# Patient Record
Sex: MALE | Race: WHITE | NOT HISPANIC OR LATINO | Employment: OTHER | URBAN - METROPOLITAN AREA
[De-identification: names, ages, dates, MRNs, and addresses within clinical notes are randomized per-mention and may not be internally consistent; named-entity substitution may affect disease eponyms.]

---

## 2017-01-13 RX ORDER — LISINOPRIL 10 MG/1
TABLET ORAL
Qty: 90 TABLET | Refills: 0 | Status: SHIPPED | OUTPATIENT
Start: 2017-01-13 | End: 2018-03-12

## 2017-02-24 ENCOUNTER — OFFICE VISIT (OUTPATIENT)
Dept: FAMILY MEDICINE CLINIC | Facility: CLINIC | Age: 62
End: 2017-02-24

## 2017-02-24 VITALS
TEMPERATURE: 98.7 F | DIASTOLIC BLOOD PRESSURE: 78 MMHG | HEIGHT: 72 IN | OXYGEN SATURATION: 96 % | WEIGHT: 229 LBS | BODY MASS INDEX: 31.02 KG/M2 | HEART RATE: 91 BPM | SYSTOLIC BLOOD PRESSURE: 134 MMHG

## 2017-02-24 DIAGNOSIS — R07.2 PRECORDIAL PAIN: Primary | ICD-10-CM

## 2017-02-24 DIAGNOSIS — R07.89 CHEST PAIN, ATYPICAL: ICD-10-CM

## 2017-02-24 PROCEDURE — 99213 OFFICE O/P EST LOW 20 MIN: CPT | Performed by: INTERNAL MEDICINE

## 2017-02-24 PROCEDURE — 93000 ELECTROCARDIOGRAM COMPLETE: CPT | Performed by: INTERNAL MEDICINE

## 2017-02-24 NOTE — PROGRESS NOTES
Subjective   Terrance Gresham is a 61 y.o. male. Patient is here today for   Chief Complaint   Patient presents with   • Arm Pain     Right arm pain    • Medication Problem     Discuss androgel           Vitals:    02/24/17 1312   BP: 134/78   Pulse: 91   Temp: 98.7 °F (37.1 °C)   SpO2: 96%       Past Medical History   Diagnosis Date   • Hyperlipidemia    • Hypertension       No Known Allergies   Social History     Social History   • Marital status:      Spouse name: N/A   • Number of children: N/A   • Years of education: N/A     Occupational History   • Not on file.     Social History Main Topics   • Smoking status: Never Smoker   • Smokeless tobacco: Not on file   • Alcohol use Yes   • Drug use: Not on file   • Sexual activity: Not on file     Other Topics Concern   • Not on file     Social History Narrative   • No narrative on file        Current Outpatient Prescriptions:   •  aspirin 81 MG tablet, Take  by mouth., Disp: , Rfl:   •  Cholecalciferol (VITAMIN D3) 2000 UNITS capsule, Take  by mouth., Disp: , Rfl:   •  lansoprazole (PREVACID) 30 MG capsule, TAKE 1 CAPSULE BY MOUTH DAILY, Disp: 90 capsule, Rfl: 0  •  lisinopril (PRINIVIL,ZESTRIL) 10 MG tablet, TAKE 1 TABLET BY MOUTH DAILY, Disp: 90 tablet, Rfl: 0  •  Omega-3 Fatty Acids (FISH OIL) 1200 MG capsule capsule, Take  by mouth., Disp: , Rfl:   •  simvastatin (ZOCOR) 40 MG tablet, TAKE 1 TABLET BY MOUTH EVERY NIGHT AT BEDTIME, Disp: 90 tablet, Rfl: 3  •  Testosterone (ANDROGEL) 40.5 MG/2.5GM (1.62%) gel, APPLY 1 PACKET TO SKIN DAILY AS DIRECTED, Disp: 225 g, Rfl: 1  •  tadalafil (CIALIS) 20 MG tablet, Take 20 mg by mouth Daily As Needed for erectile dysfunction., Disp: , Rfl:      Objective     HPI Comments: He has been having some precordial chest pain.  He tells me that it generally happens to him while he did rest.  He might feel a little nauseated when it occurs, it lasts generally only for a few seconds at a time.  There is no radiation of this  pain into the neck or the arms, there is no associated dyspnea.  There doesn't seem to be in association with physical activity.  It doesn't wake him up at night.    Arm Pain    Associated symptoms include chest pain.        Review of Systems   Constitutional: Negative.    HENT: Negative.    Respiratory: Negative.    Cardiovascular: Positive for chest pain.   Gastrointestinal: Negative.    Musculoskeletal: Negative.    Psychiatric/Behavioral: Negative.        Physical Exam   Constitutional: He is oriented to person, place, and time. He appears well-developed and well-nourished.   HENT:   Head: Normocephalic and atraumatic.   Cardiovascular: Normal rate, regular rhythm and normal heart sounds.  Exam reveals no gallop and no friction rub.    Pulmonary/Chest: Effort normal and breath sounds normal.   Neurological: He is alert and oriented to person, place, and time.   Psychiatric: He has a normal mood and affect. His behavior is normal.         Problem List Items Addressed This Visit        Nervous and Auditory    Chest pain, atypical      Other Visit Diagnoses     Precordial pain    -  Primary    Relevant Orders    Ambulatory Referral to Cardiology    ECG 12 Lead (Completed)            PLAN  His electrocardiogram showed normal sinus rhythm.  There appeared to be no changes when compared to electrocardiogram from November.  He had a left axis deviation.  There may be old evidence for an inferior infarct.        I asked him to stop taking testosterone.  I asked him to make sure he is taking daily aspirin.  I asked him to follow-up with cardiology.  I referred him to Dr. Keegan Reid.      No Follow-up on file.

## 2017-03-01 ENCOUNTER — OFFICE VISIT (OUTPATIENT)
Dept: CARDIOLOGY | Facility: CLINIC | Age: 62
End: 2017-03-01

## 2017-03-01 VITALS
DIASTOLIC BLOOD PRESSURE: 83 MMHG | WEIGHT: 230 LBS | SYSTOLIC BLOOD PRESSURE: 133 MMHG | BODY MASS INDEX: 31.19 KG/M2 | HEART RATE: 77 BPM

## 2017-03-01 DIAGNOSIS — R09.89 CAROTID BRUIT, UNSPECIFIED LATERALITY: ICD-10-CM

## 2017-03-01 DIAGNOSIS — E78.00 HYPERCHOLESTEROLEMIA: ICD-10-CM

## 2017-03-01 DIAGNOSIS — I10 ESSENTIAL HYPERTENSION: Primary | ICD-10-CM

## 2017-03-01 DIAGNOSIS — E66.9 ADIPOSITY: ICD-10-CM

## 2017-03-01 DIAGNOSIS — R07.89 CHEST PAIN, ATYPICAL: ICD-10-CM

## 2017-03-01 DIAGNOSIS — M54.6 ACUTE BILATERAL THORACIC BACK PAIN: ICD-10-CM

## 2017-03-01 DIAGNOSIS — K21.9 GASTROESOPHAGEAL REFLUX DISEASE, ESOPHAGITIS PRESENCE NOT SPECIFIED: ICD-10-CM

## 2017-03-01 PROCEDURE — 99204 OFFICE O/P NEW MOD 45 MIN: CPT | Performed by: INTERNAL MEDICINE

## 2017-03-01 PROCEDURE — 93000 ELECTROCARDIOGRAM COMPLETE: CPT | Performed by: INTERNAL MEDICINE

## 2017-03-01 RX ORDER — UBIDECARENONE 75 MG
500 CAPSULE ORAL DAILY
COMMUNITY
End: 2018-10-01
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
## 2017-03-02 ENCOUNTER — HOSPITAL ENCOUNTER (OUTPATIENT)
Dept: CARDIOLOGY | Facility: HOSPITAL | Age: 62
Discharge: HOME | End: 2017-03-02
Attending: INTERNAL MEDICINE | Admitting: INTERNAL MEDICINE

## 2017-03-02 VITALS
BODY MASS INDEX: 31.15 KG/M2 | SYSTOLIC BLOOD PRESSURE: 133 MMHG | DIASTOLIC BLOOD PRESSURE: 83 MMHG | HEIGHT: 72 IN | WEIGHT: 230 LBS

## 2017-03-02 PROCEDURE — 93306 TTE W/DOPPLER COMPLETE: CPT | Performed by: INTERNAL MEDICINE

## 2017-03-02 PROCEDURE — 0399T HC MYOCARDL STRAIN IMAG QUAN ASSMT PER SESS: CPT

## 2017-03-02 PROCEDURE — 93306 TTE W/DOPPLER COMPLETE: CPT

## 2017-03-03 LAB
BH CV ECHO MEAS - ACS: 1.9 CM
BH CV ECHO MEAS - AO MAX PG (FULL): 1.4 MMHG
BH CV ECHO MEAS - AO MAX PG: 8.1 MMHG
BH CV ECHO MEAS - AO MEAN PG (FULL): 1 MMHG
BH CV ECHO MEAS - AO MEAN PG: 5 MMHG
BH CV ECHO MEAS - AO ROOT AREA (BSA CORRECTED): 1.5
BH CV ECHO MEAS - AO ROOT AREA: 8.6 CM^2
BH CV ECHO MEAS - AO ROOT DIAM: 3.3 CM
BH CV ECHO MEAS - AO V2 MAX: 142 CM/SEC
BH CV ECHO MEAS - AO V2 MEAN: 103 CM/SEC
BH CV ECHO MEAS - AO V2 VTI: 27.1 CM
BH CV ECHO MEAS - AVA(I,A): 4.2 CM^2
BH CV ECHO MEAS - AVA(I,D): 4.2 CM^2
BH CV ECHO MEAS - AVA(V,A): 4.1 CM^2
BH CV ECHO MEAS - AVA(V,D): 4.1 CM^2
BH CV ECHO MEAS - BSA(HAYCOCK): 2.3 M^2
BH CV ECHO MEAS - BSA: 2.3 M^2
BH CV ECHO MEAS - BZI_BMI: 31.2 KILOGRAMS/M^2
BH CV ECHO MEAS - BZI_METRIC_HEIGHT: 182.9 CM
BH CV ECHO MEAS - BZI_METRIC_WEIGHT: 104.3 KG
BH CV ECHO MEAS - CONTRAST EF 4CH: 59.6 ML/M^2
BH CV ECHO MEAS - EDV(CUBED): 59.3 ML
BH CV ECHO MEAS - EDV(MOD-SP4): 94 ML
BH CV ECHO MEAS - EDV(TEICH): 65.9 ML
BH CV ECHO MEAS - EF(CUBED): 73.7 %
BH CV ECHO MEAS - EF(MOD-SP4): 59.6 %
BH CV ECHO MEAS - EF(TEICH): 66.1 %
BH CV ECHO MEAS - ESV(CUBED): 15.6 ML
BH CV ECHO MEAS - ESV(MOD-SP4): 38 ML
BH CV ECHO MEAS - ESV(TEICH): 22.3 ML
BH CV ECHO MEAS - FS: 35.9 %
BH CV ECHO MEAS - IVS/LVPW: 1
BH CV ECHO MEAS - IVSD: 1.1 CM
BH CV ECHO MEAS - LA DIMENSION: 4.6 CM
BH CV ECHO MEAS - LA/AO: 1.4
BH CV ECHO MEAS - LAT PEAK E' VEL: 8.8 CM/SEC
BH CV ECHO MEAS - LV DIASTOLIC VOL/BSA (35-75): 41.6 ML/M^2
BH CV ECHO MEAS - LV MASS(C)D: 140.1 GRAMS
BH CV ECHO MEAS - LV MASS(C)DI: 62 GRAMS/M^2
BH CV ECHO MEAS - LV MAX PG: 6.7 MMHG
BH CV ECHO MEAS - LV MEAN PG: 4 MMHG
BH CV ECHO MEAS - LV SYSTOLIC VOL/BSA (12-30): 16.8 ML/M^2
BH CV ECHO MEAS - LV V1 MAX: 129 CM/SEC
BH CV ECHO MEAS - LV V1 MEAN: 86.7 CM/SEC
BH CV ECHO MEAS - LV V1 VTI: 25.2 CM
BH CV ECHO MEAS - LVIDD: 3.9 CM
BH CV ECHO MEAS - LVIDS: 2.5 CM
BH CV ECHO MEAS - LVLD AP4: 8.6 CM
BH CV ECHO MEAS - LVLS AP4: 6.3 CM
BH CV ECHO MEAS - LVOT AREA (M): 4.5 CM^2
BH CV ECHO MEAS - LVOT AREA: 4.5 CM^2
BH CV ECHO MEAS - LVOT DIAM: 2.4 CM
BH CV ECHO MEAS - LVPWD: 1.1 CM
BH CV ECHO MEAS - MED PEAK E' VEL: 6.9 CM/SEC
BH CV ECHO MEAS - MV A DUR: 0.19 SEC
BH CV ECHO MEAS - MV A MAX VEL: 102 CM/SEC
BH CV ECHO MEAS - MV DEC SLOPE: 277 CM/SEC^2
BH CV ECHO MEAS - MV DEC TIME: 0.17 SEC
BH CV ECHO MEAS - MV E MAX VEL: 75.7 CM/SEC
BH CV ECHO MEAS - MV E/A: 0.74
BH CV ECHO MEAS - MV MAX PG: 3.9 MMHG
BH CV ECHO MEAS - MV MEAN PG: 2 MMHG
BH CV ECHO MEAS - MV P1/2T MAX VEL: 75.7 CM/SEC
BH CV ECHO MEAS - MV P1/2T: 80 MSEC
BH CV ECHO MEAS - MV V2 MAX: 98.3 CM/SEC
BH CV ECHO MEAS - MV V2 MEAN: 67.2 CM/SEC
BH CV ECHO MEAS - MV V2 VTI: 21 CM
BH CV ECHO MEAS - MVA P1/2T LCG: 2.9 CM^2
BH CV ECHO MEAS - MVA(P1/2T): 2.7 CM^2
BH CV ECHO MEAS - MVA(VTI): 5.4 CM^2
BH CV ECHO MEAS - PA MAX PG (FULL): 1.9 MMHG
BH CV ECHO MEAS - PA MAX PG: 3.1 MMHG
BH CV ECHO MEAS - PA V2 MAX: 88.5 CM/SEC
BH CV ECHO MEAS - PULM A REVS DUR: 0.12 SEC
BH CV ECHO MEAS - PULM A REVS VEL: 30.3 CM/SEC
BH CV ECHO MEAS - PULM DIAS VEL: 39 CM/SEC
BH CV ECHO MEAS - PULM S/D: 1.6
BH CV ECHO MEAS - PULM SYS VEL: 62 CM/SEC
BH CV ECHO MEAS - PVA(V,A): 2.8 CM^2
BH CV ECHO MEAS - PVA(V,D): 2.8 CM^2
BH CV ECHO MEAS - QP/QS: 0.44
BH CV ECHO MEAS - RAP SYSTOLE: 10 MMHG
BH CV ECHO MEAS - RV MAX PG: 1.2 MMHG
BH CV ECHO MEAS - RV MEAN PG: 1 MMHG
BH CV ECHO MEAS - RV V1 MAX: 54.7 CM/SEC
BH CV ECHO MEAS - RV V1 MEAN: 42.9 CM/SEC
BH CV ECHO MEAS - RV V1 VTI: 11 CM
BH CV ECHO MEAS - RVDD: 2.5 CM
BH CV ECHO MEAS - RVOT AREA: 4.5 CM^2
BH CV ECHO MEAS - RVOT DIAM: 2.4 CM
BH CV ECHO MEAS - RVSP: 36.2 MMHG
BH CV ECHO MEAS - SI(AO): 102.5 ML/M^2
BH CV ECHO MEAS - SI(CUBED): 19.3 ML/M^2
BH CV ECHO MEAS - SI(LVOT): 50.4 ML/M^2
BH CV ECHO MEAS - SI(MOD-SP4): 24.8 ML/M^2
BH CV ECHO MEAS - SI(TEICH): 19.3 ML/M^2
BH CV ECHO MEAS - SV(AO): 231.8 ML
BH CV ECHO MEAS - SV(CUBED): 43.7 ML
BH CV ECHO MEAS - SV(LVOT): 114 ML
BH CV ECHO MEAS - SV(MOD-SP4): 56 ML
BH CV ECHO MEAS - SV(RVOT): 49.8 ML
BH CV ECHO MEAS - SV(TEICH): 43.6 ML
BH CV ECHO MEAS - TAPSE (>1.6): 2.1 CM2
BH CV ECHO MEAS - TR MAX VEL: 256 CM/SEC
BH CV XLRA - RV BASE: 3.3 CM
BH CV XLRA - RV LENGTH: 7.2 CM
BH CV XLRA - RV MID: 2.8 CM
BH CV XLRA - TDI S': 12.8 CM/SEC
LEFT ATRIUM VOLUME INDEX: 34.7 ML/M2
LV EF 2D ECHO EST: 60 %

## 2017-03-05 ENCOUNTER — TELEPHONE (OUTPATIENT)
Dept: CARDIOLOGY | Facility: HOSPITAL | Age: 62
End: 2017-03-05

## 2017-03-05 DIAGNOSIS — I51.7 RIGHT ATRIAL DILATION: ICD-10-CM

## 2017-03-05 DIAGNOSIS — R06.83 SNORING: Primary | ICD-10-CM

## 2017-03-05 DIAGNOSIS — I51.9 LEFT VENTRICULAR DIASTOLIC DYSFUNCTION: ICD-10-CM

## 2017-03-05 DIAGNOSIS — I51.7 LEFT VENTRICULAR HYPERTROPHY: ICD-10-CM

## 2017-03-06 NOTE — TELEPHONE ENCOUNTER
Echo NL EF  LVH  DD    Mild RV dilation.  If snores, then needs sleep study.    Await cardiolite.           3/2/2017 Routine           Result Text             · Left ventricular wall thickness is consistent with mild concentric hypertrophy.  · Right ventricular cavity is mildly dilated.  · Left atrial cavity size is mild-to-moderately dilated.  · Left ventricular function is normal. Estimated EF = 60%.  · Left ventricular diastolic dysfunction (grade I) consistent with impaired relaxation.

## 2017-03-08 ENCOUNTER — HOSPITAL ENCOUNTER (OUTPATIENT)
Dept: CARDIOLOGY | Facility: HOSPITAL | Age: 62
Discharge: HOME | End: 2017-03-08
Attending: INTERNAL MEDICINE

## 2017-03-08 ENCOUNTER — HOSPITAL ENCOUNTER (OUTPATIENT)
Dept: CARDIOLOGY | Facility: HOSPITAL | Age: 62
Discharge: HOME | End: 2017-03-08
Attending: INTERNAL MEDICINE | Admitting: INTERNAL MEDICINE

## 2017-03-08 VITALS — SYSTOLIC BLOOD PRESSURE: 123 MMHG | DIASTOLIC BLOOD PRESSURE: 88 MMHG | HEART RATE: 73 BPM

## 2017-03-08 LAB
BH CV XLRA MEAS LEFT DIST CCA EDV: -30.6 CM/SEC
BH CV XLRA MEAS LEFT DIST CCA PSV: -118 CM/SEC
BH CV XLRA MEAS LEFT DIST ICA EDV: -25.9 CM/SEC
BH CV XLRA MEAS LEFT DIST ICA PSV: -68 CM/SEC
BH CV XLRA MEAS LEFT MID ICA EDV: -27.1 CM/SEC
BH CV XLRA MEAS LEFT MID ICA PSV: -64.1 CM/SEC
BH CV XLRA MEAS LEFT PROX CCA EDV: 22.8 CM/SEC
BH CV XLRA MEAS LEFT PROX CCA PSV: 111 CM/SEC
BH CV XLRA MEAS LEFT PROX ECA EDV: -21.1 CM/SEC
BH CV XLRA MEAS LEFT PROX ECA PSV: -80.9 CM/SEC
BH CV XLRA MEAS LEFT PROX ICA EDV: 17 CM/SEC
BH CV XLRA MEAS LEFT PROX ICA PSV: 68.6 CM/SEC
BH CV XLRA MEAS LEFT PROX SCLA PSV: 103 CM/SEC
BH CV XLRA MEAS LEFT VERTEBRAL A EDV: -13.7 CM/SEC
BH CV XLRA MEAS LEFT VERTEBRAL A PSV: -43.2 CM/SEC
BH CV XLRA MEAS RIGHT DIST CCA EDV: 29.9 CM/SEC
BH CV XLRA MEAS RIGHT DIST CCA PSV: 111 CM/SEC
BH CV XLRA MEAS RIGHT DIST ICA EDV: -25.1 CM/SEC
BH CV XLRA MEAS RIGHT DIST ICA PSV: -60.1 CM/SEC
BH CV XLRA MEAS RIGHT MID ICA EDV: -31.1 CM/SEC
BH CV XLRA MEAS RIGHT MID ICA PSV: -79.7 CM/SEC
BH CV XLRA MEAS RIGHT PROX CCA EDV: 19.3 CM/SEC
BH CV XLRA MEAS RIGHT PROX CCA PSV: 89.1 CM/SEC
BH CV XLRA MEAS RIGHT PROX ECA EDV: -20.5 CM/SEC
BH CV XLRA MEAS RIGHT PROX ECA PSV: -103 CM/SEC
BH CV XLRA MEAS RIGHT PROX ICA EDV: -21.1 CM/SEC
BH CV XLRA MEAS RIGHT PROX ICA PSV: -78 CM/SEC
BH CV XLRA MEAS RIGHT PROX SCLA PSV: 126 CM/SEC
BH CV XLRA MEAS RIGHT VERTEBRAL A EDV: 9.8 CM/SEC
BH CV XLRA MEAS RIGHT VERTEBRAL A PSV: 34.3 CM/SEC
LEFT ARM BP: NORMAL MMHG
RIGHT ARM BP: NORMAL MMHG

## 2017-03-08 PROCEDURE — A9500 TC99M SESTAMIBI: HCPCS | Performed by: INTERNAL MEDICINE

## 2017-03-08 PROCEDURE — 93017 CV STRESS TEST TRACING ONLY: CPT

## 2017-03-08 PROCEDURE — 0 TECHNETIUM SESTAMIBI: Performed by: INTERNAL MEDICINE

## 2017-03-08 PROCEDURE — 93016 CV STRESS TEST SUPVJ ONLY: CPT | Performed by: INTERNAL MEDICINE

## 2017-03-08 PROCEDURE — 93018 CV STRESS TEST I&R ONLY: CPT | Performed by: INTERNAL MEDICINE

## 2017-03-08 PROCEDURE — 93880 EXTRACRANIAL BILAT STUDY: CPT

## 2017-03-08 PROCEDURE — 78452 HT MUSCLE IMAGE SPECT MULT: CPT

## 2017-03-08 PROCEDURE — 78452 HT MUSCLE IMAGE SPECT MULT: CPT | Performed by: INTERNAL MEDICINE

## 2017-03-08 RX ADMIN — Medication 1 DOSE: at 11:46

## 2017-03-08 RX ADMIN — Medication 1 DOSE: at 08:22

## 2017-03-09 LAB
BH CV STRESS BP STAGE 1: NORMAL
BH CV STRESS BP STAGE 2: NORMAL
BH CV STRESS DURATION MIN STAGE 1: 3
BH CV STRESS DURATION MIN STAGE 2: 3
BH CV STRESS DURATION SEC STAGE 1: 0
BH CV STRESS DURATION SEC STAGE 2: 30
BH CV STRESS GRADE STAGE 1: 10
BH CV STRESS GRADE STAGE 2: 12
BH CV STRESS HR STAGE 1: 123
BH CV STRESS HR STAGE 2: 159
BH CV STRESS METS STAGE 1: 4.6
BH CV STRESS METS STAGE 2: 7.1
BH CV STRESS PROTOCOL 1: NORMAL
BH CV STRESS RECOVERY BP: NORMAL MMHG
BH CV STRESS RECOVERY HR: 92 BPM
BH CV STRESS SPEED STAGE 1: 1.7
BH CV STRESS SPEED STAGE 2: 2.5
BH CV STRESS STAGE 1: 1
BH CV STRESS STAGE 2: 2
LV EF NUC BP: 64 %
MAXIMAL PREDICTED HEART RATE: 157 BPM
PERCENT MAX PREDICTED HR: 101.27 %
STRESS BASELINE BP: NORMAL MMHG
STRESS BASELINE HR: 75 BPM
STRESS PERCENT HR: 119 %
STRESS POST ESTIMATED WORKLOAD: 7.1 METS
STRESS POST EXERCISE DUR MIN: 6 MIN
STRESS POST EXERCISE DUR SEC: 30 SEC
STRESS POST PEAK BP: NORMAL MMHG
STRESS POST PEAK HR: 159 BPM
STRESS TARGET HR: 133 BPM

## 2017-03-10 ENCOUNTER — TELEPHONE (OUTPATIENT)
Dept: CARDIOLOGY | Facility: HOSPITAL | Age: 62
End: 2017-03-10

## 2017-03-10 NOTE — TELEPHONE ENCOUNTER
Reviewed echo, carotid and cardiolite results with patient.  He is agreeable to sleep study as he did report significant snoring, although it has improved as he has lost weight.  I let him know one of our schedulers will reach out to him to get the sleep study scheduled.  aw

## 2017-05-01 RX ORDER — SIMVASTATIN 40 MG
TABLET ORAL
Qty: 90 TABLET | Refills: 0 | Status: SHIPPED | OUTPATIENT
Start: 2017-05-01 | End: 2017-07-27 | Stop reason: SDUPTHER

## 2017-05-02 DIAGNOSIS — E29.1 HYPOGONADISM IN MALE: ICD-10-CM

## 2017-05-02 DIAGNOSIS — Z12.5 SCREENING FOR PROSTATE CANCER: ICD-10-CM

## 2017-05-02 DIAGNOSIS — E78.00 HYPERCHOLESTEROLEMIA: ICD-10-CM

## 2017-05-02 DIAGNOSIS — I10 ESSENTIAL HYPERTENSION: Primary | ICD-10-CM

## 2017-05-09 LAB
ALBUMIN SERPL-MCNC: 4.4 G/DL (ref 3.5–5.2)
ALBUMIN/GLOB SERPL: 2.2 G/DL
ALP SERPL-CCNC: 48 U/L (ref 39–117)
ALT SERPL-CCNC: 13 U/L (ref 1–41)
AST SERPL-CCNC: 16 U/L (ref 1–40)
BILIRUB SERPL-MCNC: 1.5 MG/DL (ref 0.1–1.2)
BUN SERPL-MCNC: 25 MG/DL (ref 8–23)
BUN/CREAT SERPL: 25.5 (ref 7–25)
CALCIUM SERPL-MCNC: 9.2 MG/DL (ref 8.6–10.5)
CHLORIDE SERPL-SCNC: 103 MMOL/L (ref 98–107)
CHOLEST SERPL-MCNC: 159 MG/DL (ref 0–200)
CO2 SERPL-SCNC: 24.3 MMOL/L (ref 22–29)
CONV COMMENT: ABNORMAL
CREAT SERPL-MCNC: 0.98 MG/DL (ref 0.76–1.27)
GLOBULIN SER CALC-MCNC: 2 GM/DL
GLUCOSE SERPL-MCNC: 92 MG/DL (ref 65–99)
HDLC SERPL-MCNC: 55 MG/DL (ref 40–60)
LDLC SERPL CALC-MCNC: 88 MG/DL (ref 0–100)
LDLC/HDLC SERPL: 1.6 {RATIO}
POTASSIUM SERPL-SCNC: 4.2 MMOL/L (ref 3.5–5.2)
PROT SERPL-MCNC: 6.4 G/DL (ref 6–8.5)
PSA SERPL-MCNC: 0.68 NG/ML (ref 0–4)
SODIUM SERPL-SCNC: 143 MMOL/L (ref 136–145)
TESTOST FREE SERPL-MCNC: 4.9 PG/ML (ref 6.6–18.1)
TESTOST SERPL-MCNC: 571 NG/DL (ref 348–1197)
TRIGL SERPL-MCNC: 79 MG/DL (ref 0–150)
VLDLC SERPL CALC-MCNC: 15.8 MG/DL (ref 5–40)

## 2017-05-15 ENCOUNTER — OFFICE VISIT (OUTPATIENT)
Dept: FAMILY MEDICINE CLINIC | Facility: CLINIC | Age: 62
End: 2017-05-15

## 2017-05-15 VITALS
TEMPERATURE: 98 F | SYSTOLIC BLOOD PRESSURE: 134 MMHG | BODY MASS INDEX: 28.17 KG/M2 | WEIGHT: 208 LBS | OXYGEN SATURATION: 95 % | HEIGHT: 72 IN | DIASTOLIC BLOOD PRESSURE: 80 MMHG | RESPIRATION RATE: 16 BRPM | HEART RATE: 70 BPM

## 2017-05-15 DIAGNOSIS — I10 ESSENTIAL HYPERTENSION: Primary | ICD-10-CM

## 2017-05-15 DIAGNOSIS — E78.00 HYPERCHOLESTEROLEMIA: ICD-10-CM

## 2017-05-15 PROCEDURE — 99213 OFFICE O/P EST LOW 20 MIN: CPT | Performed by: INTERNAL MEDICINE

## 2017-05-17 ENCOUNTER — HOSPITAL ENCOUNTER (OUTPATIENT)
Dept: SLEEP MEDICINE | Facility: HOSPITAL | Age: 62
Discharge: HOME | End: 2017-05-17
Attending: INTERNAL MEDICINE | Admitting: INTERNAL MEDICINE

## 2017-05-17 DIAGNOSIS — R06.83 SNORING: ICD-10-CM

## 2017-05-17 DIAGNOSIS — I51.9 LEFT VENTRICULAR DIASTOLIC DYSFUNCTION: ICD-10-CM

## 2017-05-17 DIAGNOSIS — I27.20 PULMONARY HTN (HCC): Primary | ICD-10-CM

## 2017-05-17 DIAGNOSIS — I51.7 RIGHT ATRIAL DILATION: ICD-10-CM

## 2017-05-17 DIAGNOSIS — I51.7 LEFT VENTRICULAR HYPERTROPHY: ICD-10-CM

## 2017-05-17 PROCEDURE — G0463 HOSPITAL OUTPT CLINIC VISIT: HCPCS

## 2017-07-07 ENCOUNTER — HOSPITAL ENCOUNTER (OUTPATIENT)
Dept: SLEEP MEDICINE | Facility: HOSPITAL | Age: 62
Discharge: HOME | End: 2017-07-07
Admitting: INTERNAL MEDICINE

## 2017-07-07 DIAGNOSIS — I27.20 PULMONARY HTN (HCC): ICD-10-CM

## 2017-07-07 PROCEDURE — G0398 HOME SLEEP TEST/TYPE 2 PORTA: HCPCS

## 2017-07-19 ENCOUNTER — TELEPHONE (OUTPATIENT)
Dept: SLEEP MEDICINE | Facility: HOSPITAL | Age: 62
End: 2017-07-19

## 2017-07-27 RX ORDER — SIMVASTATIN 40 MG
TABLET ORAL
Qty: 90 TABLET | Refills: 0 | Status: SHIPPED | OUTPATIENT
Start: 2017-07-27 | End: 2017-10-22 | Stop reason: SDUPTHER

## 2017-08-04 ENCOUNTER — HOSPITAL ENCOUNTER (OUTPATIENT)
Dept: SLEEP MEDICINE | Facility: HOSPITAL | Age: 62
Discharge: HOME | End: 2017-08-04
Admitting: INTERNAL MEDICINE

## 2017-08-04 PROCEDURE — G0463 HOSPITAL OUTPT CLINIC VISIT: HCPCS

## 2017-08-08 NOTE — PROGRESS NOTES
[facility]  Telephone: [phone] / Fax: [phone] [place]  Telephone: [phone] / Fax: [phone] [place]    PCP: Moose Chase MD    Reason for visit:  Sleep disorders: [place]    Terrance Gresham was seen in the Sleep Disorders Center today. 61-year-old male who was previously seen here 5/17/17.  Patient had complaints of snoring and witnessed apneas.  Sleep study done on 7/10/17 showed AHI of 5.1 indicating mild obstructive sleep apnea.  Patient comes in today for review.  His sleep time is 12 midnight awake time is 7 AM.  He continues to have snoring.  His [place] score is 2 and he denies extreme sleepiness during the day.  He is here to review the results of the sleep study.  He denies interval health changes since his last office visit here.    Patient does not smoke.  Drinks 2 alcoholic beverages per week.  Drinks 3 caffeinated coffees a day.  No energy drinks.    Current Medications:    Current Outpatient Prescriptions:   •  aspirin 81 MG tablet, Take  by mouth., Disp: , Rfl:   •  Cholecalciferol (VITAMIN D3) 2000 UNITS capsule, Take  by mouth., Disp: , Rfl:   •  lisinopril (PRINIVIL,ZESTRIL) 10 MG tablet, TAKE 1 TABLET BY MOUTH DAILY, Disp: 90 tablet, Rfl: 0  •  Omega-3 Fatty Acids (FISH OIL) 1200 MG capsule capsule, Take  by mouth., Disp: , Rfl:   •  simvastatin (ZOCOR) 40 MG tablet, TAKE 1 TABLET BY MOUTH EVERY NIGHT AT BEDTIME, Disp: 90 tablet, Rfl: 0  •  tadalafil (CIALIS) 20 MG tablet, Take 20 mg by mouth Daily As Needed for erectile dysfunction., Disp: , Rfl:   •  vitamin B-12 (CYANOCOBALAMIN) 100 MCG tablet, Take 500 mcg by mouth Daily., Disp: , Rfl:    also entered in Sleep Questionnaire    Patient  has a past medical history of Hyperlipidemia and Hypertension.  RV dilatation  I have reviewed the Past Medical History, Past Surgical History, Social History and Family History in EPIC and Sleep Questionnaire.    Pertinent Positive Review of Systems of  "nothing  Rest of Review of Systems was negative as recorded in Sleep Questionnaire.    Vital Signs: Ht. 72\"; Wt. 189lbs;  / 70; HR 74; BMI:25;         General: Alert. Cooperative. Well developed. No acute distress.             Head:  Normocephalic. Symmetrical. Atraumatic.              Nose: No septal deviation. No drainage.          Throat: No oral lesions. No thrush. Moist mucous membranes.    Tongue is normal size with good dentition       Pharynx: Posterior pharyngeal pillars are narrow with Mallampati score of class III     Chest Wall:  Normal shape. Symmetric expansion with respiration. No tenderness.             Neck:  Trachea midline.           Lungs:  Clear to auscultation bilaterally. No wheezes. No rhonchi. No rales. Respirations regular, even and unlabored.            Heart:  Regular rhythm and normal rate. Normal S1 and S2. No murmur.     Abdomen:  Soft, non-tender and non-distended. Normal bowel sounds. No masses.  Extremities:  Moves all extremities well. No edema.    Psychiatric: Normal mood and affect.    Impression:  · Mild obstructive sleep apnea  · RV dilatation on echocardiogram    Plan:  I discussed results of sleep study with the patient.  He has mild obstructive sleep apnea.  Options of oral appliance, CPAP device, inspire device were discussed with the patient.  He prefers to trial the oral appliance first.  I do not feel a follow-up sleep study with oral appliance is necessary given that his AHI was barely abnormal at 5.1.  Oral appliance should also help with his snoring.  If he continues to have issues with snoring or develops daytime sleepiness which is more severe, he was asked to contact us and we can certainly readdress the issue of a CPAP device.  No further schedule follow-up will be made in this office for now.    Thank you for allowing me to participate in your patient's care.    Bello Edgar MD  [facility] PROVIDER SCHEDULE  8/4/2017    Part of this note may be an " electronic transcription/translation of spoken language to printed text using the Dragon Dictation System.

## 2017-10-23 RX ORDER — SIMVASTATIN 40 MG
TABLET ORAL
Qty: 90 TABLET | Refills: 0 | Status: SHIPPED | OUTPATIENT
Start: 2017-10-23 | End: 2018-03-12

## 2017-11-13 DIAGNOSIS — Z00.00 ROUTINE HEALTH MAINTENANCE: Primary | ICD-10-CM

## 2017-11-13 DIAGNOSIS — E29.1 HYPOGONADISM IN MALE: ICD-10-CM

## 2017-11-20 ENCOUNTER — CLINICAL SUPPORT (OUTPATIENT)
Dept: FAMILY MEDICINE CLINIC | Facility: CLINIC | Age: 62
End: 2017-11-20

## 2017-11-20 DIAGNOSIS — Z00.00 ROUTINE HEALTH MAINTENANCE: Primary | ICD-10-CM

## 2017-11-20 PROCEDURE — 85025 COMPLETE CBC W/AUTO DIFF WBC: CPT | Performed by: INTERNAL MEDICINE

## 2017-11-20 PROCEDURE — 93000 ELECTROCARDIOGRAM COMPLETE: CPT | Performed by: INTERNAL MEDICINE

## 2017-11-20 PROCEDURE — 71020 XR CHEST PA AND LATERAL: CPT | Performed by: INTERNAL MEDICINE

## 2017-11-22 LAB
ALBUMIN SERPL-MCNC: 4.4 G/DL (ref 3.5–5.2)
ALBUMIN/GLOB SERPL: 2.1 G/DL
ALP SERPL-CCNC: 52 U/L (ref 39–117)
ALT SERPL-CCNC: 14 U/L (ref 1–41)
APPEARANCE UR: CLEAR
AST SERPL-CCNC: 17 U/L (ref 1–40)
BILIRUB SERPL-MCNC: 1.7 MG/DL (ref 0.1–1.2)
BILIRUB UR QL STRIP: NEGATIVE
BUN SERPL-MCNC: 20 MG/DL (ref 8–23)
BUN/CREAT SERPL: 23.5 (ref 7–25)
CALCIUM SERPL-MCNC: 9.4 MG/DL (ref 8.6–10.5)
CHLORIDE SERPL-SCNC: 102 MMOL/L (ref 98–107)
CHOLEST SERPL-MCNC: 130 MG/DL (ref 0–200)
CO2 SERPL-SCNC: 26.4 MMOL/L (ref 22–29)
COLOR UR: YELLOW
CREAT SERPL-MCNC: 0.85 MG/DL (ref 0.76–1.27)
GFR SERPLBLD CREATININE-BSD FMLA CKD-EPI: 110 ML/MIN/1.73
GFR SERPLBLD CREATININE-BSD FMLA CKD-EPI: 91 ML/MIN/1.73
GLOBULIN SER CALC-MCNC: 2.1 GM/DL
GLUCOSE SERPL-MCNC: 90 MG/DL (ref 65–99)
GLUCOSE UR QL: NEGATIVE
HDLC SERPL-MCNC: 63 MG/DL (ref 40–60)
HGB UR QL STRIP: NEGATIVE
KETONES UR QL STRIP: NEGATIVE
LDLC SERPL CALC-MCNC: 55 MG/DL (ref 0–100)
LDLC/HDLC SERPL: 0.88 {RATIO}
LEUKOCYTE ESTERASE UR QL STRIP: NEGATIVE
NITRITE UR QL STRIP: NEGATIVE
PH UR STRIP: 6 [PH] (ref 5–8)
POTASSIUM SERPL-SCNC: 4 MMOL/L (ref 3.5–5.2)
PROT SERPL-MCNC: 6.5 G/DL (ref 6–8.5)
PROT UR QL STRIP: NEGATIVE
SODIUM SERPL-SCNC: 141 MMOL/L (ref 136–145)
SP GR UR: 1.02 (ref 1–1.03)
TESTOST FREE SERPL-MCNC: 5.4 PG/ML (ref 6.6–18.1)
TESTOST SERPL-MCNC: 499 NG/DL (ref 264–916)
TRIGL SERPL-MCNC: 58 MG/DL (ref 0–150)
UROBILINOGEN UR STRIP-MCNC: NORMAL MG/DL
VLDLC SERPL CALC-MCNC: 11.6 MG/DL (ref 5–40)

## 2017-12-04 ENCOUNTER — OFFICE VISIT (OUTPATIENT)
Dept: FAMILY MEDICINE CLINIC | Facility: CLINIC | Age: 62
End: 2017-12-04

## 2017-12-04 VITALS
HEIGHT: 72 IN | OXYGEN SATURATION: 98 % | WEIGHT: 176 LBS | BODY MASS INDEX: 23.84 KG/M2 | TEMPERATURE: 98 F | RESPIRATION RATE: 16 BRPM | HEART RATE: 70 BPM | DIASTOLIC BLOOD PRESSURE: 64 MMHG | SYSTOLIC BLOOD PRESSURE: 110 MMHG

## 2017-12-04 DIAGNOSIS — Z00.00 WELL ADULT EXAM: ICD-10-CM

## 2017-12-04 DIAGNOSIS — Z12.11 SCREEN FOR COLON CANCER: Primary | ICD-10-CM

## 2017-12-04 DIAGNOSIS — I10 ESSENTIAL HYPERTENSION: ICD-10-CM

## 2017-12-04 DIAGNOSIS — E78.00 HYPERCHOLESTEROLEMIA: ICD-10-CM

## 2017-12-04 LAB — HEMOCCULT STL QL IA: NEGATIVE

## 2017-12-04 PROCEDURE — 82274 ASSAY TEST FOR BLOOD FECAL: CPT | Performed by: INTERNAL MEDICINE

## 2017-12-04 PROCEDURE — 99396 PREV VISIT EST AGE 40-64: CPT | Performed by: INTERNAL MEDICINE

## 2017-12-04 PROCEDURE — 71020 XR CHEST PA AND LATERAL: CPT | Performed by: INTERNAL MEDICINE

## 2018-03-05 ENCOUNTER — TELEPHONE (OUTPATIENT)
Dept: FAMILY MEDICINE CLINIC | Facility: CLINIC | Age: 63
End: 2018-03-05

## 2018-03-05 NOTE — TELEPHONE ENCOUNTER
CALLED PT AND LET PT KNOW HE ONLY NEEDED B/P CHECK. PT STATES WAS WANTING TO KNOW ABOUT HIS CHOLESTEROL AFTER DISCONITUEING CHOLESTEROL MEDICATING. PT WILL JUST HAVE CHOLESTEROL CHECKED WHEN COMES IN OFFICE. PT IS GOING TO BE FASTING AND MADE PT AWARE WE WILL CALL WITH RESULTS IF DR. HENSLEY WANTS HIM TO HAVE CHOLESTEROL LABS. PT UNDERSTOOD.   ----- Message from Demetria Moore MA sent at 3/5/2018  1:03 PM EST -----  Contact: PT  PT WANTS EXPLANATION AS TO WHY THE LABS WERE CANCELLED THAT HE HAD SCHEDULED         PT CAN BE REACHED [phone]

## 2018-03-12 ENCOUNTER — OFFICE VISIT (OUTPATIENT)
Dept: FAMILY MEDICINE CLINIC | Facility: CLINIC | Age: 63
End: 2018-03-12

## 2018-03-12 VITALS
DIASTOLIC BLOOD PRESSURE: 60 MMHG | BODY MASS INDEX: 23.7 KG/M2 | SYSTOLIC BLOOD PRESSURE: 110 MMHG | HEIGHT: 72 IN | HEART RATE: 66 BPM | WEIGHT: 175 LBS | TEMPERATURE: 97.7 F | OXYGEN SATURATION: 99 % | RESPIRATION RATE: 16 BRPM

## 2018-03-12 DIAGNOSIS — I10 ESSENTIAL HYPERTENSION: ICD-10-CM

## 2018-03-12 DIAGNOSIS — Z11.59 NEED FOR HEPATITIS C SCREENING TEST: ICD-10-CM

## 2018-03-12 DIAGNOSIS — E78.00 HYPERCHOLESTEROLEMIA: Primary | ICD-10-CM

## 2018-03-12 PROCEDURE — 99213 OFFICE O/P EST LOW 20 MIN: CPT | Performed by: INTERNAL MEDICINE

## 2018-03-12 NOTE — PROGRESS NOTES
Subjective   Terrance Gresham is a 62 y.o. male. Patient is here today for No chief complaint on file.         Vitals:    03/12/18 0841   BP: 110/60   Pulse: 66   Resp: 16   Temp: 97.7 °F (36.5 °C)   SpO2: 99%       Past Medical History:   Diagnosis Date   • Hyperlipidemia    • Hypertension    • Kidney stone       No Known Allergies   Social History     Social History   • Marital status:      Spouse name: N/A   • Number of children: N/A   • Years of education: N/A     Occupational History   • Not on file.     Social History Main Topics   • Smoking status: Never Smoker   • Smokeless tobacco: Never Used   • Alcohol use Yes   • Drug use: Unknown   • Sexual activity: Not on file     Other Topics Concern   • Not on file     Social History Narrative   • No narrative on file        Current Outpatient Prescriptions:   •  aspirin 81 MG tablet, Take  by mouth., Disp: , Rfl:   •  Cholecalciferol (VITAMIN D3) 2000 UNITS capsule, Take  by mouth., Disp: , Rfl:   •  Omega-3 Fatty Acids (FISH OIL) 1200 MG capsule capsule, Take  by mouth., Disp: , Rfl:   •  vitamin B-12 (CYANOCOBALAMIN) 100 MCG tablet, Take 500 mcg by mouth Daily., Disp: , Rfl:      Objective     He is here to follow-up on hypertension.  He has a history of hypercholesterolemia and is due for labs.    Continues to exercise briskly, aerobically for one hour a day.  He restricts his diet to no more than 2000.    He feels well.         Review of Systems   Constitutional: Negative.    HENT: Negative.    Respiratory: Negative.    Cardiovascular: Negative.    Musculoskeletal: Negative.    Psychiatric/Behavioral: Negative.        Physical Exam   Constitutional: He is oriented to person, place, and time. He appears well-developed and well-nourished.   Pleasant, neatly groomed.  BMI 23.   HENT:   Head: Normocephalic and atraumatic.   Cardiovascular: Normal rate and regular rhythm.    Pulmonary/Chest: Effort normal.   Neurological: He is alert and oriented to person,  place, and time.   Psychiatric: He has a normal mood and affect. His behavior is normal.   Nursing note and vitals reviewed.        Problem List Items Addressed This Visit        Cardiovascular and Mediastinum    Hypercholesterolemia - Primary    Relevant Orders    CBC & Differential    Comprehensive Metabolic Panel    Lipid Panel With LDL / HDL Ratio    Essential hypertension      Other Visit Diagnoses     Need for hepatitis C screening test        Relevant Orders    HCV Antibody Reflex To ALMA            PLAN  He is doing well with his blood pressure.  He has not required blood pressure medication since he is lost about 80 pounds.  His regular aerobic activity helps on this.    His hypercholesterolemia and had been on Zocor 40 mg daily.  He stopped that after he lost regular rate and began exercising regularly 2.  I will check a lipid profile, comprehensive metabolic panel, CBC, urinalysis.  He will hepatitis C.    I asked him to follow-up once yearly for a comprehensive physical exam.  He tells me that that will be due sometime after November 19 this year.  No Follow-up on file.

## 2018-03-13 LAB
ALBUMIN SERPL-MCNC: 4.3 G/DL (ref 3.5–5.2)
ALBUMIN/GLOB SERPL: 2.2 G/DL
ALP SERPL-CCNC: 49 U/L (ref 39–117)
ALT SERPL-CCNC: 15 U/L (ref 1–41)
AST SERPL-CCNC: 15 U/L (ref 1–40)
BASOPHILS # BLD AUTO: 0.04 10*3/MM3 (ref 0–0.2)
BASOPHILS NFR BLD AUTO: 1.1 % (ref 0–1.5)
BILIRUB SERPL-MCNC: 1.6 MG/DL (ref 0.1–1.2)
BUN SERPL-MCNC: 21 MG/DL (ref 8–23)
BUN/CREAT SERPL: 20.4 (ref 7–25)
CALCIUM SERPL-MCNC: 9.7 MG/DL (ref 8.6–10.5)
CHLORIDE SERPL-SCNC: 102 MMOL/L (ref 98–107)
CHOLEST SERPL-MCNC: 205 MG/DL (ref 0–200)
CO2 SERPL-SCNC: 29.4 MMOL/L (ref 22–29)
CREAT SERPL-MCNC: 1.03 MG/DL (ref 0.76–1.27)
EOSINOPHIL # BLD AUTO: 0.02 10*3/MM3 (ref 0–0.7)
EOSINOPHIL NFR BLD AUTO: 0.5 % (ref 0.3–6.2)
ERYTHROCYTE [DISTWIDTH] IN BLOOD BY AUTOMATED COUNT: 12.6 % (ref 11.5–14.5)
GFR SERPLBLD CREATININE-BSD FMLA CKD-EPI: 73 ML/MIN/1.73
GFR SERPLBLD CREATININE-BSD FMLA CKD-EPI: 88 ML/MIN/1.73
GLOBULIN SER CALC-MCNC: 2 GM/DL
GLUCOSE SERPL-MCNC: 91 MG/DL (ref 65–99)
HCT VFR BLD AUTO: 47.3 % (ref 40.4–52.2)
HCV AB S/CO SERPL IA: <0.1 S/CO RATIO (ref 0–0.9)
HCV AB SERPL QL IA: NORMAL
HDLC SERPL-MCNC: 68 MG/DL (ref 40–60)
HGB BLD-MCNC: 15.7 G/DL (ref 13.7–17.6)
IMM GRANULOCYTES # BLD: 0 10*3/MM3 (ref 0–0.03)
IMM GRANULOCYTES NFR BLD: 0 % (ref 0–0.5)
LDLC SERPL CALC-MCNC: 120 MG/DL (ref 0–100)
LDLC/HDLC SERPL: 1.77 {RATIO}
LYMPHOCYTES # BLD AUTO: 1.46 10*3/MM3 (ref 0.9–4.8)
LYMPHOCYTES NFR BLD AUTO: 39.5 % (ref 19.6–45.3)
MCH RBC QN AUTO: 32.9 PG (ref 27–32.7)
MCHC RBC AUTO-ENTMCNC: 33.2 G/DL (ref 32.6–36.4)
MCV RBC AUTO: 99.2 FL (ref 79.8–96.2)
MONOCYTES # BLD AUTO: 0.33 10*3/MM3 (ref 0.2–1.2)
MONOCYTES NFR BLD AUTO: 8.9 % (ref 5–12)
NEUTROPHILS # BLD AUTO: 1.85 10*3/MM3 (ref 1.9–8.1)
NEUTROPHILS NFR BLD AUTO: 50 % (ref 42.7–76)
PLATELET # BLD AUTO: 144 10*3/MM3 (ref 140–500)
POTASSIUM SERPL-SCNC: 4.8 MMOL/L (ref 3.5–5.2)
PROT SERPL-MCNC: 6.3 G/DL (ref 6–8.5)
RBC # BLD AUTO: 4.77 10*6/MM3 (ref 4.6–6)
SODIUM SERPL-SCNC: 142 MMOL/L (ref 136–145)
TRIGL SERPL-MCNC: 84 MG/DL (ref 0–150)
VLDLC SERPL CALC-MCNC: 16.8 MG/DL (ref 5–40)
WBC # BLD AUTO: 3.7 10*3/MM3 (ref 4.5–10.7)

## 2018-08-17 DIAGNOSIS — Z12.5 SCREENING PSA (PROSTATE SPECIFIC ANTIGEN): ICD-10-CM

## 2018-08-17 DIAGNOSIS — Z11.59 NEED FOR HEPATITIS C SCREENING TEST: ICD-10-CM

## 2018-08-17 DIAGNOSIS — E78.00 HYPERCHOLESTEROLEMIA: Primary | ICD-10-CM

## 2018-08-22 ENCOUNTER — RESULTS ENCOUNTER (OUTPATIENT)
Dept: FAMILY MEDICINE CLINIC | Facility: CLINIC | Age: 63
End: 2018-08-22

## 2018-08-22 DIAGNOSIS — E78.00 HYPERCHOLESTEROLEMIA: ICD-10-CM

## 2018-08-22 DIAGNOSIS — Z12.5 SCREENING PSA (PROSTATE SPECIFIC ANTIGEN): ICD-10-CM

## 2018-08-22 DIAGNOSIS — Z11.59 NEED FOR HEPATITIS C SCREENING TEST: ICD-10-CM

## 2018-08-23 LAB
ALBUMIN SERPL-MCNC: 4.7 G/DL (ref 3.5–5.2)
ALBUMIN/GLOB SERPL: 2.8 G/DL
ALP SERPL-CCNC: 62 U/L (ref 39–117)
ALT SERPL-CCNC: 12 U/L (ref 1–41)
AST SERPL-CCNC: 13 U/L (ref 1–40)
BASOPHILS # BLD AUTO: 0.03 10*3/MM3 (ref 0–0.2)
BASOPHILS NFR BLD AUTO: 0.9 % (ref 0–1.5)
BILIRUB SERPL-MCNC: 1.4 MG/DL (ref 0.1–1.2)
BUN SERPL-MCNC: 19 MG/DL (ref 8–23)
BUN/CREAT SERPL: 17.6 (ref 7–25)
CALCIUM SERPL-MCNC: 8.9 MG/DL (ref 8.6–10.5)
CHLORIDE SERPL-SCNC: 103 MMOL/L (ref 98–107)
CHOLEST SERPL-MCNC: 199 MG/DL (ref 0–200)
CO2 SERPL-SCNC: 29.4 MMOL/L (ref 22–29)
CREAT SERPL-MCNC: 1.08 MG/DL (ref 0.76–1.27)
EOSINOPHIL # BLD AUTO: 0.05 10*3/MM3 (ref 0–0.7)
EOSINOPHIL NFR BLD AUTO: 1.5 % (ref 0.3–6.2)
ERYTHROCYTE [DISTWIDTH] IN BLOOD BY AUTOMATED COUNT: 12.6 % (ref 11.5–14.5)
GLOBULIN SER CALC-MCNC: 1.7 GM/DL
GLUCOSE SERPL-MCNC: 89 MG/DL (ref 65–99)
HCT VFR BLD AUTO: 47 % (ref 40.4–52.2)
HCV AB S/CO SERPL IA: <0.1 S/CO RATIO (ref 0–0.9)
HCV AB SERPL QL IA: NORMAL
HDLC SERPL-MCNC: 70 MG/DL (ref 40–60)
HGB BLD-MCNC: 15.6 G/DL (ref 13.7–17.6)
IMM GRANULOCYTES # BLD: 0 10*3/MM3 (ref 0–0.03)
IMM GRANULOCYTES NFR BLD: 0 % (ref 0–0.5)
LDLC SERPL CALC-MCNC: 120 MG/DL (ref 0–100)
LDLC/HDLC SERPL: 1.71 {RATIO}
LYMPHOCYTES # BLD AUTO: 1.27 10*3/MM3 (ref 0.9–4.8)
LYMPHOCYTES NFR BLD AUTO: 38.8 % (ref 19.6–45.3)
MCH RBC QN AUTO: 33 PG (ref 27–32.7)
MCHC RBC AUTO-ENTMCNC: 33.2 G/DL (ref 32.6–36.4)
MCV RBC AUTO: 99.4 FL (ref 79.8–96.2)
MONOCYTES # BLD AUTO: 0.35 10*3/MM3 (ref 0.2–1.2)
MONOCYTES NFR BLD AUTO: 10.7 % (ref 5–12)
NEUTROPHILS # BLD AUTO: 1.57 10*3/MM3 (ref 1.9–8.1)
NEUTROPHILS NFR BLD AUTO: 48.1 % (ref 42.7–76)
PLATELET # BLD AUTO: 147 10*3/MM3 (ref 140–500)
POTASSIUM SERPL-SCNC: 5 MMOL/L (ref 3.5–5.2)
PROT SERPL-MCNC: 6.4 G/DL (ref 6–8.5)
PSA SERPL-MCNC: 1.15 NG/ML (ref 0–4)
RBC # BLD AUTO: 4.73 10*6/MM3 (ref 4.6–6)
SODIUM SERPL-SCNC: 139 MMOL/L (ref 136–145)
TRIGL SERPL-MCNC: 47 MG/DL (ref 0–150)
VLDLC SERPL CALC-MCNC: 9.4 MG/DL (ref 5–40)
WBC # BLD AUTO: 3.27 10*3/MM3 (ref 4.5–10.7)

## 2018-08-29 ENCOUNTER — OFFICE VISIT (OUTPATIENT)
Dept: FAMILY MEDICINE CLINIC | Facility: CLINIC | Age: 63
End: 2018-08-29

## 2018-08-29 VITALS
WEIGHT: 175.2 LBS | SYSTOLIC BLOOD PRESSURE: 110 MMHG | OXYGEN SATURATION: 97 % | HEIGHT: 72 IN | HEART RATE: 59 BPM | RESPIRATION RATE: 16 BRPM | BODY MASS INDEX: 23.73 KG/M2 | TEMPERATURE: 97.7 F | DIASTOLIC BLOOD PRESSURE: 64 MMHG

## 2018-08-29 DIAGNOSIS — D72.819 LEUKOPENIA, UNSPECIFIED TYPE: ICD-10-CM

## 2018-08-29 DIAGNOSIS — I10 ESSENTIAL HYPERTENSION: Primary | ICD-10-CM

## 2018-08-29 PROCEDURE — 99214 OFFICE O/P EST MOD 30 MIN: CPT | Performed by: INTERNAL MEDICINE

## 2018-08-29 NOTE — PROGRESS NOTES
Subjective   Terrance Gresham is a 62 y.o. male. Patient is here today for   Chief Complaint   Patient presents with   • Follow-up     hypercholesterolemia           Vitals:    08/29/18 0916   BP: 110/64   Pulse: 59   Resp: 16   Temp: 97.7 °F (36.5 °C)   SpO2: 97%       Past Medical History:   Diagnosis Date   • Hyperlipidemia    • Hypertension    • Kidney stone       No Known Allergies   Social History     Social History   • Marital status:      Spouse name: N/A   • Number of children: N/A   • Years of education: N/A     Occupational History   • Not on file.     Social History Main Topics   • Smoking status: Never Smoker   • Smokeless tobacco: Never Used   • Alcohol use Yes   • Drug use: Unknown   • Sexual activity: Not on file     Other Topics Concern   • Not on file     Social History Narrative   • No narrative on file        Current Outpatient Prescriptions:   •  aspirin 81 MG tablet, Take  by mouth., Disp: , Rfl:   •  Cholecalciferol (VITAMIN D3) 2000 UNITS capsule, Take  by mouth., Disp: , Rfl:   •  Omega-3 Fatty Acids (FISH OIL) 1200 MG capsule capsule, Take  by mouth., Disp: , Rfl:   •  vitamin B-12 (CYANOCOBALAMIN) 100 MCG tablet, Take 500 mcg by mouth Daily., Disp: , Rfl:      Objective     He is here today to follow-up on hypercholesterolemia.    He is to have elevated blood pressures, but this is resolved with his weight loss following his long term.    He tells me that he feels well.         Review of Systems   Constitutional: Negative.    HENT: Negative.    Respiratory: Negative.    Cardiovascular: Negative.    Musculoskeletal: Negative.    Psychiatric/Behavioral: Negative.        Physical Exam   Constitutional: He is oriented to person, place, and time. He appears well-developed and well-nourished.   HENT:   Head: Normocephalic and atraumatic.   Pulmonary/Chest: Effort normal.   Neurological: He is alert and oriented to person, place, and time.   Psychiatric: He has a normal mood and affect.  His behavior is normal.   Nursing note and vitals reviewed.        Problem List Items Addressed This Visit        Cardiovascular and Mediastinum    Essential hypertension - Primary       Immune and Lymphatic    Leukopenia    Relevant Orders    Ambulatory Referral to Hematology / Oncology            PLAN  His blood pressures look great today.    His LDL cholesterol is 129.  He had a vascular study year and a half ago which showed no trace of plaque in the carotid arteries.  I think his cholesterol looks good where it is.    He has a leukopenia.  His white blood cell count is 3.27.  5 months ago was 3.7.  2 years ago was 5.04.  6.892 in 2015.    Differential revealed a decrease in neutrophils.  I told him that I would like to refer him to hematology to get their opinion.    I asked him to follow-up for a comprehensive physical exam after December 4.    Dr. Maurisio Montgomery did a colonoscopy on him 5 years ago.  He noted that he was due for another colonoscopy about now.  I asked him to call Dr. Montgomery's office and arrange that.  No Follow-up on file.

## 2018-09-06 ENCOUNTER — PREP FOR SURGERY (OUTPATIENT)
Dept: OTHER | Facility: HOSPITAL | Age: 63
End: 2018-09-06

## 2018-09-06 DIAGNOSIS — Z86.010 HISTORY OF COLON POLYPS: Primary | ICD-10-CM

## 2018-09-06 RX ORDER — SODIUM CHLORIDE, SODIUM LACTATE, POTASSIUM CHLORIDE, CALCIUM CHLORIDE 600; 310; 30; 20 MG/100ML; MG/100ML; MG/100ML; MG/100ML
30 INJECTION, SOLUTION INTRAVENOUS CONTINUOUS
Status: CANCELLED | OUTPATIENT
Start: 2018-12-10

## 2018-09-07 PROBLEM — Z86.0100 HISTORY OF COLON POLYPS: Status: ACTIVE | Noted: 2018-09-07

## 2018-09-07 PROBLEM — Z86.010 HISTORY OF COLON POLYPS: Status: ACTIVE | Noted: 2018-09-07

## 2018-10-01 ENCOUNTER — LAB (OUTPATIENT)
Dept: OTHER | Facility: HOSPITAL | Age: 63
End: 2018-10-01

## 2018-10-01 ENCOUNTER — CONSULT (OUTPATIENT)
Dept: ONCOLOGY | Facility: CLINIC | Age: 63
End: 2018-10-01

## 2018-10-01 VITALS
BODY MASS INDEX: 24.95 KG/M2 | HEART RATE: 74 BPM | HEIGHT: 70 IN | WEIGHT: 174.3 LBS | DIASTOLIC BLOOD PRESSURE: 76 MMHG | RESPIRATION RATE: 16 BRPM | SYSTOLIC BLOOD PRESSURE: 135 MMHG | TEMPERATURE: 98.4 F | OXYGEN SATURATION: 99 %

## 2018-10-01 DIAGNOSIS — D72.819 LEUKOPENIA, UNSPECIFIED TYPE: Primary | ICD-10-CM

## 2018-10-01 DIAGNOSIS — R74.8 ABNORMAL LIVER ENZYMES: ICD-10-CM

## 2018-10-01 DIAGNOSIS — D69.6 THROMBOCYTOPENIA (HCC): ICD-10-CM

## 2018-10-01 LAB
BASOPHILS # BLD AUTO: 0.05 10*3/MM3 (ref 0–0.2)
BASOPHILS NFR BLD AUTO: 1 % (ref 0–1.5)
BILIRUB CONJ SERPL-MCNC: 0.2 MG/DL (ref 0–0.3)
DEPRECATED RDW RBC AUTO: 40.8 FL (ref 37–54)
EOSINOPHIL # BLD AUTO: 0.01 10*3/MM3 (ref 0–0.7)
EOSINOPHIL NFR BLD AUTO: 0.2 % (ref 0.3–6.2)
ERYTHROCYTE [DISTWIDTH] IN BLOOD BY AUTOMATED COUNT: 11.9 % (ref 11.5–14.5)
FOLATE SERPL-MCNC: >20 NG/ML (ref 4.78–24.2)
HAPTOGLOB SERPL-MCNC: 63 MG/DL (ref 30–200)
HCT VFR BLD AUTO: 46.8 % (ref 40.4–52.2)
HGB BLD-MCNC: 16.3 G/DL (ref 13.7–17.6)
HGB RETIC QN: 36.5 PG (ref 32.7–38.6)
IMM GRANULOCYTES # BLD: 0.03 10*3/MM3 (ref 0–0.03)
IMM GRANULOCYTES NFR BLD: 0.6 % (ref 0–0.5)
IMM RETICS NFR: 7.7 % (ref 0.7–13.7)
LYMPHOCYTES # BLD AUTO: 1.26 10*3/MM3 (ref 0.9–4.8)
LYMPHOCYTES NFR BLD AUTO: 25.5 % (ref 19.6–45.3)
MCH RBC QN AUTO: 32.5 PG (ref 27–32.7)
MCHC RBC AUTO-ENTMCNC: 34.8 G/DL (ref 32.6–36.4)
MCV RBC AUTO: 93.4 FL (ref 79.8–96.2)
MONOCYTES # BLD AUTO: 0.46 10*3/MM3 (ref 0.2–1.2)
MONOCYTES NFR BLD AUTO: 9.3 % (ref 5–12)
NEUTROPHILS # BLD AUTO: 3.13 10*3/MM3 (ref 1.9–8.1)
NEUTROPHILS NFR BLD AUTO: 63.4 % (ref 42.7–76)
NRBC BLD MANUAL-RTO: 0 /100 WBC (ref 0–0)
PLATELET # BLD AUTO: 130 10*3/MM3 (ref 140–500)
PLATELET # BLD AUTO: 148 10*3/MM3 (ref 140–500)
PLATELETS.RETICULATED NFR BLD AUTO: 3 % (ref 0.9–6.5)
PMV BLD AUTO: 9.9 FL (ref 6–12)
RBC # BLD AUTO: 5.01 10*6/MM3 (ref 4.6–6)
RETICS/RBC NFR AUTO: 1.2 % (ref 0.5–1.5)
VIT B12 BLD-MCNC: 1396 PG/ML (ref 211–946)
WBC NRBC COR # BLD: 4.94 10*3/MM3 (ref 4.5–10.7)

## 2018-10-01 PROCEDURE — 85055 RETICULATED PLATELET ASSAY: CPT | Performed by: INTERNAL MEDICINE

## 2018-10-01 PROCEDURE — 85046 RETICYTE/HGB CONCENTRATE: CPT | Performed by: INTERNAL MEDICINE

## 2018-10-01 PROCEDURE — 36415 COLL VENOUS BLD VENIPUNCTURE: CPT

## 2018-10-01 PROCEDURE — 82746 ASSAY OF FOLIC ACID SERUM: CPT | Performed by: INTERNAL MEDICINE

## 2018-10-01 PROCEDURE — 99204 OFFICE O/P NEW MOD 45 MIN: CPT | Performed by: INTERNAL MEDICINE

## 2018-10-01 PROCEDURE — 82248 BILIRUBIN DIRECT: CPT | Performed by: INTERNAL MEDICINE

## 2018-10-01 PROCEDURE — 82607 VITAMIN B-12: CPT | Performed by: INTERNAL MEDICINE

## 2018-10-01 PROCEDURE — 83921 ORGANIC ACID SINGLE QUANT: CPT | Performed by: INTERNAL MEDICINE

## 2018-10-01 PROCEDURE — 83010 ASSAY OF HAPTOGLOBIN QUANT: CPT | Performed by: INTERNAL MEDICINE

## 2018-10-01 PROCEDURE — 85025 COMPLETE CBC W/AUTO DIFF WBC: CPT | Performed by: INTERNAL MEDICINE

## 2018-10-01 NOTE — PROGRESS NOTES
"Subjective     REASON FOR CONSULTATION:  Provide an opinion on any further workup or treatment on:    Leukopenia                       REQUESTING PHYSICIAN: Moose Chase MD      RECORDS OBTAINED: Records of the patients history including those obtained from the referring provider were reviewed and summarized in detail.    HISTORY OF PRESENT ILLNESS:      Terrance Gresham is a 62 y.o. patient who was referred for evaluation of leukopenia.  He was found on labs on 3/12/2018 to have a WBC count of 3700 with a neutrophil count of 1850.  CBC was repeated on 8/22/2018 and WBC count was down to 3200 and neutrophil count was 1500.  He did not have any infections this year.  He did not start any new medicine.  He has been taking B12 supplement for a few years.    Patient was noted to have some cytopenia with platelet count of 122,000 on 11/20/2017.  Platelets in the 140,000 range with this year but are at 130,000 today.    The patient is not having problems with bleeding or bruising.  He takes aspirin 81 mg daily.  He also takes fish oil regularly.    Patient lost 75 pounds over the past 2 years through dieting and exercising.           Past Medical History:   Diagnosis Date   • Disc disorder     Bulging   • Hyperlipidemia    • Hypertension    • Kidney stone 2007   • Leukopenia        Past Surgical History:   Procedure Laterality Date   • CYST REMOVAL Right 2012    wrist    • KNEE ARTHROSCOPY Right    • KNEE SURGERY Right 01/13/2016       Social History     Social History   • Marital status:      Spouse name: Kimberly   • Number of children: N/A   • Years of education: N/A     Occupational History   •  Retired     Social History Main Topics   • Smoking status: Never Smoker   • Smokeless tobacco: Never Used   • Alcohol use 2.4 oz/week     4 Cans of beer per week      Comment: \"4 beers on weekends\"    • Drug use: No   • Sexual activity: Not on file     Other Topics Concern   • Not on file     Social History Narrative " "  • No narrative on file       Cancer-related family history includes Cancer in his paternal grandfather; Lung cancer in his mother; Prostate cancer in his father; Prostate cancer (age of onset: 69) in his brother; Stomach cancer in his mother.    MEDICATIONS:    Current Outpatient Prescriptions:   •  Cyanocobalamin (B-12 PO), Take 1,000 mcg by mouth Daily., Disp: , Rfl:   •  aspirin 81 MG tablet, Take  by mouth Daily., Disp: , Rfl:   •  Cholecalciferol (VITAMIN D3) 2000 UNITS capsule, Take 2,000 Units by mouth Daily., Disp: , Rfl:   •  Omega-3 Fatty Acids (FISH OIL) 1200 MG capsule capsule, Take 1,200 mg by mouth 2 (Two) Times a Day., Disp: , Rfl:      ALLERGIES:  No Known Allergies     REVIEW OF SYSTEMS:  Review of Systems   Constitutional: Negative for chills, fever and unexpected weight change.   HENT: Negative for mouth sores, nosebleeds, sore throat and voice change.    Eyes: Negative for visual disturbance.   Respiratory: Negative for cough and shortness of breath.    Cardiovascular: Negative for chest pain and leg swelling.   Gastrointestinal: Negative for abdominal pain, blood in stool, constipation, diarrhea, nausea and vomiting.   Genitourinary: Negative for dysuria, frequency and hematuria.   Musculoskeletal: Negative for arthralgias, back pain and joint swelling.   Skin: Positive for rash.   Neurological: Negative for dizziness, numbness and headaches.   Hematological: Negative for adenopathy. Does not bruise/bleed easily.   Psychiatric/Behavioral: Negative for dysphoric mood. The patient is not nervous/anxious.          Objective   VITAL SIGNS:  Vitals:    10/01/18 1238   BP: 135/76   Pulse: 74   Resp: 16   Temp: 98.4 °F (36.9 °C)   TempSrc: Oral   SpO2: 99%   Weight: 79.1 kg (174 lb 4.8 oz)   Height: 179 cm (70.47\")  Comment: new pt   PainSc: 0-No pain       Wt Readings from Last 3 Encounters:   10/01/18 79.1 kg (174 lb 4.8 oz)   08/29/18 79.5 kg (175 lb 3.2 oz)   03/12/18 79.4 kg (175 lb) "       PHYSICAL EXAMINATION  GENERAL:  The patient appears in good general condition, not in acute distress.  SKIN: Warm and dry. No skin rashes, ecchymosis or petechiae.  HEAD:  Normocephalic.  EYES:  No Jaundice. No Pallor. Pupils equal. EOMI.  NECK:  Supple with Good ROM. No Thyromegaly. No Masses.  LYMPHATICS:  No cervical or supraclavicular lymphadenopathy.  CHEST: Normal respiratory effort. Lungs clear to auscultation.   CARDIAC:  Normal S1 & S2. No murmurs.  ABDOMEN:  Soft. No tenderness. No Hepatomegaly. No Splenomegaly. No masses.  EXTREMITIES:  No arthritic changes. No Edema. No Calf tenderness.  NEUROLOGICAL:  No Focal neurological deficits.         RESULT REVIEW:     Results from last 7 days  Lab Units 10/01/18  1219   WBC 10*3/mm3 4.94   NEUTROS ABS 10*3/mm3 3.13   HEMOGLOBIN g/dL 16.3   HEMATOCRIT % 46.8   PLATELETS 10*3/mm3 130*           Lab Results   Component Value Date    [id number] 406 09/23/2015     I reviewed the peripheral blood smear from today.  WBCs were normal.  No premature cells or blasts were identified.  Platelets were normal in size.  RBCs werenormal.  There was no evidence of polychromasia.       Assessment/Plan   1.  Leukopenia with neutropenia.  It was first identified on 3/12/2018 and his counts worsen on 8/22/2018.  WBC count is normal today within normal neutrophil count.  Improvement favors a reactive decrease in the count with subsequent improvement and argues against an underlying bone marrow pathology.    2.  Thrombocytopenia.  On review of labs from 2015, platelets were slightly low at 148,000.  They decreased to 122,000 on 11/20/2017.  The activity at 130,000.  Platelet volume is normal.      The leukopenia and thrombocytopenia are concerning for folate or vitamin B-12 deficiency.  With the patient's chronically elevated bilirubin, the concern his underlying liver disease that resulted in his splenomegaly.      3.  Elevated serum bilirubin.  On review of labs dating back  2015, patient had an elevated bilirubin level and has remained elevated since that time.  This is concerning for underlying liver disease especially with the patient's prior history of heavy alcohol consumption.  The other differential diagnosis is hemolysis versus Gilbert syndrome.    PLAN:    1.  I recommended obtaining a CT scan to evaluate for splenomegaly and underlying liver disease being the cause of thrombocytopenia.    2.  I'll obtain B12, folate, methylmalonic acid, reticulocyte count, IPF, direct bilirubin and haptoglobin levels.    3.  Patient is okay to continue his aspirin and fish oil given the mild degree of thrombocytopenia.    I will see in follow-up in one month with a repeat CBC.        Yaakov Hernandez MD  10/01/18

## 2018-10-05 LAB
Lab: NORMAL
METHYLMALONATE SERPL-SCNC: 131 NMOL/L (ref 0–378)

## 2018-10-15 ENCOUNTER — HOSPITAL ENCOUNTER (OUTPATIENT)
Dept: CT IMAGING | Facility: HOSPITAL | Age: 63
Discharge: HOME | End: 2018-10-15
Attending: INTERNAL MEDICINE | Admitting: INTERNAL MEDICINE

## 2018-10-15 DIAGNOSIS — R74.8 ABNORMAL LIVER ENZYMES: ICD-10-CM

## 2018-10-15 DIAGNOSIS — D69.6 THROMBOCYTOPENIA (HCC): ICD-10-CM

## 2018-10-15 DIAGNOSIS — D72.819 LEUKOPENIA, UNSPECIFIED TYPE: ICD-10-CM

## 2018-10-15 LAB — CREAT BLDA-MCNC: 1.1 MG/DL (ref 0.6–1.3)

## 2018-10-15 PROCEDURE — 0 DIATRIZOATE MEGLUMINE & SODIUM PER 1 ML: Performed by: INTERNAL MEDICINE

## 2018-10-15 PROCEDURE — 82565 ASSAY OF CREATININE: CPT

## 2018-10-15 PROCEDURE — 74177 CT ABD & PELVIS W/CONTRAST: CPT

## 2018-10-15 PROCEDURE — 25010000002 IOPAMIDOL 61 % SOLUTION: Performed by: INTERNAL MEDICINE

## 2018-10-15 RX ADMIN — DIATRIZOATE MEGLUMINE AND DIATRIZOATE SODIUM 30 ML: 660; 100 LIQUID ORAL; RECTAL at 11:13

## 2018-10-15 RX ADMIN — IOPAMIDOL 85 ML: 612 INJECTION, SOLUTION INTRAVENOUS at 11:13

## 2018-10-29 ENCOUNTER — OFFICE VISIT (OUTPATIENT)
Dept: ONCOLOGY | Facility: CLINIC | Age: 63
End: 2018-10-29

## 2018-10-29 ENCOUNTER — LAB (OUTPATIENT)
Dept: OTHER | Facility: HOSPITAL | Age: 63
End: 2018-10-29

## 2018-10-29 VITALS
DIASTOLIC BLOOD PRESSURE: 78 MMHG | TEMPERATURE: 98.2 F | RESPIRATION RATE: 14 BRPM | BODY MASS INDEX: 24.77 KG/M2 | SYSTOLIC BLOOD PRESSURE: 114 MMHG | WEIGHT: 173 LBS | HEIGHT: 70 IN | OXYGEN SATURATION: 97 % | HEART RATE: 72 BPM

## 2018-10-29 DIAGNOSIS — E80.6 HYPERBILIRUBINEMIA: ICD-10-CM

## 2018-10-29 DIAGNOSIS — D69.6 THROMBOCYTOPENIA (HCC): ICD-10-CM

## 2018-10-29 DIAGNOSIS — D72.819 LEUKOPENIA, UNSPECIFIED TYPE: ICD-10-CM

## 2018-10-29 DIAGNOSIS — D70.8 OTHER NEUTROPENIA (HCC): Primary | ICD-10-CM

## 2018-10-29 LAB
BASOPHILS # BLD AUTO: 0.04 10*3/MM3 (ref 0–0.2)
BASOPHILS NFR BLD AUTO: 0.8 % (ref 0–1.5)
DEPRECATED RDW RBC AUTO: 40.9 FL (ref 37–54)
EOSINOPHIL # BLD AUTO: 0.02 10*3/MM3 (ref 0–0.7)
EOSINOPHIL NFR BLD AUTO: 0.4 % (ref 0.3–6.2)
ERYTHROCYTE [DISTWIDTH] IN BLOOD BY AUTOMATED COUNT: 11.9 % (ref 11.5–14.5)
HCT VFR BLD AUTO: 45.5 % (ref 40.4–52.2)
HGB BLD-MCNC: 15.9 G/DL (ref 13.7–17.6)
IMM GRANULOCYTES # BLD: 0.03 10*3/MM3 (ref 0–0.03)
IMM GRANULOCYTES NFR BLD: 0.6 % (ref 0–0.5)
LYMPHOCYTES # BLD AUTO: 0.98 10*3/MM3 (ref 0.9–4.8)
LYMPHOCYTES NFR BLD AUTO: 19.7 % (ref 19.6–45.3)
MCH RBC QN AUTO: 32.4 PG (ref 27–32.7)
MCHC RBC AUTO-ENTMCNC: 34.9 G/DL (ref 32.6–36.4)
MCV RBC AUTO: 92.9 FL (ref 79.8–96.2)
MONOCYTES # BLD AUTO: 0.3 10*3/MM3 (ref 0.2–1.2)
MONOCYTES NFR BLD AUTO: 6 % (ref 5–12)
NEUTROPHILS # BLD AUTO: 3.6 10*3/MM3 (ref 1.9–8.1)
NEUTROPHILS NFR BLD AUTO: 72.5 % (ref 42.7–76)
NRBC BLD MANUAL-RTO: 0 /100 WBC (ref 0–0)
PLATELET # BLD AUTO: 153 10*3/MM3 (ref 140–500)
PMV BLD AUTO: 9.5 FL (ref 6–12)
RBC # BLD AUTO: 4.9 10*6/MM3 (ref 4.6–6)
WBC NRBC COR # BLD: 4.97 10*3/MM3 (ref 4.5–10.7)

## 2018-10-29 PROCEDURE — 85025 COMPLETE CBC W/AUTO DIFF WBC: CPT | Performed by: INTERNAL MEDICINE

## 2018-10-29 PROCEDURE — 99214 OFFICE O/P EST MOD 30 MIN: CPT | Performed by: INTERNAL MEDICINE

## 2018-10-29 PROCEDURE — 36415 COLL VENOUS BLD VENIPUNCTURE: CPT

## 2018-10-29 RX ORDER — HEPATITIS A VACCINE 1440 [IU]/ML
INJECTION, SUSPENSION INTRAMUSCULAR
COMMUNITY
Start: 2018-10-25 | End: 2019-12-18

## 2018-10-29 NOTE — PROGRESS NOTES
"Subjective     CHIEF COMPLAINT:      Chief Complaint   Patient presents with   • Follow-up     discuss ct scan         HISTORY OF PRESENT ILLNESS:     Terrance Gresham is a 63 y.o. male patient who returns today for follow up on his leukopenia and thrombocytopenia. He reports feeling good today. No abdominal pain.  No recent infections.     The patient reports history of kidney stones.       Past Medical History:   Diagnosis Date   • Disc disorder     Bulging   • Hyperlipidemia    • Hypertension    • Kidney stone 2007   • Leukopenia        Past Surgical History:   Procedure Laterality Date   • CYST REMOVAL Right 2012    wrist    • KNEE ARTHROSCOPY Right 2015   • KNEE SURGERY Right 01/13/2016       Cancer-related family history includes Breast cancer in his mother; Cancer in his paternal grandfather; Lung cancer in his mother; Prostate cancer in his father; Prostate cancer (age of onset: 60) in his brother; Stomach cancer in his mother.  Social History   Substance Use Topics   • Smoking status: Never Smoker   • Smokeless tobacco: Never Used   • Alcohol use 2.4 oz/week     4 Cans of beer per week      Comment: \"4 beers on weekends\"        MEDICATIONS:    Current Outpatient Prescriptions:   •  aspirin 81 MG tablet, Take  by mouth Daily., Disp: , Rfl:   •  Cholecalciferol (VITAMIN D3) 2000 UNITS capsule, Take 2,000 Units by mouth Daily., Disp: , Rfl:   •  Cyanocobalamin (B-12 PO), Take 1,000 mcg by mouth Daily., Disp: , Rfl:   •  HAVRIX 1440 EL U/ML vaccine, , Disp: , Rfl:   •  Omega-3 Fatty Acids (FISH OIL) 1200 MG capsule capsule, Take 1,200 mg by mouth 2 (Two) Times a Day., Disp: , Rfl:     ALLERGIES:  No Known Allergies    REVIEW OF SYSTEMS:  Review of Systems   Constitutional: Negative for chills, fever and unexpected weight change.   HENT: Negative for mouth sores, nosebleeds, sore throat and voice change.    Eyes: Negative for visual disturbance.   Respiratory: Positive for cough. Negative for shortness of " "breath.    Cardiovascular: Negative for chest pain and leg swelling.   Gastrointestinal: Negative for abdominal pain, blood in stool, constipation, diarrhea, nausea and vomiting.   Genitourinary: Negative for dysuria, frequency and hematuria.   Musculoskeletal: Negative for arthralgias, back pain and joint swelling.   Skin: Negative for rash.   Neurological: Negative for dizziness, numbness and headaches.   Hematological: Negative for adenopathy. Does not bruise/bleed easily.   Psychiatric/Behavioral: Negative for dysphoric mood. The patient is not nervous/anxious.          Objective   VITAL SIGNS:     Vitals:    10/29/18 1105   BP: 114/78   Pulse: 72   Resp: 14   Temp: 98.2 °F (36.8 °C)   TempSrc: Oral   SpO2: 97%   Weight: 78.5 kg (173 lb)   Height: 179 cm (70.47\")   PainSc: 0-No pain     Body mass index is 24.49 kg/m².     Wt Readings from Last 3 Encounters:   10/29/18 78.5 kg (173 lb)   10/01/18 79.1 kg (174 lb 4.8 oz)   08/29/18 79.5 kg (175 lb 3.2 oz)       PHYSICAL EXAMINATION:  GENERAL:  The patient appears in good general condition, not in acute distress.  SKIN: Warm and dry. No skin rashes, ecchymosis or petechiae.  HEAD:  Normocephalic.  EYES:  No Jaundice. No Pallor. Pupils equal. EOMI.  NEUROLOGICAL:  No Focal neurological deficits.       DIAGNOSTIC DATA:       Results from last 7 days  Lab Units 10/29/18  1049   WBC 10*3/mm3 4.97   NEUTROS ABS 10*3/mm3 3.60   HEMOGLOBIN g/dL 15.9   HEMATOCRIT % 45.5   PLATELETS 10*3/mm3 153     Component      Latest Ref Rng & Units 10/1/2018   Immature Reticulocyte Fraction      0.7 - 13.7 % 7.7   Reticulocyte %      0.50 - 1.50 % 1.20   Reticulocyte Hgb      32.7 - 38.6 pg 36.5   Methylmalonic Acid      0 - 378 nmol/L 131   Disclaimer:       Comment   Vitamin B-12      211 - 946 pg/mL 1,396 (H)   Folate      4.78 - 24.20 ng/mL >20.00   Haptoglobin      30 - 200 mg/dL 63     CT ABDOMEN AND PELVIS WITH CONTRAST 10/15/18:     HISTORY: Thrombocytopenia with leukopenia. " Abnormal liver enzymes.  Elevated bilirubin. Evaluate for hepatomegaly and splenomegaly.     TECHNIQUE: Axial CT images of the abdomen and pelvis were obtained  following administration of intravenous contrast. The patient was given  oral contrast Coronal and sagittal reformats were obtained.     COMPARISON: CT abdomen and pelvis from 02/23/2007     FINDINGS: The liver demonstrates normal attenuation. No focal hepatic  lesions or intrahepatic biliary dilatation. The spleen is normal in size  measuring 12 cm in craniocaudal dimension and approximately 9.6 cm in AP  dimension. No focal splenic lesion is identified. The pancreas is normal  without ductal dilatation. The gallbladder is unremarkable. Bilateral  adrenal glands are normal. Both kidneys are normal in size and  attenuation. Bilateral punctate nephroliths are identified. The largest  calculus within the midpole of the left kidney measures about 5 mm.  There are bilateral renal cortical cysts present, the largest within the  midpole of the left kidney measuring 3.8 x 3.9 cm. No hydronephrosis.  The urinary bladder is partially distended and normal. The prostate  gland is enlarged and indents the bladder base. The small and large  bowel loops demonstrate normal caliber. No pathological retroperitoneal  lymphadenopathy. There are bilateral L5 pars defects with  anterolisthesis of L5 on S1 measuring 1.1 cm. Small fat-containing  paraumbilical hernia is present.     IMPRESSION:  1. No hepatosplenomegaly identified. No intra or extrahepatic biliary  dilatation.  2. Bilateral nonobstructing nephrolithiasis.  3. Anterolisthesis of L5 on S1 with bilateral L5 pars defects.     Radiation dose reduction techniques were utilized, including automated  exposure control and exposure modulation based on body size.      Assessment/Plan   1.  Leukopenia with neutropenia.  It was first identified on 3/12/2018 and his counts worsened on 8/22/2018.  WBC and ANC are normal. today.   Improvement favors a reactive decrease in the count in March 2018 with subsequent improvement and argues against an underlying bone marrow pathology.    2.  Thrombocytopenia.  On review of labs from 2015, platelets were slightly low at 148,000.  They decreased to 122,000 on 11/20/2017.  Platelet count is normal today at 153,000.  There is no evidence of vitamin B12 or folate deficiency. Vitamin B12 level was elevated. He is taking B12 1000 mcg daily.  CT scan showed no evidence of splenomegaly.      3.  Elevated serum bilirubin.  On review of labs dating back 2015, patient had an elevated bilirubin level and has remained elevated since that time.  There is no evidence of liver abnormality on CT scan. There is no evidence of hemolysis.  ?Gilbert syndrome.      PLAN:    1.  Reduce vitamin B12 to once weekly.    2.  I did not recommend additional testing since his counts have normalized. We will not schedule him for a follow up visit at our office but he will continue to follow up with his primary care physician.      Yaakov Hernandez MD  10/29/18

## 2018-11-16 DIAGNOSIS — E29.1 HYPOGONADISM IN MALE: ICD-10-CM

## 2018-11-16 DIAGNOSIS — Z00.00 ROUTINE HEALTH MAINTENANCE: Primary | ICD-10-CM

## 2018-11-21 ENCOUNTER — CLINICAL SUPPORT (OUTPATIENT)
Dept: FAMILY MEDICINE CLINIC | Facility: CLINIC | Age: 63
End: 2018-11-21

## 2018-11-21 DIAGNOSIS — Z00.00 ROUTINE HEALTH MAINTENANCE: Primary | ICD-10-CM

## 2018-11-21 PROCEDURE — 85025 COMPLETE CBC W/AUTO DIFF WBC: CPT | Performed by: INTERNAL MEDICINE

## 2018-11-21 PROCEDURE — 71046 X-RAY EXAM CHEST 2 VIEWS: CPT | Performed by: INTERNAL MEDICINE

## 2018-11-21 PROCEDURE — 93000 ELECTROCARDIOGRAM COMPLETE: CPT | Performed by: INTERNAL MEDICINE

## 2018-11-23 LAB
ALBUMIN SERPL-MCNC: 4.3 G/DL (ref 3.5–5.2)
ALBUMIN/GLOB SERPL: 2.3 G/DL
ALP SERPL-CCNC: 51 U/L (ref 39–117)
ALT SERPL-CCNC: 18 U/L (ref 1–41)
APPEARANCE UR: CLEAR
AST SERPL-CCNC: 18 U/L (ref 1–40)
BILIRUB SERPL-MCNC: 1.6 MG/DL (ref 0.1–1.2)
BILIRUB UR QL STRIP: NEGATIVE
BUN SERPL-MCNC: 19 MG/DL (ref 8–23)
BUN/CREAT SERPL: 20.4 (ref 7–25)
CALCIUM SERPL-MCNC: 9.2 MG/DL (ref 8.6–10.5)
CHLORIDE SERPL-SCNC: 103 MMOL/L (ref 98–107)
CHOLEST SERPL-MCNC: 183 MG/DL (ref 0–200)
CO2 SERPL-SCNC: 25.7 MMOL/L (ref 22–29)
COLOR UR: YELLOW
CREAT SERPL-MCNC: 0.93 MG/DL (ref 0.76–1.27)
GLOBULIN SER CALC-MCNC: 1.9 GM/DL
GLUCOSE SERPL-MCNC: 88 MG/DL (ref 65–99)
GLUCOSE UR QL: NEGATIVE
HDLC SERPL-MCNC: 70 MG/DL (ref 40–60)
HGB UR QL STRIP: NEGATIVE
KETONES UR QL STRIP: NEGATIVE
LDLC SERPL CALC-MCNC: 101 MG/DL (ref 0–100)
LDLC/HDLC SERPL: 1.44 {RATIO}
LEUKOCYTE ESTERASE UR QL STRIP: NEGATIVE
NITRITE UR QL STRIP: NEGATIVE
PH UR STRIP: 6 [PH] (ref 5–8)
POTASSIUM SERPL-SCNC: 4.4 MMOL/L (ref 3.5–5.2)
PROT SERPL-MCNC: 6.2 G/DL (ref 6–8.5)
PROT UR QL STRIP: NEGATIVE
SODIUM SERPL-SCNC: 142 MMOL/L (ref 136–145)
SP GR UR: 1.02 (ref 1–1.03)
TESTOST FREE SERPL-MCNC: 5 PG/ML (ref 6.6–18.1)
TESTOST SERPL-MCNC: 687 NG/DL (ref 264–916)
TRIGL SERPL-MCNC: 62 MG/DL (ref 0–150)
UROBILINOGEN UR STRIP-MCNC: NORMAL MG/DL
VLDLC SERPL CALC-MCNC: 12.4 MG/DL (ref 5–40)

## 2018-12-05 ENCOUNTER — OFFICE VISIT (OUTPATIENT)
Dept: FAMILY MEDICINE CLINIC | Facility: CLINIC | Age: 63
End: 2018-12-05

## 2018-12-05 VITALS
DIASTOLIC BLOOD PRESSURE: 66 MMHG | HEIGHT: 70 IN | WEIGHT: 174.4 LBS | RESPIRATION RATE: 16 BRPM | OXYGEN SATURATION: 96 % | HEART RATE: 68 BPM | BODY MASS INDEX: 24.97 KG/M2 | SYSTOLIC BLOOD PRESSURE: 112 MMHG

## 2018-12-05 DIAGNOSIS — Z00.00 ROUTINE HEALTH MAINTENANCE: Primary | ICD-10-CM

## 2018-12-05 DIAGNOSIS — I10 ESSENTIAL HYPERTENSION: ICD-10-CM

## 2018-12-05 DIAGNOSIS — Z00.00 WELL ADULT EXAM: ICD-10-CM

## 2018-12-05 PROCEDURE — 71046 X-RAY EXAM CHEST 2 VIEWS: CPT | Performed by: INTERNAL MEDICINE

## 2018-12-05 PROCEDURE — 99397 PER PM REEVAL EST PAT 65+ YR: CPT | Performed by: INTERNAL MEDICINE

## 2018-12-07 ENCOUNTER — TELEPHONE (OUTPATIENT)
Dept: GASTROENTEROLOGY | Facility: CLINIC | Age: 63
End: 2018-12-07

## 2018-12-07 NOTE — TELEPHONE ENCOUNTER
Updated health history with patient. Reminded him of colonoscopy 12-10-18. He will arrive at 9 am.

## 2018-12-09 NOTE — PROGRESS NOTES
"Acacia Gresham is a 63 y.o. male. Patient is here today for   Chief Complaint   Patient presents with   • Annual Exam     cpe          Vitals:    12/05/18 1110   BP: 112/66   Pulse: 68   Resp: 16   SpO2: 96%       The following portions of the patient's history were reviewed and updated as appropriate: allergies, current medications, past family history, past medical history, past social history, past surgical history and problem list.    Past Medical History:   Diagnosis Date   • Disc disorder     Bulging   • Hyperlipidemia    • Hypertension    • Kidney stone 2007   • Leukopenia       No Known Allergies   Social History     Socioeconomic History   • Marital status:      Spouse name: Kimberly   • Number of children: Not on file   • Years of education: College   • Highest education level: Not on file   Social Needs   • Financial resource strain: Not on file   • Food insecurity - worry: Not on file   • Food insecurity - inability: Not on file   • Transportation needs - medical: Not on file   • Transportation needs - non-medical: Not on file   Occupational History     Employer: RETIRED   Tobacco Use   • Smoking status: Never Smoker   • Smokeless tobacco: Never Used   Substance and Sexual Activity   • Alcohol use: Yes     Alcohol/week: 2.4 oz     Types: 4 Cans of beer per week     Comment: \"4 beers on weekends\"    • Drug use: No   • Sexual activity: Defer   Other Topics Concern   • Not on file   Social History Narrative   • Not on file        Current Outpatient Medications:   •  aspirin 81 MG tablet, Take  by mouth Daily., Disp: , Rfl:   •  Cholecalciferol (VITAMIN D3) 2000 UNITS capsule, Take 2,000 Units by mouth Daily., Disp: , Rfl:   •  Omega-3 Fatty Acids (FISH OIL) 1200 MG capsule capsule, Take 1,200 mg by mouth 2 (Two) Times a Day., Disp: , Rfl:   •  Cyanocobalamin (B-12 PO), Take 1,000 mcg by mouth 1 (One) Time Per Week., Disp: , Rfl:   •  HAVRIX 1440 EL U/ML vaccine, , Disp: , Rfl:  "     EKG  ECG Report  For echocardiogram shows a normal sinus rhythm with a regular rate.    PA and lateral chest x-ray show no or chronic changes.  Objective   This pleasant 63-year-old male is here for a comprehensive physical exam.    He has no complaints.       Terrance Gresham 63 y.o. male who presents for an Annual Wellness Visit.  he has a history of   Patient Active Problem List   Diagnosis   • Hypogonadism in male   • Gastroesophageal reflux disease   • Paresthesia of lower extremity   • Thoracic back pain   • Erectile dysfunction of nonorganic origin   • Well adult exam   • Chest pain, atypical   • Leukopenia   • History of colon polyps   .  he has been doing well with new interval problems.  Labs results discussed in detail with the patient.  Plan to update vaccines if needed today.        Lab Results (most recent)     None            Review of Systems   Constitutional: Negative.    HENT: Negative.    Eyes: Negative.    Respiratory: Negative.    Cardiovascular: Negative.    Gastrointestinal: Negative.    Endocrine: Negative.    Genitourinary: Negative.    Musculoskeletal: Negative.    Allergic/Immunologic: Negative.    Neurological: Negative.    Hematological: Negative.    Psychiatric/Behavioral: Negative.        Physical Exam   Constitutional: He is oriented to person, place, and time. He appears well-developed and well-nourished. No distress.   Pleasant, neatly groomed, BMI 24.   HENT:   Head: Normocephalic and atraumatic.   Right Ear: External ear normal.   Left Ear: External ear normal.   Nose: Nose normal.   Mouth/Throat: Oropharynx is clear and moist. No oropharyngeal exudate.   Eyes: Conjunctivae and EOM are normal. Pupils are equal, round, and reactive to light. Right eye exhibits no discharge. Left eye exhibits no discharge. No scleral icterus.   Neck: Normal range of motion. Neck supple. No JVD present. No tracheal deviation present. No thyromegaly present.   Cardiovascular: Normal rate,  regular rhythm, normal heart sounds and intact distal pulses. Exam reveals no gallop and no friction rub.   No murmur heard.  Pulmonary/Chest: Effort normal and breath sounds normal. No stridor. No respiratory distress. He has no wheezes. He has no rales. He exhibits no tenderness.   Abdominal: Soft. Bowel sounds are normal. He exhibits no distension and no mass. There is no tenderness. There is no rebound and no guarding. No hernia.   Genitourinary: Rectum normal, prostate normal and penis normal. Rectal exam shows guaiac negative stool. No penile tenderness.   Musculoskeletal: Normal range of motion. He exhibits no edema, tenderness or deformity.   Lymphadenopathy:     He has no cervical adenopathy.   Neurological: He is alert and oriented to person, place, and time. He displays normal reflexes. No cranial nerve deficit or sensory deficit. He exhibits normal muscle tone. Coordination normal.   Skin: Skin is warm and dry. Capillary refill takes less than 2 seconds. No rash noted. He is not diaphoretic. No erythema. No pallor.   Psychiatric: He has a normal mood and affect. His behavior is normal. Judgment and thought content normal.   Nursing note and vitals reviewed.      ASSESSMENT       Problem List Items Addressed This Visit        Cardiovascular and Mediastinum    RESOLVED: Essential hypertension       Other    Well adult exam      Other Visit Diagnoses     Routine health maintenance    -  Primary    Relevant Orders    XR Chest PA & Lateral          PLAN  Handout reviewed his labs.  He enjoys excellent health.    I asked him follow-up in about 6 months.  There are no Patient Instructions on file for this visit.    No Follow-up on file.

## 2018-12-10 ENCOUNTER — ANESTHESIA (OUTPATIENT)
Dept: GASTROENTEROLOGY | Facility: HOSPITAL | Age: 63
End: 2018-12-10

## 2018-12-10 ENCOUNTER — HOSPITAL ENCOUNTER (OUTPATIENT)
Facility: HOSPITAL | Age: 63
Setting detail: HOSPITAL OUTPATIENT SURGERY
Discharge: HOME | End: 2018-12-10
Attending: INTERNAL MEDICINE | Admitting: INTERNAL MEDICINE

## 2018-12-10 ENCOUNTER — ANESTHESIA EVENT (OUTPATIENT)
Dept: GASTROENTEROLOGY | Facility: HOSPITAL | Age: 63
End: 2018-12-10

## 2018-12-10 VITALS
TEMPERATURE: 97.5 F | BODY MASS INDEX: 23.81 KG/M2 | HEART RATE: 55 BPM | SYSTOLIC BLOOD PRESSURE: 106 MMHG | HEIGHT: 71 IN | OXYGEN SATURATION: 97 % | DIASTOLIC BLOOD PRESSURE: 79 MMHG | WEIGHT: 170.1 LBS | RESPIRATION RATE: 16 BRPM

## 2018-12-10 DIAGNOSIS — Z86.010 HISTORY OF COLON POLYPS: ICD-10-CM

## 2018-12-10 PROCEDURE — 25010000002 PROPOFOL 10 MG/ML EMULSION: Performed by: ANESTHESIOLOGY

## 2018-12-10 PROCEDURE — G0105 COLORECTAL SCRN; HI RISK IND: HCPCS | Performed by: INTERNAL MEDICINE

## 2018-12-10 RX ORDER — LIDOCAINE HYDROCHLORIDE 20 MG/ML
INJECTION, SOLUTION INFILTRATION; PERINEURAL AS NEEDED
Status: DISCONTINUED | OUTPATIENT
Start: 2018-12-10 | End: 2018-12-10 | Stop reason: SURG

## 2018-12-10 RX ORDER — LIDOCAINE HYDROCHLORIDE 10 MG/ML
0.5 INJECTION, SOLUTION INFILTRATION; PERINEURAL ONCE AS NEEDED
Status: DISCONTINUED | OUTPATIENT
Start: 2018-12-10 | End: 2018-12-10 | Stop reason: HOSPADM

## 2018-12-10 RX ORDER — PROPOFOL 10 MG/ML
VIAL (ML) INTRAVENOUS CONTINUOUS PRN
Status: DISCONTINUED | OUTPATIENT
Start: 2018-12-10 | End: 2018-12-10 | Stop reason: SURG

## 2018-12-10 RX ORDER — PROPOFOL 10 MG/ML
VIAL (ML) INTRAVENOUS AS NEEDED
Status: DISCONTINUED | OUTPATIENT
Start: 2018-12-10 | End: 2018-12-10 | Stop reason: SURG

## 2018-12-10 RX ORDER — SODIUM CHLORIDE, SODIUM LACTATE, POTASSIUM CHLORIDE, CALCIUM CHLORIDE 600; 310; 30; 20 MG/100ML; MG/100ML; MG/100ML; MG/100ML
30 INJECTION, SOLUTION INTRAVENOUS CONTINUOUS
Status: DISCONTINUED | OUTPATIENT
Start: 2018-12-10 | End: 2018-12-10 | Stop reason: HOSPADM

## 2018-12-10 RX ORDER — SODIUM CHLORIDE 0.9 % (FLUSH) 0.9 %
3 SYRINGE (ML) INJECTION AS NEEDED
Status: DISCONTINUED | OUTPATIENT
Start: 2018-12-10 | End: 2018-12-10 | Stop reason: HOSPADM

## 2018-12-10 RX ADMIN — LIDOCAINE HYDROCHLORIDE 60 MG: 20 INJECTION, SOLUTION INFILTRATION; PERINEURAL at 10:38

## 2018-12-10 RX ADMIN — PROPOFOL 100 MCG/KG/MIN: 10 INJECTION, EMULSION INTRAVENOUS at 10:38

## 2018-12-10 RX ADMIN — SODIUM CHLORIDE, POTASSIUM CHLORIDE, SODIUM LACTATE AND CALCIUM CHLORIDE 30 ML/HR: 600; 310; 30; 20 INJECTION, SOLUTION INTRAVENOUS at 09:43

## 2018-12-10 RX ADMIN — PROPOFOL 100 MG: 10 INJECTION, EMULSION INTRAVENOUS at 10:38

## 2018-12-10 NOTE — DISCHARGE INSTRUCTIONS
For the next 24 hours patient needs to be with a responsible adult.    For 24 hours DO NOT drive, operate machinery, appliances, drink alcohol, make important decisions or sign legal documents.    Start with a light or bland diet and advance to regular diet as tolerated.    Follow recommendations on procedure report provided by your doctor.    Call Dr Montgomery for problems [phone]    Problems may include but not limited to: large amounts of bleeding, trouble breathing, repeated vomiting, severe unrelieved pain, fever or chills.

## 2018-12-10 NOTE — H&P
CC: Here for endoscopic evaluation.     HPI: History of colon polyps.       Past Medical History:   Diagnosis Date   • Disc disorder     Bulging   • Enlarged prostate    • Hx of colonic polyp    • Hyperlipidemia    • Hypertension    • Kidney stone 2007   • Leukopenia        Past Surgical History:   Procedure Laterality Date   • COLONOSCOPY W/ POLYPECTOMY     • CYST REMOVAL Right 2012    wrist    • KNEE ARTHROSCOPY Right 2015   • KNEE SURGERY Right 01/13/2016       Prior to Admission medications    Medication Sig Start Date End Date Taking? Authorizing Provider   aspirin 81 MG tablet Take  by mouth Daily. 9/5/13  Yes Serafin Linares MD   Cholecalciferol (VITAMIN D3) 2000 UNITS capsule Take 2,000 Units by mouth Daily. 11/5/15  Yes Serafin Linares MD   Cyanocobalamin (B-12 PO) Take 1,000 mcg by mouth 1 (One) Time Per Week.   Yes Serafin Linares MD   Omega-3 Fatty Acids (FISH OIL) 1200 MG capsule capsule Take 1,200 mg by mouth 2 (Two) Times a Day. 9/5/13  Yes Serafin Linares MD   HAVRIX 1440 EL U/ML vaccine  10/25/18   Serafin Linares MD       No Known Allergies    Family History   Problem Relation Age of Onset   • Stomach cancer Mother    • Stroke Mother    • Breast cancer Mother    • Lung cancer Mother    • Prostate cancer Father    • Alzheimer's disease Father    • Kidney disease Father    • Heart disease Father    • Hypertension Father    • Cancer Paternal Grandfather    • No Known Problems Sister    • Prostate cancer Brother 60   • Other Maternal Grandmother         Circulation issues; leg amputation   • Heart attack Maternal Grandfather    • Obesity Brother    • Sleep apnea Brother        OBJECTIVE:    Patient's vital signs reviewed. No acute distress.     Chest is clear, no wheezing or rales. Normal symmetric air entry throughout both lung fields. No chest wall deformities or tenderness.    S1 and S2 normal, no murmurs, clicks, gallops or rubs. Regular rate and rhythm. Chest is  clear; no wheezes or rales. No edema or JVD.    The abdomen is soft without tenderness, guarding, mass, rebound or organomegaly. Bowel sounds are normal. No CVA tenderness or inguinal adenopathy noted.    Patient is alert, oriented and with an intact memory.    Assessment: History of colon polyps.       Plan: Colonoscopy.

## 2018-12-10 NOTE — ANESTHESIA POSTPROCEDURE EVALUATION
Patient: Terrance Gresham    Procedure Summary     Date:  12/10/18 Room / Location:   [facility]    Anesthesia Start:  1038 Anesthesia Stop:  1113    Procedure:  COLONOSCOPY to cecuman ti (N/A ) Diagnosis:       History of colon polyps      (History of colon polyps [Z86.010])    Surgeon:  Jose Montgomery MD Provider:  Altagracia Miller MD    Anesthesia Type:  MAC ASA Status:  2          Anesthesia Type: MAC  Last vitals  BP   93/59 (12/10/18 1112)   Temp   36.4 °C (97.5 °F) (12/10/18 0934)   Pulse   59 (12/10/18 1112)   Resp   16 (12/10/18 1112)     SpO2   97 % (12/10/18 1112)     Post Anesthesia Care and Evaluation    Patient location during evaluation: bedside  Patient participation: complete - patient participated  Level of consciousness: awake  Pain management: adequate  Airway patency: patent  Anesthetic complications: No anesthetic complications    Cardiovascular status: acceptable  Respiratory status: acceptable  Hydration status: acceptable

## 2019-06-04 DIAGNOSIS — E29.1 HYPOGONADISM IN MALE: Primary | ICD-10-CM

## 2019-06-04 DIAGNOSIS — R17 ELEVATED BILIRUBIN: ICD-10-CM

## 2019-06-04 DIAGNOSIS — E78.00 ELEVATED LDL CHOLESTEROL LEVEL: ICD-10-CM

## 2019-06-09 LAB
ALBUMIN SERPL-MCNC: 4.3 G/DL (ref 3.5–5.2)
ALBUMIN/GLOB SERPL: 2.4 G/DL
ALP SERPL-CCNC: 49 U/L (ref 39–117)
ALT SERPL-CCNC: 12 U/L (ref 1–41)
AST SERPL-CCNC: 18 U/L (ref 1–40)
BASOPHILS # BLD AUTO: 0.04 10*3/MM3 (ref 0–0.2)
BASOPHILS NFR BLD AUTO: 1.2 % (ref 0–1.5)
BILIRUB SERPL-MCNC: 1.7 MG/DL (ref 0.2–1.2)
BUN SERPL-MCNC: 13 MG/DL (ref 8–23)
BUN/CREAT SERPL: 14.9 (ref 7–25)
CALCIUM SERPL-MCNC: 9.8 MG/DL (ref 8.6–10.5)
CHLORIDE SERPL-SCNC: 104 MMOL/L (ref 98–107)
CHOLEST SERPL-MCNC: 189 MG/DL (ref 0–200)
CO2 SERPL-SCNC: 26.7 MMOL/L (ref 22–29)
CREAT SERPL-MCNC: 0.87 MG/DL (ref 0.76–1.27)
EOSINOPHIL # BLD AUTO: 0.05 10*3/MM3 (ref 0–0.4)
EOSINOPHIL NFR BLD AUTO: 1.4 % (ref 0.3–6.2)
ERYTHROCYTE [DISTWIDTH] IN BLOOD BY AUTOMATED COUNT: 13 % (ref 12.3–15.4)
GLOBULIN SER CALC-MCNC: 1.8 GM/DL
GLUCOSE SERPL-MCNC: 85 MG/DL (ref 65–99)
HCT VFR BLD AUTO: 47.7 % (ref 37.5–51)
HDLC SERPL-MCNC: 66 MG/DL (ref 40–60)
HGB BLD-MCNC: 15.3 G/DL (ref 13–17.7)
IMM GRANULOCYTES # BLD AUTO: 0.01 10*3/MM3 (ref 0–0.05)
IMM GRANULOCYTES NFR BLD AUTO: 0.3 % (ref 0–0.5)
LDLC SERPL CALC-MCNC: 109 MG/DL (ref 0–100)
LDLC/HDLC SERPL: 1.65 {RATIO}
LYMPHOCYTES # BLD AUTO: 1.14 10*3/MM3 (ref 0.7–3.1)
LYMPHOCYTES NFR BLD AUTO: 33 % (ref 19.6–45.3)
MCH RBC QN AUTO: 32.1 PG (ref 26.6–33)
MCHC RBC AUTO-ENTMCNC: 32.1 G/DL (ref 31.5–35.7)
MCV RBC AUTO: 100 FL (ref 79–97)
MONOCYTES # BLD AUTO: 0.31 10*3/MM3 (ref 0.1–0.9)
MONOCYTES NFR BLD AUTO: 9 % (ref 5–12)
NEUTROPHILS # BLD AUTO: 1.9 10*3/MM3 (ref 1.7–7)
NEUTROPHILS NFR BLD AUTO: 55.1 % (ref 42.7–76)
NRBC BLD AUTO-RTO: 0 /100 WBC (ref 0–0.2)
PLATELET # BLD AUTO: 131 10*3/MM3 (ref 140–450)
POTASSIUM SERPL-SCNC: 4.8 MMOL/L (ref 3.5–5.2)
PROT SERPL-MCNC: 6.1 G/DL (ref 6–8.5)
RBC # BLD AUTO: 4.77 10*6/MM3 (ref 4.14–5.8)
SODIUM SERPL-SCNC: 142 MMOL/L (ref 136–145)
TESTOST FREE SERPL-MCNC: 4.9 PG/ML (ref 6.6–18.1)
TESTOST SERPL-MCNC: 747.8 NG/DL (ref 264–916)
TRIGL SERPL-MCNC: 71 MG/DL (ref 0–150)
VLDLC SERPL CALC-MCNC: 14.2 MG/DL
WBC # BLD AUTO: 3.45 10*3/MM3 (ref 3.4–10.8)

## 2019-06-12 ENCOUNTER — OFFICE VISIT (OUTPATIENT)
Dept: FAMILY MEDICINE CLINIC | Facility: CLINIC | Age: 64
End: 2019-06-12

## 2019-06-12 VITALS
TEMPERATURE: 98.7 F | BODY MASS INDEX: 23.94 KG/M2 | RESPIRATION RATE: 16 BRPM | HEART RATE: 67 BPM | HEIGHT: 71 IN | OXYGEN SATURATION: 98 % | SYSTOLIC BLOOD PRESSURE: 118 MMHG | DIASTOLIC BLOOD PRESSURE: 68 MMHG | WEIGHT: 171 LBS

## 2019-06-12 DIAGNOSIS — E29.1 HYPOGONADISM IN MALE: Primary | ICD-10-CM

## 2019-06-12 PROCEDURE — 99213 OFFICE O/P EST LOW 20 MIN: CPT | Performed by: INTERNAL MEDICINE

## 2019-06-12 NOTE — PROGRESS NOTES
"Acacia Gresham is a 63 y.o. male. Patient is here today for   Chief Complaint   Patient presents with   • Hypertension          Vitals:    06/12/19 1043   BP: 118/68   Pulse: 67   Resp: 16   Temp: 98.7 °F (37.1 °C)   SpO2: 98%       Past Medical History:   Diagnosis Date   • Disc disorder     Bulging   • Enlarged prostate    • Hx of colonic polyp    • Hyperlipidemia    • Hypertension    • Kidney stone 2007   • Leukopenia       No Known Allergies   Social History     Socioeconomic History   • Marital status:      Spouse name: Kimberly   • Number of children: Not on file   • Years of education: College   • Highest education level: Not on file   Occupational History     Employer: RETIRED   Tobacco Use   • Smoking status: Never Smoker   • Smokeless tobacco: Never Used   Substance and Sexual Activity   • Alcohol use: Yes     Alcohol/week: 2.4 oz     Types: 4 Cans of beer per week     Comment: \"4 beers on weekends\"    • Drug use: No   • Sexual activity: Defer        Current Outpatient Medications:   •  aspirin 81 MG tablet, Take  by mouth Daily., Disp: , Rfl:   •  Cholecalciferol (VITAMIN D3) 2000 UNITS capsule, Take 2,000 Units by mouth Daily., Disp: , Rfl:   •  Cyanocobalamin (B-12 PO), Take 1,000 mcg by mouth 1 (One) Time Per Week., Disp: , Rfl:   •  HAVRIX 1440 EL U/ML vaccine, , Disp: , Rfl:   •  Omega-3 Fatty Acids (FISH OIL) 1200 MG capsule capsule, Take 1,200 mg by mouth 2 (Two) Times a Day., Disp: , Rfl:      Objective     He is here to follow-up on hypertension.    He had lab work done last week.    He continues to get regular aerobic activity.  He is maintained an appropriate weight for more than a year now.             Review of Systems   Constitutional: Negative.    HENT: Negative.    Respiratory: Negative.    Cardiovascular: Negative.    Genitourinary: Negative.    Musculoskeletal: Negative.    Neurological: Negative.    Psychiatric/Behavioral: Negative.        Physical Exam "   Constitutional: He is oriented to person, place, and time. He appears well-developed and well-nourished.   HENT:   Head: Normocephalic and atraumatic.   Neck:   No carotid bruit   Cardiovascular: Normal rate, regular rhythm and normal heart sounds.   Pulmonary/Chest: Effort normal and breath sounds normal. No respiratory distress. He has no wheezes. He has no rales.   Neurological: He is alert and oriented to person, place, and time.   Psychiatric: He has a normal mood and affect.   Nursing note and vitals reviewed.        Problem List Items Addressed This Visit        Endocrine    Hypogonadism in male - Primary            PLAN  His labs actually look pretty good.  He does have hypogonadism but he feels well he denies any erectile difficulty.  Plan to replace it.    He got to follow-up for a comprehensive physical examination once yearly.  I will see him about 6 months.  No Follow-up on file.

## 2019-12-02 DIAGNOSIS — Z12.5 SCREENING PSA (PROSTATE SPECIFIC ANTIGEN): ICD-10-CM

## 2019-12-02 DIAGNOSIS — E29.1 HYPOGONADISM IN MALE: ICD-10-CM

## 2019-12-02 DIAGNOSIS — Z00.00 ROUTINE HEALTH MAINTENANCE: Primary | ICD-10-CM

## 2019-12-11 ENCOUNTER — CLINICAL SUPPORT (OUTPATIENT)
Dept: FAMILY MEDICINE CLINIC | Facility: CLINIC | Age: 64
End: 2019-12-11

## 2019-12-11 DIAGNOSIS — Z00.00 ROUTINE HEALTH MAINTENANCE: Primary | ICD-10-CM

## 2019-12-11 PROCEDURE — 93000 ELECTROCARDIOGRAM COMPLETE: CPT | Performed by: INTERNAL MEDICINE

## 2019-12-11 PROCEDURE — 85025 COMPLETE CBC W/AUTO DIFF WBC: CPT | Performed by: INTERNAL MEDICINE

## 2019-12-11 PROCEDURE — 71046 X-RAY EXAM CHEST 2 VIEWS: CPT | Performed by: INTERNAL MEDICINE

## 2019-12-12 LAB
ALBUMIN SERPL-MCNC: 4.4 G/DL (ref 3.5–5.2)
ALBUMIN/GLOB SERPL: 2.1 G/DL
ALP SERPL-CCNC: 52 U/L (ref 39–117)
ALT SERPL-CCNC: 9 U/L (ref 1–41)
APPEARANCE UR: CLEAR
AST SERPL-CCNC: 15 U/L (ref 1–40)
BACTERIA #/AREA URNS HPF: ABNORMAL /HPF
BILIRUB SERPL-MCNC: 0.9 MG/DL (ref 0.2–1.2)
BILIRUB UR QL STRIP: NEGATIVE
BUN SERPL-MCNC: 17 MG/DL (ref 8–23)
BUN/CREAT SERPL: 19.5 (ref 7–25)
CALCIUM SERPL-MCNC: 9 MG/DL (ref 8.6–10.5)
CASTS URNS MICRO: ABNORMAL
CHLORIDE SERPL-SCNC: 102 MMOL/L (ref 98–107)
CHOLEST SERPL-MCNC: 181 MG/DL (ref 0–200)
CO2 SERPL-SCNC: 29.5 MMOL/L (ref 22–29)
COLOR UR: YELLOW
CREAT SERPL-MCNC: 0.87 MG/DL (ref 0.76–1.27)
EPI CELLS #/AREA URNS HPF: ABNORMAL /HPF
GLOBULIN SER CALC-MCNC: 2.1 GM/DL
GLUCOSE SERPL-MCNC: 84 MG/DL (ref 65–99)
GLUCOSE UR QL: NEGATIVE
HDLC SERPL-MCNC: 57 MG/DL (ref 40–60)
HGB UR QL STRIP: ABNORMAL
KETONES UR QL STRIP: NEGATIVE
LDLC SERPL CALC-MCNC: 110 MG/DL (ref 0–100)
LDLC/HDLC SERPL: 1.93 {RATIO}
LEUKOCYTE ESTERASE UR QL STRIP: NEGATIVE
NITRITE UR QL STRIP: NEGATIVE
PH UR STRIP: <=5 [PH] (ref 5–8)
POTASSIUM SERPL-SCNC: 4.4 MMOL/L (ref 3.5–5.2)
PROT SERPL-MCNC: 6.5 G/DL (ref 6–8.5)
PROT UR QL STRIP: NEGATIVE
PSA SERPL-MCNC: 1.3 NG/ML (ref 0–4)
RBC #/AREA URNS HPF: ABNORMAL /HPF
SODIUM SERPL-SCNC: 140 MMOL/L (ref 136–145)
SP GR UR: 1.02 (ref 1–1.03)
TESTOST FREE SERPL-MCNC: 10.4 PG/ML (ref 6.6–18.1)
TESTOST SERPL-MCNC: 606 NG/DL (ref 264–916)
TRIGL SERPL-MCNC: 69 MG/DL (ref 0–150)
UROBILINOGEN UR STRIP-MCNC: ABNORMAL MG/DL
VLDLC SERPL CALC-MCNC: 13.8 MG/DL
WBC #/AREA URNS HPF: ABNORMAL /HPF

## 2019-12-18 ENCOUNTER — OFFICE VISIT (OUTPATIENT)
Dept: FAMILY MEDICINE CLINIC | Facility: CLINIC | Age: 64
End: 2019-12-18

## 2019-12-18 VITALS
DIASTOLIC BLOOD PRESSURE: 82 MMHG | RESPIRATION RATE: 18 BRPM | SYSTOLIC BLOOD PRESSURE: 124 MMHG | HEIGHT: 71 IN | WEIGHT: 172.4 LBS | HEART RATE: 71 BPM | TEMPERATURE: 98 F | BODY MASS INDEX: 24.14 KG/M2

## 2019-12-18 DIAGNOSIS — Z00.00 WELL ADULT EXAM: ICD-10-CM

## 2019-12-18 DIAGNOSIS — R31.9 HEMATURIA, UNSPECIFIED TYPE: ICD-10-CM

## 2019-12-18 DIAGNOSIS — Z00.00 ROUTINE HEALTH MAINTENANCE: Primary | ICD-10-CM

## 2019-12-18 LAB
BILIRUB BLD-MCNC: NEGATIVE MG/DL
CLARITY, POC: CLEAR
COLOR UR: YELLOW
GLUCOSE UR STRIP-MCNC: NEGATIVE MG/DL
KETONES UR QL: NEGATIVE
LEUKOCYTE EST, POC: NEGATIVE
NITRITE UR-MCNC: NEGATIVE MG/ML
PH UR: 5.5 [PH] (ref 5–8)
PROT UR STRIP-MCNC: NEGATIVE MG/DL
RBC # UR STRIP: NEGATIVE /UL
SP GR UR: 1.02 (ref 1–1.03)
UROBILINOGEN UR QL: NORMAL

## 2019-12-18 PROCEDURE — 71046 X-RAY EXAM CHEST 2 VIEWS: CPT | Performed by: INTERNAL MEDICINE

## 2019-12-18 PROCEDURE — 81003 URINALYSIS AUTO W/O SCOPE: CPT | Performed by: INTERNAL MEDICINE

## 2019-12-18 PROCEDURE — 99397 PER PM REEVAL EST PAT 65+ YR: CPT | Performed by: INTERNAL MEDICINE

## 2019-12-18 RX ORDER — FLUTICASONE PROPIONATE 50 MCG
SPRAY, SUSPENSION (ML) NASAL
Refills: 0 | COMMUNITY
Start: 2019-11-25 | End: 2021-01-13

## 2019-12-22 NOTE — PROGRESS NOTES
"Acacia Gresham is a 64 y.o. male. Patient is here today for   Chief Complaint   Patient presents with   • Annual Exam     CPE          Vitals:    12/18/19 1053   BP: 124/82   Pulse: 71   Resp: 18   Temp: 98 °F (36.7 °C)     Body mass index is 24.06 kg/m².    The following portions of the patient's history were reviewed and updated as appropriate: allergies, current medications, past family history, past medical history, past social history, past surgical history and problem list.    Past Medical History:   Diagnosis Date   • Disc disorder     Bulging   • Enlarged prostate    • Hx of colonic polyp    • Hyperlipidemia    • Hypertension    • Kidney stone 2007   • Leukopenia       No Known Allergies   Social History     Socioeconomic History   • Marital status:      Spouse name: Kimberly   • Number of children: Not on file   • Years of education: College   • Highest education level: Not on file   Occupational History     Employer: RETIRED   Tobacco Use   • Smoking status: Never Smoker   • Smokeless tobacco: Never Used   Substance and Sexual Activity   • Alcohol use: Yes     Alcohol/week: 4.0 standard drinks     Types: 4 Cans of beer per week     Comment: \"4 beers on weekends\"    • Drug use: No   • Sexual activity: Defer        Current Outpatient Medications:   •  aspirin 81 MG tablet, Take  by mouth Daily., Disp: , Rfl:   •  Cholecalciferol (VITAMIN D3) 2000 UNITS capsule, Take 2,000 Units by mouth Daily., Disp: , Rfl:   •  Cyanocobalamin (B-12 PO), Take 1,000 mcg by mouth 1 (One) Time Per Week., Disp: , Rfl:   •  fluticasone (FLONASE) 50 MCG/ACT nasal spray, SHAKE LQ AND U 1 SPR IEN BID FOR 10 DAYS, Disp: , Rfl: 0  •  Omega-3 Fatty Acids (FISH OIL) 1200 MG capsule capsule, Take 1,200 mg by mouth 2 (Two) Times a Day., Disp: , Rfl:      EKG  ECG Report  Electrocardiogram showed a normal sinus rhythm with a regular rate.  There was no change compared to previous EKGs.    PA and lateral chest x-ray show " no acute or chronic changes.      Objective   This pleasant 64-year-old male is here for a comprehensive physical exam.    He has no complaints.     Terrance Gresham 64 y.o. male who presents for an Annual Wellness Visit.  he has a history of   Patient Active Problem List   Diagnosis   • Gastroesophageal reflux disease   • Paresthesia of lower extremity   • Thoracic back pain   • Erectile dysfunction of nonorganic origin   • Well adult exam   • Chest pain, atypical   • Leukopenia   • History of colon polyps   .  he has been doing well with new interval problems.  Labs results discussed in detail with the patient.  Plan to update vaccines if needed today.      Lab Results (most recent)     Procedure Component Value Units Date/Time    POC Urinalysis Dipstick, Automated [[id number]]  (Normal) Collected:  12/18/19 1153    Specimen:  Urine Updated:  12/18/19 1154     Color Yellow     Clarity, UA Clear     Specific Gravity  1.025     pH, Urine 5.5     Leukocytes Negative     Nitrite, UA Negative     Protein, POC Negative mg/dL      Glucose, UA Negative mg/dL      Ketones, UA Negative     Urobilinogen, UA Normal     Bilirubin Negative     Blood, UA Negative            Review of Systems   Constitutional: Negative.    HENT: Negative.    Eyes: Negative.    Respiratory: Negative.    Cardiovascular: Negative.    Gastrointestinal: Negative.    Endocrine: Negative.    Genitourinary: Negative.    Musculoskeletal: Negative.    Skin: Negative.    Allergic/Immunologic: Negative.    Neurological: Negative.    Hematological: Negative.    Psychiatric/Behavioral: Negative.        Physical Exam   Constitutional: He is oriented to person, place, and time. He appears well-developed and well-nourished.   Pleasant, neatly groomed, BMI 24.   HENT:   Head: Normocephalic.   Right Ear: External ear normal.   Left Ear: External ear normal.   Nose: Nose normal.   Mouth/Throat: Oropharynx is clear and moist. No oropharyngeal exudate.   Eyes: Pupils  are equal, round, and reactive to light. Conjunctivae and EOM are normal. Right eye exhibits no discharge. Left eye exhibits no discharge. No scleral icterus.   Neck: Normal range of motion. Neck supple. No JVD present. No tracheal deviation present. No thyromegaly present.   Cardiovascular: Normal rate, regular rhythm, normal heart sounds and intact distal pulses. Exam reveals no gallop and no friction rub.   No murmur heard.  Pulmonary/Chest: Effort normal and breath sounds normal. No stridor. No respiratory distress. He has no wheezes. He has no rales. He exhibits no tenderness.   Abdominal: Soft. Bowel sounds are normal. He exhibits no distension and no mass. There is no tenderness. There is no rebound and no guarding. No hernia.   Genitourinary: Rectum normal, prostate normal and penis normal. Rectal exam shows guaiac negative stool. No penile tenderness.   Musculoskeletal: Normal range of motion. He exhibits no edema, tenderness or deformity.   Lymphadenopathy:     He has no cervical adenopathy.   Neurological: He is alert and oriented to person, place, and time. He displays normal reflexes. No cranial nerve deficit or sensory deficit. He exhibits normal muscle tone. Coordination normal.   Skin: Skin is warm and dry. Capillary refill takes less than 2 seconds. No rash noted. No erythema. No pallor.   Psychiatric: He has a normal mood and affect. His behavior is normal. Judgment and thought content normal.   Nursing note and vitals reviewed.      ASSESSMENT       Problem List Items Addressed This Visit        Other    Well adult exam      Other Visit Diagnoses     Routine health maintenance    -  Primary    Relevant Orders    XR Chest PA & Lateral (Completed)    Hematuria, unspecified type        Relevant Orders    POC Urinalysis Dipstick, Automated (Completed)          PLAN  He is a very fit 64-year-old.  Since his longterm years ago he has lost about 70 pounds which he is achieved through regular aerobic  activity (mostly on the treadmill most days of the week for about 45 minutes to an hour each time), and changes in his diet for the better.    He used to have hypogonadism but this is resolved since he has had his weight loss.    Labs done prior to his visit today suggested red blood cells and his urine on repeat urinalysis today there was no red blood cell in his urinalysis.    I told him he could stop taking daily 81 mg aspirin.  I do not think he needs it.    Follow-up in a year or sooner as needed.  There are no Patient Instructions on file for this visit.    No follow-ups on file.

## 2020-03-24 ENCOUNTER — OFFICE VISIT (OUTPATIENT)
Dept: FAMILY MEDICINE CLINIC | Facility: CLINIC | Age: 65
End: 2020-03-24

## 2020-03-24 VITALS
HEIGHT: 71 IN | TEMPERATURE: 98.5 F | RESPIRATION RATE: 18 BRPM | WEIGHT: 168.8 LBS | HEART RATE: 78 BPM | BODY MASS INDEX: 23.63 KG/M2 | OXYGEN SATURATION: 98 % | SYSTOLIC BLOOD PRESSURE: 124 MMHG | DIASTOLIC BLOOD PRESSURE: 66 MMHG

## 2020-03-24 DIAGNOSIS — F41.8 DEPRESSION WITH ANXIETY: Primary | ICD-10-CM

## 2020-03-24 PROCEDURE — 99214 OFFICE O/P EST MOD 30 MIN: CPT | Performed by: INTERNAL MEDICINE

## 2020-03-24 RX ORDER — CLONAZEPAM 0.5 MG/1
0.5 TABLET ORAL 2 TIMES DAILY PRN
Qty: 20 TABLET | Refills: 1 | Status: SHIPPED | OUTPATIENT
Start: 2020-03-24 | End: 2021-01-13

## 2020-03-24 RX ORDER — ESCITALOPRAM OXALATE 10 MG/1
10 TABLET ORAL DAILY
Qty: 30 TABLET | Refills: 5 | Status: SHIPPED | OUTPATIENT
Start: 2020-03-24 | End: 2021-01-13

## 2020-03-24 NOTE — PROGRESS NOTES
"Acacia Gresham is a 64 y.o. male. Patient is here today for   Chief Complaint   Patient presents with   • Anxiety   • Depression   • Tinnitus   • Rapid Heart Rate          Vitals:    03/24/20 0813   BP: 124/66   Pulse: 78   Resp: 18   Temp: 98.5 °F (36.9 °C)   SpO2: 98%     Body mass index is 23.55 kg/m².      Past Medical History:   Diagnosis Date   • Disc disorder     Bulging   • Enlarged prostate    • Hx of colonic polyp    • Hyperlipidemia    • Hypertension    • Kidney stone 2007   • Leukopenia       No Known Allergies   Social History     Socioeconomic History   • Marital status:      Spouse name: Kimberly   • Number of children: Not on file   • Years of education: College   • Highest education level: Not on file   Occupational History     Employer: RETIRED   Tobacco Use   • Smoking status: Never Smoker   • Smokeless tobacco: Never Used   Substance and Sexual Activity   • Alcohol use: Yes     Alcohol/week: 4.0 standard drinks     Types: 4 Cans of beer per week     Comment: \"4 beers on weekends\"    • Drug use: No   • Sexual activity: Defer        Current Outpatient Medications:   •  aspirin 81 MG tablet, Take  by mouth Daily., Disp: , Rfl:   •  Cholecalciferol (VITAMIN D3) 2000 UNITS capsule, Take 2,000 Units by mouth Daily., Disp: , Rfl:   •  Cyanocobalamin (B-12 PO), Take 1,000 mcg by mouth 1 (One) Time Per Week., Disp: , Rfl:   •  Omega-3 Fatty Acids (FISH OIL) 1200 MG capsule capsule, Take 1,200 mg by mouth 2 (Two) Times a Day., Disp: , Rfl:   •  clonazePAM (KlonoPIN) 0.5 MG tablet, Take 1 tablet by mouth 2 (Two) Times a Day As Needed for Anxiety., Disp: 20 tablet, Rfl: 1  •  escitalopram (Lexapro) 10 MG tablet, Take 1 tablet by mouth Daily., Disp: 30 tablet, Rfl: 5  •  fluticasone (FLONASE) 50 MCG/ACT nasal spray, SHAKE LQ AND U 1 SPR IEN BID FOR 10 DAYS, Disp: , Rfl: 0     Objective     He is anxious.  Way the world is recently with code at 19 virus and the economy and his family " personally, he is quite anxious.         Review of Systems   Constitutional: Negative.    HENT: Negative.    Respiratory: Negative.    Cardiovascular: Negative.    Psychiatric/Behavioral: Positive for dysphoric mood.        He has anxiety and depression.       Physical Exam   Constitutional: He is oriented to person, place, and time. He appears well-developed and well-nourished.   Pleasant, neatly groomed, in no distress.   HENT:   Head: Normocephalic and atraumatic.   Cardiovascular: Normal rate and regular rhythm.   Pulmonary/Chest: Effort normal.   Neurological: He is alert and oriented to person, place, and time.   Psychiatric: He has a normal mood and affect. His behavior is normal.   Nursing note and vitals reviewed.        Problem List Items Addressed This Visit        Other    Depression with anxiety - Primary    Relevant Medications    escitalopram (Lexapro) 10 MG tablet            PLAN  To start him on S-Citalopram 10 mg daily.    I would like him to take clonazepam 0.5 mg twice daily as needed.  I gave him 20 with 1 refill.  I cautioned him not to drink alcohol and to take this medication or drive a vehicle while taking this medication.    I asked him to follow-up in 4 to 6 weeks to see how is getting along.  No follow-ups on file.

## 2020-06-09 DIAGNOSIS — E78.00 HYPERCHOLESTEROLEMIA: ICD-10-CM

## 2020-06-09 DIAGNOSIS — I10 ESSENTIAL HYPERTENSION: ICD-10-CM

## 2020-06-09 DIAGNOSIS — E29.1 HYPOGONADISM IN MALE: Primary | ICD-10-CM

## 2020-06-17 ENCOUNTER — RESULTS ENCOUNTER (OUTPATIENT)
Dept: FAMILY MEDICINE CLINIC | Facility: CLINIC | Age: 65
End: 2020-06-17

## 2020-06-17 DIAGNOSIS — E29.1 HYPOGONADISM IN MALE: ICD-10-CM

## 2020-06-17 DIAGNOSIS — E78.00 HYPERCHOLESTEROLEMIA: ICD-10-CM

## 2020-06-17 DIAGNOSIS — I10 ESSENTIAL HYPERTENSION: ICD-10-CM

## 2020-06-21 LAB
ALBUMIN SERPL-MCNC: 4.4 G/DL (ref 3.5–5.2)
ALBUMIN/GLOB SERPL: 2.4 G/DL
ALP SERPL-CCNC: 53 U/L (ref 39–117)
ALT SERPL-CCNC: 12 U/L (ref 1–41)
APPEARANCE UR: CLEAR
AST SERPL-CCNC: 18 U/L (ref 1–40)
BACTERIA #/AREA URNS HPF: NORMAL /HPF
BASOPHILS # BLD AUTO: 0.02 10*3/MM3 (ref 0–0.2)
BASOPHILS NFR BLD AUTO: 0.6 % (ref 0–1.5)
BILIRUB SERPL-MCNC: 1.4 MG/DL (ref 0.2–1.2)
BILIRUB UR QL STRIP: NEGATIVE
BUN SERPL-MCNC: 16 MG/DL (ref 8–23)
BUN/CREAT SERPL: 16.8 (ref 7–25)
CALCIUM SERPL-MCNC: 9.1 MG/DL (ref 8.6–10.5)
CASTS URNS MICRO: NORMAL
CHLORIDE SERPL-SCNC: 104 MMOL/L (ref 98–107)
CHOLEST SERPL-MCNC: 185 MG/DL (ref 0–200)
CO2 SERPL-SCNC: 27.6 MMOL/L (ref 22–29)
COLOR UR: YELLOW
CREAT SERPL-MCNC: 0.95 MG/DL (ref 0.76–1.27)
EOSINOPHIL # BLD AUTO: 0.06 10*3/MM3 (ref 0–0.4)
EOSINOPHIL NFR BLD AUTO: 1.9 % (ref 0.3–6.2)
EPI CELLS #/AREA URNS HPF: NORMAL /HPF
ERYTHROCYTE [DISTWIDTH] IN BLOOD BY AUTOMATED COUNT: 11.8 % (ref 12.3–15.4)
GLOBULIN SER CALC-MCNC: 1.8 GM/DL
GLUCOSE SERPL-MCNC: 98 MG/DL (ref 65–99)
GLUCOSE UR QL: NEGATIVE
HCT VFR BLD AUTO: 43.7 % (ref 37.5–51)
HDLC SERPL-MCNC: 73 MG/DL (ref 40–60)
HGB BLD-MCNC: 15.1 G/DL (ref 13–17.7)
HGB UR QL STRIP: NEGATIVE
IMM GRANULOCYTES # BLD AUTO: 0.01 10*3/MM3 (ref 0–0.05)
IMM GRANULOCYTES NFR BLD AUTO: 0.3 % (ref 0–0.5)
KETONES UR QL STRIP: NEGATIVE
LDLC SERPL CALC-MCNC: 101 MG/DL (ref 0–100)
LDLC/HDLC SERPL: 1.38 {RATIO}
LEUKOCYTE ESTERASE UR QL STRIP: ABNORMAL
LYMPHOCYTES # BLD AUTO: 0.87 10*3/MM3 (ref 0.7–3.1)
LYMPHOCYTES NFR BLD AUTO: 26.9 % (ref 19.6–45.3)
MCH RBC QN AUTO: 33.2 PG (ref 26.6–33)
MCHC RBC AUTO-ENTMCNC: 34.6 G/DL (ref 31.5–35.7)
MCV RBC AUTO: 96 FL (ref 79–97)
MONOCYTES # BLD AUTO: 0.32 10*3/MM3 (ref 0.1–0.9)
MONOCYTES NFR BLD AUTO: 9.9 % (ref 5–12)
NEUTROPHILS # BLD AUTO: 1.95 10*3/MM3 (ref 1.7–7)
NEUTROPHILS NFR BLD AUTO: 60.4 % (ref 42.7–76)
NITRITE UR QL STRIP: NEGATIVE
NRBC BLD AUTO-RTO: 0 /100 WBC (ref 0–0.2)
PH UR STRIP: 6.5 [PH] (ref 5–8)
PLATELET # BLD AUTO: 134 10*3/MM3 (ref 140–450)
POTASSIUM SERPL-SCNC: 4.4 MMOL/L (ref 3.5–5.2)
PROT SERPL-MCNC: 6.2 G/DL (ref 6–8.5)
PROT UR QL STRIP: NEGATIVE
RBC # BLD AUTO: 4.55 10*6/MM3 (ref 4.14–5.8)
RBC #/AREA URNS HPF: NORMAL /HPF
SODIUM SERPL-SCNC: 139 MMOL/L (ref 136–145)
SP GR UR: 1.02 (ref 1–1.03)
TESTOST FREE SERPL-MCNC: 4.6 PG/ML (ref 6.6–18.1)
TESTOST SERPL-MCNC: 653.1 NG/DL (ref 264–916)
TRIGL SERPL-MCNC: 57 MG/DL (ref 0–150)
UROBILINOGEN UR STRIP-MCNC: ABNORMAL MG/DL
VLDLC SERPL CALC-MCNC: 11.4 MG/DL
WBC # BLD AUTO: 3.23 10*3/MM3 (ref 3.4–10.8)
WBC #/AREA URNS HPF: NORMAL /HPF

## 2020-07-01 ENCOUNTER — OFFICE VISIT (OUTPATIENT)
Dept: FAMILY MEDICINE CLINIC | Facility: CLINIC | Age: 65
End: 2020-07-01

## 2020-07-01 VITALS
TEMPERATURE: 97.6 F | SYSTOLIC BLOOD PRESSURE: 110 MMHG | BODY MASS INDEX: 24.97 KG/M2 | OXYGEN SATURATION: 98 % | DIASTOLIC BLOOD PRESSURE: 64 MMHG | HEART RATE: 65 BPM | WEIGHT: 174.4 LBS | HEIGHT: 70 IN

## 2020-07-01 DIAGNOSIS — F41.8 DEPRESSION WITH ANXIETY: Primary | ICD-10-CM

## 2020-07-01 DIAGNOSIS — E29.1 HYPOGONADISM IN MALE: ICD-10-CM

## 2020-07-01 PROCEDURE — 99213 OFFICE O/P EST LOW 20 MIN: CPT | Performed by: INTERNAL MEDICINE

## 2020-07-01 NOTE — PROGRESS NOTES
"Acacia Gresham is a 64 y.o. male. Patient is here today for   Chief Complaint   Patient presents with   • Anxiety     Lab Follow Up          Vitals:    07/01/20 0956   BP: 110/64   Pulse: 65   Temp: 97.6 °F (36.4 °C)   SpO2: 98%     Body mass index is 25.02 kg/m².      Past Medical History:   Diagnosis Date   • Disc disorder     Bulging   • Enlarged prostate    • Hx of colonic polyp    • Hyperlipidemia    • Hypertension    • Kidney stone 2007   • Leukopenia       No Known Allergies   Social History     Socioeconomic History   • Marital status:      Spouse name: Kimberly   • Number of children: Not on file   • Years of education: College   • Highest education level: Not on file   Occupational History     Employer: RETIRED   Tobacco Use   • Smoking status: Never Smoker   • Smokeless tobacco: Never Used   Substance and Sexual Activity   • Alcohol use: Yes     Alcohol/week: 4.0 standard drinks     Types: 4 Cans of beer per week     Comment: \"4 beers on weekends\"    • Drug use: No   • Sexual activity: Defer        Current Outpatient Medications:   •  Cholecalciferol (VITAMIN D3) 2000 UNITS capsule, Take 2,000 Units by mouth Daily., Disp: , Rfl:   •  Cyanocobalamin (B-12 PO), Take 1,000 mcg by mouth 1 (One) Time Per Week., Disp: , Rfl:   •  Omega-3 Fatty Acids (FISH OIL) 1200 MG capsule capsule, Take 1,200 mg by mouth 2 (Two) Times a Day., Disp: , Rfl:   •  aspirin 81 MG tablet, Take  by mouth Daily., Disp: , Rfl:   •  clonazePAM (KlonoPIN) 0.5 MG tablet, Take 1 tablet by mouth 2 (Two) Times a Day As Needed for Anxiety., Disp: 20 tablet, Rfl: 1  •  escitalopram (Lexapro) 10 MG tablet, Take 1 tablet by mouth Daily., Disp: 30 tablet, Rfl: 5  •  fluticasone (FLONASE) 50 MCG/ACT nasal spray, SHAKE LQ AND U 1 SPR IEN BID FOR 10 DAYS, Disp: , Rfl: 0     Objective     He is here to follow-up on was overwhelming him in March.  He tells me that since I saw him last he did take S-Citalopram for short while before " he discontinued it as he felt he no longer needed it.    He feels that anxiety is not an issue any longer.    He feels well otherwise.    During our visit today, we both for facemasks.       Review of Systems   Constitutional: Negative.    HENT: Negative.    Respiratory: Negative.    Cardiovascular: Negative.    Musculoskeletal: Negative.    Psychiatric/Behavioral: Negative.        Physical Exam   Constitutional: He is oriented to person, place, and time. He appears well-developed and well-nourished.   HENT:   Head: Normocephalic.   Cardiovascular: Normal rate and regular rhythm.   Pulmonary/Chest: Effort normal and breath sounds normal.   Neurological: He is alert and oriented to person, place, and time.   Psychiatric: He has a normal mood and affect. His behavior is normal. Judgment and thought content normal.   Nursing note and vitals reviewed.        Problem List Items Addressed This Visit        Endocrine    Hypogonadism in male       Other    RESOLVED: Depression with anxiety - Primary            PLAN  His depression with anxiety is resolved.    He has hypergonadism.  He has no difficulty regarding his libido or sexual performance.  So obviously we will treat that.    I asked him to follow-up in about 6 months.  He should follow-up for comprehensive physical examination once yearly.  No follow-ups on file.

## 2020-07-12 PROBLEM — F41.8 DEPRESSION WITH ANXIETY: Status: RESOLVED | Noted: 2020-03-24 | Resolved: 2020-07-12

## 2020-11-25 LAB — SARS-COV-2 RNA RESP QL NAA+PROBE: NOT DETECTED

## 2020-12-29 DIAGNOSIS — Z13.228 ENCOUNTER FOR SCREENING FOR METABOLIC DISORDER: ICD-10-CM

## 2020-12-29 DIAGNOSIS — Z12.5 ENCOUNTER FOR SCREENING FOR MALIGNANT NEOPLASM OF PROSTATE: ICD-10-CM

## 2020-12-29 DIAGNOSIS — Z13.220 ENCOUNTER FOR SCREENING FOR LIPID DISORDER: ICD-10-CM

## 2020-12-29 DIAGNOSIS — E29.1 HYPOGONADISM IN MALE: ICD-10-CM

## 2020-12-29 DIAGNOSIS — Z13.1 ENCOUNTER FOR SCREENING FOR DIABETES MELLITUS: ICD-10-CM

## 2020-12-29 DIAGNOSIS — Z13.89 ENCOUNTER FOR SCREENING FOR OTHER DISORDER: Primary | ICD-10-CM

## 2021-01-13 ENCOUNTER — OFFICE VISIT (OUTPATIENT)
Dept: FAMILY MEDICINE CLINIC | Facility: CLINIC | Age: 66
End: 2021-01-13

## 2021-01-13 VITALS
WEIGHT: 171.6 LBS | SYSTOLIC BLOOD PRESSURE: 120 MMHG | DIASTOLIC BLOOD PRESSURE: 70 MMHG | BODY MASS INDEX: 23.24 KG/M2 | OXYGEN SATURATION: 99 % | HEIGHT: 72 IN | RESPIRATION RATE: 18 BRPM | HEART RATE: 82 BPM | TEMPERATURE: 97.3 F

## 2021-01-13 DIAGNOSIS — Z12.11 ENCOUNTER FOR SCREENING FOR COLORECTAL MALIGNANT NEOPLASM: Primary | ICD-10-CM

## 2021-01-13 DIAGNOSIS — Z12.12 ENCOUNTER FOR SCREENING FOR COLORECTAL MALIGNANT NEOPLASM: Primary | ICD-10-CM

## 2021-01-13 DIAGNOSIS — Z00.00 WELL ADULT EXAM: ICD-10-CM

## 2021-01-13 DIAGNOSIS — D72.819 LEUKOPENIA, UNSPECIFIED TYPE: ICD-10-CM

## 2021-01-13 LAB
ALBUMIN SERPL-MCNC: 4.6 G/DL (ref 3.5–5.2)
ALBUMIN/GLOB SERPL: 2.3 G/DL
ALP SERPL-CCNC: 59 U/L (ref 39–117)
ALT SERPL-CCNC: 13 U/L (ref 1–41)
APPEARANCE UR: CLEAR
AST SERPL-CCNC: 20 U/L (ref 1–40)
BACTERIA #/AREA URNS HPF: ABNORMAL /HPF
BASOPHILS # BLD AUTO: 0.04 10*3/MM3 (ref 0–0.2)
BASOPHILS NFR BLD AUTO: 1.3 % (ref 0–1.5)
BILIRUB SERPL-MCNC: 1 MG/DL (ref 0–1.2)
BILIRUB UR QL STRIP: NEGATIVE
BUN SERPL-MCNC: 23 MG/DL (ref 8–23)
BUN/CREAT SERPL: 23.2 (ref 7–25)
CALCIUM SERPL-MCNC: 9.2 MG/DL (ref 8.6–10.5)
CASTS URNS MICRO: ABNORMAL
CHLORIDE SERPL-SCNC: 101 MMOL/L (ref 98–107)
CHOLEST SERPL-MCNC: 243 MG/DL (ref 0–200)
CO2 SERPL-SCNC: 27.4 MMOL/L (ref 22–29)
COLOR UR: YELLOW
CREAT SERPL-MCNC: 0.99 MG/DL (ref 0.76–1.27)
DEVELOPER EXPIRATION DATE: NORMAL
DEVELOPER LOT NUMBER: NORMAL
EOSINOPHIL # BLD AUTO: 0.03 10*3/MM3 (ref 0–0.4)
EOSINOPHIL NFR BLD AUTO: 1 % (ref 0.3–6.2)
EPI CELLS #/AREA URNS HPF: ABNORMAL /HPF
ERYTHROCYTE [DISTWIDTH] IN BLOOD BY AUTOMATED COUNT: 11.8 % (ref 12.3–15.4)
EXPIRATION DATE: NORMAL
FECAL OCCULT BLOOD SCREEN, POC: NEGATIVE
GLOBULIN SER CALC-MCNC: 2 GM/DL
GLUCOSE SERPL-MCNC: 86 MG/DL (ref 65–99)
GLUCOSE UR QL: NEGATIVE
HBA1C MFR BLD: 4.6 % (ref 4.8–5.6)
HCT VFR BLD AUTO: 44.7 % (ref 37.5–51)
HDLC SERPL-MCNC: 80 MG/DL (ref 40–60)
HGB BLD-MCNC: 15.6 G/DL (ref 13–17.7)
HGB UR QL STRIP: ABNORMAL
IMM GRANULOCYTES # BLD AUTO: 0.01 10*3/MM3 (ref 0–0.05)
IMM GRANULOCYTES NFR BLD AUTO: 0.3 % (ref 0–0.5)
KETONES UR QL STRIP: NEGATIVE
LDLC SERPL CALC-MCNC: 152 MG/DL (ref 0–100)
LDLC/HDLC SERPL: 1.88 {RATIO}
LEUKOCYTE ESTERASE UR QL STRIP: NEGATIVE
LYMPHOCYTES # BLD AUTO: 0.97 10*3/MM3 (ref 0.7–3.1)
LYMPHOCYTES NFR BLD AUTO: 31.5 % (ref 19.6–45.3)
Lab: NORMAL
MCH RBC QN AUTO: 33.6 PG (ref 26.6–33)
MCHC RBC AUTO-ENTMCNC: 34.9 G/DL (ref 31.5–35.7)
MCV RBC AUTO: 96.3 FL (ref 79–97)
MONOCYTES # BLD AUTO: 0.34 10*3/MM3 (ref 0.1–0.9)
MONOCYTES NFR BLD AUTO: 11 % (ref 5–12)
NEGATIVE CONTROL: NEGATIVE
NEUTROPHILS # BLD AUTO: 1.69 10*3/MM3 (ref 1.7–7)
NEUTROPHILS NFR BLD AUTO: 54.9 % (ref 42.7–76)
NITRITE UR QL STRIP: NEGATIVE
NRBC BLD AUTO-RTO: 0 /100 WBC (ref 0–0.2)
PH UR STRIP: 5.5 [PH] (ref 5–8)
PLATELET # BLD AUTO: 139 10*3/MM3 (ref 140–450)
POSITIVE CONTROL: POSITIVE
POTASSIUM SERPL-SCNC: 5.1 MMOL/L (ref 3.5–5.2)
PROT SERPL-MCNC: 6.6 G/DL (ref 6–8.5)
PROT UR QL STRIP: NEGATIVE
PSA SERPL-MCNC: 0.85 NG/ML (ref 0–4)
RBC # BLD AUTO: 4.64 10*6/MM3 (ref 4.14–5.8)
RBC #/AREA URNS HPF: ABNORMAL /HPF
SODIUM SERPL-SCNC: 139 MMOL/L (ref 136–145)
SP GR UR: 1.02 (ref 1–1.03)
TESTOST FREE SERPL-MCNC: 5.9 PG/ML (ref 6.6–18.1)
TESTOST SERPL-MCNC: 625.5 NG/DL (ref 264–916)
TRIGL SERPL-MCNC: 64 MG/DL (ref 0–150)
TSH SERPL DL<=0.005 MIU/L-ACNC: 2.93 UIU/ML (ref 0.27–4.2)
UROBILINOGEN UR STRIP-MCNC: ABNORMAL MG/DL
VLDLC SERPL CALC-MCNC: 11 MG/DL (ref 5–40)
WBC # BLD AUTO: 3.08 10*3/MM3 (ref 3.4–10.8)
WBC #/AREA URNS HPF: ABNORMAL /HPF

## 2021-01-13 PROCEDURE — 99397 PER PM REEVAL EST PAT 65+ YR: CPT | Performed by: INTERNAL MEDICINE

## 2021-01-13 PROCEDURE — 82274 ASSAY TEST FOR BLOOD FECAL: CPT | Performed by: INTERNAL MEDICINE

## 2021-01-13 NOTE — PROGRESS NOTES
"Subjective   Terrance Gresham is a 65 y.o. male. Patient is here today for   Chief Complaint   Patient presents with   • Annual Exam          Vitals:    01/13/21 1048   BP: 120/70   Pulse: 82   Resp: 18   Temp: 97.3 °F (36.3 °C)   SpO2: 99%     Body mass index is 23.27 kg/m².    The following portions of the patient's history were reviewed and updated as appropriate: allergies, current medications, past family history, past medical history, past social history, past surgical history and problem list.    Past Medical History:   Diagnosis Date   • Disc disorder     Bulging   • Enlarged prostate    • Hx of colonic polyp    • Hyperlipidemia    • Hypertension    • Kidney stone 2007   • Leukopenia       No Known Allergies   Social History     Socioeconomic History   • Marital status:      Spouse name: Kimberly   • Number of children: Not on file   • Years of education: College   • Highest education level: Not on file   Occupational History     Employer: RETIRED   Tobacco Use   • Smoking status: Never Smoker   • Smokeless tobacco: Never Used   Substance and Sexual Activity   • Alcohol use: Yes     Alcohol/week: 4.0 standard drinks     Types: 4 Cans of beer per week     Comment: \"4 beers on weekends\"    • Drug use: No   • Sexual activity: Defer        Current Outpatient Medications:   •  Cholecalciferol (VITAMIN D3) 2000 UNITS capsule, Take 2,000 Units by mouth Daily., Disp: , Rfl:   •  Cyanocobalamin (B-12 PO), Take 1,000 mcg by mouth 1 (One) Time Per Week., Disp: , Rfl:   •  Omega-3 Fatty Acids (FISH OIL) 1200 MG capsule capsule, Take 1,200 mg by mouth 2 (Two) Times a Day., Disp: , Rfl:      EKG  ECG Report  Neither electrocardiogram nor a PA and lateral chest x-ray were done today.  They were not indicated.  Objective   Here today for a comprehensive physical exam.    He has no complaints.     Terrance Gresham 65 y.o. male who presents for an Annual Wellness Visit.  he has a history of   Patient Active Problem List "   Diagnosis   • Hypogonadism in male   • Gastroesophageal reflux disease   • Paresthesia of lower extremity   • Thoracic back pain   • Erectile dysfunction of nonorganic origin   • Well adult exam   • Chest pain, atypical   • Leukopenia   • History of colon polyps   .  he has been doing well with new interval problems.  Labs results discussed in detail with the patient.  Plan to update vaccines if needed today.      Lab Results (most recent)     Procedure Component Value Units Date/Time    POCT occult blood x 1 stool [[id number]]  (Normal) Collected: 01/13/21 1300    Specimen: Stool Updated: 01/13/21 1301     Fecal Occult Blood Negative     Lot Number [id number]     Expiration Date 05/31/2022     DEVELOPER LOT NUMBER [id number]     DEVELOPER EXPIRATION DATE 05/31/2022     Positive Control Positive     Negative Control Negative            Review of Systems   Constitutional: Negative.    HENT: Negative.    Eyes: Negative.    Respiratory: Negative.    Cardiovascular: Negative.    Gastrointestinal: Negative.    Endocrine: Negative.    Genitourinary: Negative.    Musculoskeletal: Negative.    Allergic/Immunologic: Negative.    Neurological: Negative.    Hematological: Negative.    Psychiatric/Behavioral: Negative.        Physical Exam  Vitals signs and nursing note reviewed.   Constitutional:       Appearance: Normal appearance. He is normal weight. He is not ill-appearing or diaphoretic.      Comments: Pleasant, neatly groomed, in no distress.   HENT:      Head: Normocephalic and atraumatic.      Right Ear: Tympanic membrane, ear canal and external ear normal. There is no impacted cerumen.      Left Ear: Tympanic membrane, ear canal and external ear normal. There is no impacted cerumen.   Eyes:      General: No scleral icterus.        Right eye: No discharge.         Left eye: No discharge.      Extraocular Movements: Extraocular movements intact.      Conjunctiva/sclera: Conjunctivae normal.   Neck:      Musculoskeletal:  Normal range of motion and neck supple. No neck rigidity or muscular tenderness.      Vascular: No carotid bruit.   Cardiovascular:      Rate and Rhythm: Normal rate and regular rhythm.      Pulses: Normal pulses.      Heart sounds: Normal heart sounds. No murmur. No friction rub. No gallop.    Pulmonary:      Effort: Pulmonary effort is normal. No respiratory distress.      Breath sounds: Normal breath sounds. No stridor. No wheezing, rhonchi or rales.   Chest:      Chest wall: No tenderness.   Abdominal:      General: Abdomen is flat. There is no distension.      Palpations: Abdomen is soft. There is no mass.      Tenderness: There is no abdominal tenderness. There is no right CVA tenderness, left CVA tenderness, guarding or rebound.      Hernia: No hernia is present.   Genitourinary:     Penis: Normal.       Scrotum/Testes: Normal.      Prostate: Normal.      Rectum: Normal. Guaiac result negative.   Musculoskeletal: Normal range of motion.         General: No swelling, tenderness, deformity or signs of injury.      Right lower leg: No edema.      Left lower leg: No edema.   Lymphadenopathy:      Cervical: No cervical adenopathy.   Skin:     General: Skin is warm and dry.      Capillary Refill: Capillary refill takes less than 2 seconds.      Coloration: Skin is not jaundiced or pale.      Findings: No bruising, erythema, lesion or rash.   Neurological:      General: No focal deficit present.      Mental Status: He is alert and oriented to person, place, and time.      Cranial Nerves: No cranial nerve deficit.      Sensory: No sensory deficit.      Motor: No weakness.      Coordination: Coordination normal.      Gait: Gait normal.      Deep Tendon Reflexes: Reflexes normal.   Psychiatric:         Mood and Affect: Mood normal.         Behavior: Behavior normal.         Thought Content: Thought content normal.         Judgment: Judgment normal.         ASSESSMENT       Problems Addressed this Visit        Health  Encounters    Well adult exam       Hematology and Neoplasia    Leukopenia      Other Visit Diagnoses     Encounter for screening for colorectal malignant neoplasm    -  Primary    Relevant Orders    POCT occult blood x 1 stool (Completed)      Diagnoses       Codes Comments    Encounter for screening for colorectal malignant neoplasm    -  Primary ICD-10-CM: Z12.11, Z12.12  ICD-9-CM: V76.51, V76.41     Well adult exam     ICD-10-CM: Z00.00  ICD-9-CM: V70.0     Leukopenia, unspecified type     ICD-10-CM: D72.819  ICD-9-CM: 288.50           PLAN  He has a mild leukopenia.  He follows up with hematology couple years ago regarding this issue and no concerning etiology was discovered.  His leukopenia remains mild visit was in the past there is no change.    He is a very fit 65-year-old man.  Very retired is obese and now he enjoys a healthy weight.  He gets regular aerobic activity.    He has some microscopic hematuria but this is attributable to his renal stones.  These are not giving him any trouble for the time being.    I asked him to follow-up in about 1 year.  There are no Patient Instructions on file for this visit.    No follow-ups on file.

## 2021-03-19 ENCOUNTER — BULK ORDERING (OUTPATIENT)
Dept: CASE MANAGEMENT | Facility: OTHER | Age: 66
End: 2021-03-19

## 2021-03-19 DIAGNOSIS — Z23 IMMUNIZATION DUE: ICD-10-CM

## 2021-07-22 DIAGNOSIS — E29.1 HYPOGONADISM IN MALE: Primary | ICD-10-CM

## 2021-07-30 ENCOUNTER — OFFICE VISIT (OUTPATIENT)
Dept: FAMILY MEDICINE CLINIC | Facility: CLINIC | Age: 66
End: 2021-07-30

## 2021-07-30 VITALS
WEIGHT: 174 LBS | DIASTOLIC BLOOD PRESSURE: 62 MMHG | BODY MASS INDEX: 23.57 KG/M2 | HEIGHT: 72 IN | SYSTOLIC BLOOD PRESSURE: 116 MMHG | OXYGEN SATURATION: 100 % | RESPIRATION RATE: 18 BRPM | TEMPERATURE: 96.2 F | HEART RATE: 81 BPM

## 2021-07-30 DIAGNOSIS — D72.819 LEUKOPENIA, UNSPECIFIED TYPE: Primary | ICD-10-CM

## 2021-07-30 PROCEDURE — 90732 PPSV23 VACC 2 YRS+ SUBQ/IM: CPT | Performed by: INTERNAL MEDICINE

## 2021-07-30 PROCEDURE — G0009 ADMIN PNEUMOCOCCAL VACCINE: HCPCS | Performed by: INTERNAL MEDICINE

## 2021-07-30 PROCEDURE — 99214 OFFICE O/P EST MOD 30 MIN: CPT | Performed by: INTERNAL MEDICINE

## 2021-07-30 NOTE — PROGRESS NOTES
"Acacia Gresham is a 65 y.o. male. Patient is here today for   Chief Complaint   Patient presents with   • Follow-up     lab follow up           Vitals:    07/30/21 1009   BP: 116/62   Pulse: 81   Resp: 18   Temp: 96.2 °F (35.7 °C)   SpO2: 100%     Body mass index is 23.59 kg/m².      Past Medical History:   Diagnosis Date   • Disc disorder     Bulging   • Enlarged prostate    • Hx of colonic polyp    • Hyperlipidemia    • Hypertension    • Kidney stone 2007   • Leukopenia       No Known Allergies   Social History     Socioeconomic History   • Marital status:      Spouse name: Kimberly   • Number of children: Not on file   • Years of education: College   • Highest education level: Not on file   Tobacco Use   • Smoking status: Never Smoker   • Smokeless tobacco: Never Used   Vaping Use   • Vaping Use: Never used   Substance and Sexual Activity   • Alcohol use: Yes     Alcohol/week: 4.0 standard drinks     Types: 4 Cans of beer per week     Comment: \"4 beers on weekends\"    • Drug use: No   • Sexual activity: Defer        Current Outpatient Medications:   •  Cholecalciferol (VITAMIN D3) 2000 UNITS capsule, Take 2,000 Units by mouth Daily., Disp: , Rfl:   •  Cyanocobalamin (B-12 PO), Take 1,000 mcg by mouth 1 (One) Time Per Week., Disp: , Rfl:   •  Omega-3 Fatty Acids (FISH OIL) 1200 MG capsule capsule, Take 1,200 mg by mouth 2 (Two) Times a Day., Disp: , Rfl:      Objective     Here today to follow-up on labs.    He has no complaints.       Review of Systems   Constitutional: Negative.    HENT: Negative.    Respiratory: Negative.    Cardiovascular: Negative.    Musculoskeletal: Negative.    Psychiatric/Behavioral: Negative.        Physical Exam  Vitals and nursing note reviewed.   Constitutional:       Comments: Pleasant, neatly groomed, no distress.   Cardiovascular:      Rate and Rhythm: Regular rhythm.      Heart sounds: Normal heart sounds. No murmur heard.   No gallop.    Pulmonary:      Breath " sounds: Normal breath sounds. No wheezing or rales.   Neurological:      Mental Status: He is alert and oriented to person, place, and time.   Psychiatric:         Mood and Affect: Mood normal.         Behavior: Behavior normal.         Thought Content: Thought content normal.           Problems Addressed this Visit        Hematology and Neoplasia    Leukopenia - Primary      Diagnoses       Codes Comments    Leukopenia, unspecified type    -  Primary ICD-10-CM: D72.819  ICD-9-CM: 288.50             PLAN    He used to be obese. When he was obese he had hypertension which is resolved with regular aerobic exercise and maintaining appropriate weight.    He has a history of mild leukopenia and mild thrombocytopenia. He has been follow-up with oncology up until about a year ago. He was asked to come back if his number should take a turn for the worst.    He is at baseline for leukopenia and thrombocytopenia for the time being and has been for several years.    I like to have him back in about 6 months. He should have a comprehensive physical exam or Medicare wellness visit once yearly.    At his follow-up appointment, I would recommend the following labs: PSA, comprehensive metabolic panel, CBC, urinalysis,  No follow-ups on file.

## 2022-01-10 DIAGNOSIS — Z00.00 WELL ADULT EXAM: Primary | ICD-10-CM

## 2022-01-10 DIAGNOSIS — Z00.00 ROUTINE GENERAL MEDICAL EXAMINATION AT A HEALTH CARE FACILITY: ICD-10-CM

## 2022-01-10 DIAGNOSIS — Z12.5 SPECIAL SCREENING FOR MALIGNANT NEOPLASM OF PROSTATE: ICD-10-CM

## 2022-01-10 DIAGNOSIS — E29.1 HYPOGONADISM IN MALE: ICD-10-CM

## 2022-01-13 LAB
ALBUMIN SERPL-MCNC: 4.5 G/DL (ref 3.8–4.8)
ALBUMIN/GLOB SERPL: 2.6 {RATIO} (ref 1.2–2.2)
ALP SERPL-CCNC: 53 IU/L (ref 44–121)
ALT SERPL-CCNC: 11 IU/L (ref 0–44)
AST SERPL-CCNC: 16 IU/L (ref 0–40)
BASOPHILS # BLD AUTO: 0 X10E3/UL (ref 0–0.2)
BASOPHILS NFR BLD AUTO: 1 %
BILIRUB SERPL-MCNC: 1.5 MG/DL (ref 0–1.2)
BUN SERPL-MCNC: 19 MG/DL (ref 8–27)
BUN/CREAT SERPL: 20 (ref 10–24)
CALCIUM SERPL-MCNC: 9.2 MG/DL (ref 8.6–10.2)
CHLORIDE SERPL-SCNC: 101 MMOL/L (ref 96–106)
CHOLEST SERPL-MCNC: 240 MG/DL (ref 100–199)
CO2 SERPL-SCNC: 25 MMOL/L (ref 20–29)
CREAT SERPL-MCNC: 0.97 MG/DL (ref 0.76–1.27)
EOSINOPHIL # BLD AUTO: 0 X10E3/UL (ref 0–0.4)
EOSINOPHIL NFR BLD AUTO: 1 %
ERYTHROCYTE [DISTWIDTH] IN BLOOD BY AUTOMATED COUNT: 11.6 % (ref 11.6–15.4)
GLOBULIN SER CALC-MCNC: 1.7 G/DL (ref 1.5–4.5)
GLUCOSE SERPL-MCNC: 84 MG/DL (ref 65–99)
HCT VFR BLD AUTO: 46.7 % (ref 37.5–51)
HDLC SERPL-MCNC: 76 MG/DL
HGB BLD-MCNC: 15.5 G/DL (ref 13–17.7)
IMM GRANULOCYTES # BLD AUTO: 0 X10E3/UL (ref 0–0.1)
IMM GRANULOCYTES NFR BLD AUTO: 0 %
LDLC SERPL CALC-MCNC: 150 MG/DL (ref 0–99)
LDLC/HDLC SERPL: 2 RATIO (ref 0–3.6)
LYMPHOCYTES # BLD AUTO: 1 X10E3/UL (ref 0.7–3.1)
LYMPHOCYTES NFR BLD AUTO: 33 %
MCH RBC QN AUTO: 31.6 PG (ref 26.6–33)
MCHC RBC AUTO-ENTMCNC: 33.2 G/DL (ref 31.5–35.7)
MCV RBC AUTO: 95 FL (ref 79–97)
MONOCYTES # BLD AUTO: 0.3 X10E3/UL (ref 0.1–0.9)
MONOCYTES NFR BLD AUTO: 10 %
NEUTROPHILS # BLD AUTO: 1.7 X10E3/UL (ref 1.4–7)
NEUTROPHILS NFR BLD AUTO: 55 %
PLATELET # BLD AUTO: 140 X10E3/UL (ref 150–450)
POTASSIUM SERPL-SCNC: 4.4 MMOL/L (ref 3.5–5.2)
PROT SERPL-MCNC: 6.2 G/DL (ref 6–8.5)
PSA SERPL-MCNC: 1 NG/ML (ref 0–4)
RBC # BLD AUTO: 4.9 X10E6/UL (ref 4.14–5.8)
SODIUM SERPL-SCNC: 138 MMOL/L (ref 134–144)
TRIGL SERPL-MCNC: 84 MG/DL (ref 0–149)
TSH SERPL DL<=0.005 MIU/L-ACNC: 2.95 UIU/ML (ref 0.45–4.5)
UNABLE TO VOID: NORMAL
VLDLC SERPL CALC-MCNC: 14 MG/DL (ref 5–40)
WBC # BLD AUTO: 3.2 X10E3/UL (ref 3.4–10.8)

## 2022-01-19 ENCOUNTER — OFFICE VISIT (OUTPATIENT)
Dept: FAMILY MEDICINE CLINIC | Facility: CLINIC | Age: 67
End: 2022-01-19

## 2022-01-19 VITALS
SYSTOLIC BLOOD PRESSURE: 126 MMHG | HEIGHT: 72 IN | TEMPERATURE: 96 F | OXYGEN SATURATION: 97 % | RESPIRATION RATE: 18 BRPM | HEART RATE: 74 BPM | DIASTOLIC BLOOD PRESSURE: 72 MMHG | WEIGHT: 176.4 LBS | BODY MASS INDEX: 23.89 KG/M2

## 2022-01-19 DIAGNOSIS — Z00.00 MEDICARE ANNUAL WELLNESS VISIT, SUBSEQUENT: Primary | ICD-10-CM

## 2022-01-19 PROCEDURE — 1159F MED LIST DOCD IN RCRD: CPT | Performed by: INTERNAL MEDICINE

## 2022-01-19 PROCEDURE — 1170F FXNL STATUS ASSESSED: CPT | Performed by: INTERNAL MEDICINE

## 2022-01-19 PROCEDURE — G0439 PPPS, SUBSEQ VISIT: HCPCS | Performed by: INTERNAL MEDICINE

## 2022-01-19 NOTE — PROGRESS NOTES
The ABCs of the Annual Wellness Visit  Subsequent Medicare Wellness Visit    Chief Complaint   Patient presents with   • Medicare Wellness-subsequent     wellness       Subjective    History of Present Illness:  Terrance Gresham is a 66 y.o. male who presents for a Subsequent Medicare Wellness Visit.    The following portions of the patient's history were reviewed and   updated as appropriate: allergies, current medications, past family history, past medical history, past social history, past surgical history and problem list.    Compared to one year ago, the patient feels his physical   health is the same.    Compared to one year ago, the patient feels his mental   health is the same.    Recent Hospitalizations:  He was not admitted to the hospital during the last year.       Current Medical Providers:  Patient Care Team:  Moose Chase MD as PCP - General  Moose Chase MD as PCP - Family Medicine  Yaakov Hernandez MD as Consulting Physician (Hematology and Oncology)  Moose Chase MD as Referring Physician (Internal Medicine)    Outpatient Medications Prior to Visit   Medication Sig Dispense Refill   • Cholecalciferol (VITAMIN D3) 2000 UNITS capsule Take 2,000 Units by mouth Daily.     • Cyanocobalamin (B-12 PO) Take 1,000 mcg by mouth 1 (One) Time Per Week.     • Omega-3 Fatty Acids (FISH OIL) 1200 MG capsule capsule Take 1,200 mg by mouth 2 (Two) Times a Day.       No facility-administered medications prior to visit.       No opioid medication identified on active medication list. I have reviewed chart for other potential  high risk medication/s and harmful drug interactions in the elderly.          Aspirin is not on active medication list.  Aspirin use is not indicated based on review of current medical condition/s. Risk of harm outweighs potential benefits.  .    Patient Active Problem List   Diagnosis   • Hypogonadism in male   • Gastroesophageal reflux disease   • Paresthesia of lower  "extremity   • Thoracic back pain   • Erectile dysfunction of nonorganic origin   • Well adult exam   • Chest pain, atypical   • Leukopenia   • History of colon polyps     Advance Care Planning  Advance Directive is not on file.  ACP discussion was held with the patient during this visit. Patient does not have an advance directive, information provided.    Review of Systems   Constitutional: Negative.    HENT: Negative.    Eyes: Negative.    Respiratory: Negative.    Cardiovascular: Negative.    Gastrointestinal: Negative.    Endocrine: Negative.    Genitourinary: Negative.    Musculoskeletal: Negative.    Skin: Negative.    Allergic/Immunologic: Negative.    Neurological: Negative.    Hematological: Negative.    Psychiatric/Behavioral: Negative.         Objective    Vitals:    01/19/22 1100   BP: 126/72   Pulse: 74   Resp: 18   Temp: 96 °F (35.6 °C)   TempSrc: Temporal   SpO2: 97%   Weight: 80 kg (176 lb 6.4 oz)   Height: 182.9 cm (72.01\")     BMI Readings from Last 1 Encounters:   01/19/22 23.92 kg/m²   BMI is within normal parameters. No follow-up required.    Does the patient have evidence of cognitive impairment? No    Physical Exam  Vitals and nursing note reviewed.   Constitutional:       Appearance: Normal appearance. He is normal weight. He is not ill-appearing or diaphoretic.      Comments: Pleasant, neatly groomed, in no distress.   HENT:      Head: Normocephalic.   Neck:      Vascular: No carotid bruit.   Cardiovascular:      Heart sounds: Normal heart sounds.   Pulmonary:      Effort: No respiratory distress.      Breath sounds: Normal breath sounds. No wheezing or rales.   Neurological:      Mental Status: He is alert and oriented to person, place, and time.   Psychiatric:         Mood and Affect: Mood normal.         Thought Content: Thought content normal.       Lab Results   Component Value Date    CHLPL 240 (H) 01/12/2022    TRIG 84 01/12/2022    HDL 76 01/12/2022     (H) 01/12/2022    VLDL " "14 01/12/2022            HEALTH RISK ASSESSMENT    Smoking Status:  Social History     Tobacco Use   Smoking Status Never Smoker   Smokeless Tobacco Never Used     Alcohol Consumption:  Social History     Substance and Sexual Activity   Alcohol Use Yes   • Alcohol/week: 4.0 standard drinks   • Types: 4 Cans of beer per week    Comment: \"4 beers on weekends\"      Fall Risk Screen:    GENNARO Fall Risk Assessment was completed, and patient is at LOW risk for falls.Assessment completed on:1/19/2022    Depression Screening:  PHQ-2/PHQ-9 Depression Screening 1/19/2022   Little interest or pleasure in doing things 0   Feeling down, depressed, or hopeless 0   Trouble falling or staying asleep, or sleeping too much -   Feeling tired or having little energy -   Poor appetite or overeating -   Feeling bad about yourself - or that you are a failure or have let yourself or your family down -   Trouble concentrating on things, such as reading the newspaper or watching television -   Moving or speaking so slowly that other people could have noticed. Or the opposite - being so fidgety or restless that you have been moving around a lot more than usual -   Thoughts that you would be better off dead, or of hurting yourself in some way -   Total Score 0   If you checked off any problems, how difficult have these problems made it for you to do your work, take care of things at home, or get along with other people? -       Health Habits and Functional and Cognitive Screening:  Functional & Cognitive Status 1/19/2022   Do you have difficulty preparing food and eating? No   Do you have difficulty bathing yourself, getting dressed or grooming yourself? No   Do you have difficulty using the toilet? No   Do you have difficulty moving around from place to place? No   Do you have trouble with steps or getting out of a bed or a chair? No   Current Diet Unhealthy Diet   Dental Exam Up to date   Eye Exam Up to date   Exercise (times per week) 7 " times per week   Current Exercises Include Stationary Bicycling/Spin Class   Do you need help using the phone?  No   Are you deaf or do you have serious difficulty hearing?  No   Do you need help with transportation? No   Do you need help shopping? No   Do you need help preparing meals?  No   Do you need help with housework?  No   Do you need help with laundry? No   Do you need help taking your medications? No   Do you need help managing money? No   Do you ever drive or ride in a car without wearing a seat belt? No   Have you felt unusual stress, anger or loneliness in the last month? No   Who do you live with? Spouse   If you need help, do you have trouble finding someone available to you? No   Have you been bothered in the last four weeks by sexual problems? No   Do you have difficulty concentrating, remembering or making decisions? No       Age-appropriate Screening Schedule:  Refer to the list below for future screening recommendations based on patient's age, sex and/or medical conditions. Orders for these recommended tests are listed in the plan section. The patient has been provided with a written plan.    Health Maintenance   Topic Date Due   • ZOSTER VACCINE (1 of 2) Never done   • TDAP/TD VACCINES (2 - Td or Tdap) 12/01/2021   • LIPID PANEL  01/12/2023   • INFLUENZA VACCINE  Completed              Assessment/Plan   CMS Preventative Services Quick Reference  Risk Factors Identified During Encounter  None Identified  The above risks/problems have been discussed with the patient.  Follow up actions/plans if indicated are seen below in the Assessment/Plan Section.  Pertinent information has been shared with the patient in the After Visit Summary.    There are no diagnoses linked to this encounter.    Follow Up:   No follow-ups on file.     An After Visit Summary and PPPS were made available to the patient.    He appears to be in excellent health.    He inquired about getting a comprehensive physical exam.  I  asked him to schedule and I be glad to see him back then.

## 2022-04-29 ENCOUNTER — APPOINTMENT (OUTPATIENT)
Dept: GENERAL RADIOLOGY | Facility: HOSPITAL | Age: 67
End: 2022-04-29

## 2022-04-29 ENCOUNTER — HOSPITAL ENCOUNTER (INPATIENT)
Facility: HOSPITAL | Age: 67
LOS: 4 days | Discharge: HOME | End: 2022-05-03
Attending: EMERGENCY MEDICINE | Admitting: INTERNAL MEDICINE

## 2022-04-29 ENCOUNTER — APPOINTMENT (OUTPATIENT)
Dept: CT IMAGING | Facility: HOSPITAL | Age: 67
End: 2022-04-29

## 2022-04-29 DIAGNOSIS — J18.9 COMMUNITY ACQUIRED PNEUMONIA, UNSPECIFIED LATERALITY: Primary | ICD-10-CM

## 2022-04-29 DIAGNOSIS — J10.1 INFLUENZA A: ICD-10-CM

## 2022-04-29 LAB
ALBUMIN SERPL-MCNC: 3.1 G/DL (ref 3.5–5.2)
ALBUMIN/GLOB SERPL: 0.9 G/DL
ALP SERPL-CCNC: 55 U/L (ref 39–117)
ALT SERPL W P-5'-P-CCNC: 12 U/L (ref 1–41)
ANION GAP SERPL CALCULATED.3IONS-SCNC: 12.8 MMOL/L (ref 5–15)
AST SERPL-CCNC: 18 U/L (ref 1–40)
B PARAPERT DNA SPEC QL NAA+PROBE: NOT DETECTED
B PERT DNA SPEC QL NAA+PROBE: NOT DETECTED
BASOPHILS # BLD MANUAL: 0.05 10*3/MM3 (ref 0–0.2)
BASOPHILS NFR BLD MANUAL: 1 % (ref 0–1.5)
BILIRUB SERPL-MCNC: 1.2 MG/DL (ref 0–1.2)
BUN SERPL-MCNC: 22 MG/DL (ref 8–23)
BUN/CREAT SERPL: 22.2 (ref 7–25)
C PNEUM DNA NPH QL NAA+NON-PROBE: NOT DETECTED
CALCIUM SPEC-SCNC: 9.3 MG/DL (ref 8.6–10.5)
CHLORIDE SERPL-SCNC: 97 MMOL/L (ref 98–107)
CO2 SERPL-SCNC: 24.2 MMOL/L (ref 22–29)
CREAT SERPL-MCNC: 0.99 MG/DL (ref 0.76–1.27)
D DIMER PPP FEU-MCNC: 0.74 MCGFEU/ML (ref 0–0.49)
D-LACTATE SERPL-SCNC: 1.2 MMOL/L (ref 0.5–2)
DEPRECATED RDW RBC AUTO: 39.2 FL (ref 37–54)
EGFRCR SERPLBLD CKD-EPI 2021: 83 ML/MIN/1.73
ERYTHROCYTE [DISTWIDTH] IN BLOOD BY AUTOMATED COUNT: 11.7 % (ref 12.3–15.4)
FLUAV H3 RNA NPH QL NAA+PROBE: DETECTED
FLUBV RNA ISLT QL NAA+PROBE: NOT DETECTED
GLOBULIN UR ELPH-MCNC: 3.5 GM/DL
GLUCOSE SERPL-MCNC: 148 MG/DL (ref 65–99)
HADV DNA SPEC NAA+PROBE: NOT DETECTED
HCOV 229E RNA SPEC QL NAA+PROBE: NOT DETECTED
HCOV HKU1 RNA SPEC QL NAA+PROBE: NOT DETECTED
HCOV NL63 RNA SPEC QL NAA+PROBE: NOT DETECTED
HCOV OC43 RNA SPEC QL NAA+PROBE: NOT DETECTED
HCT VFR BLD AUTO: 41.8 % (ref 37.5–51)
HGB BLD-MCNC: 14.7 G/DL (ref 13–17.7)
HMPV RNA NPH QL NAA+NON-PROBE: NOT DETECTED
HPIV1 RNA ISLT QL NAA+PROBE: NOT DETECTED
HPIV2 RNA SPEC QL NAA+PROBE: NOT DETECTED
HPIV3 RNA NPH QL NAA+PROBE: NOT DETECTED
HPIV4 P GENE NPH QL NAA+PROBE: NOT DETECTED
LYMPHOCYTES # BLD MANUAL: 0 10*3/MM3 (ref 0.7–3.1)
LYMPHOCYTES NFR BLD MANUAL: 1 % (ref 5–12)
M PNEUMO IGG SER IA-ACNC: NOT DETECTED
MCH RBC QN AUTO: 32.3 PG (ref 26.6–33)
MCHC RBC AUTO-ENTMCNC: 35.2 G/DL (ref 31.5–35.7)
MCV RBC AUTO: 91.9 FL (ref 79–97)
MONOCYTES # BLD: 0.05 10*3/MM3 (ref 0.1–0.9)
NEUTROPHILS # BLD AUTO: 5.35 10*3/MM3 (ref 1.7–7)
NEUTROPHILS NFR BLD MANUAL: 98 % (ref 42.7–76)
NT-PROBNP SERPL-MCNC: 334 PG/ML (ref 0–900)
PLAT MORPH BLD: NORMAL
PLATELET # BLD AUTO: 148 10*3/MM3 (ref 140–450)
PMV BLD AUTO: 9.3 FL (ref 6–12)
POTASSIUM SERPL-SCNC: 4 MMOL/L (ref 3.5–5.2)
PROT SERPL-MCNC: 6.6 G/DL (ref 6–8.5)
QT INTERVAL: 376 MS
RBC # BLD AUTO: 4.55 10*6/MM3 (ref 4.14–5.8)
RBC MORPH BLD: NORMAL
RHINOVIRUS RNA SPEC NAA+PROBE: NOT DETECTED
RSV RNA NPH QL NAA+NON-PROBE: NOT DETECTED
SARS-COV-2 RNA NPH QL NAA+NON-PROBE: NOT DETECTED
SODIUM SERPL-SCNC: 134 MMOL/L (ref 136–145)
TROPONIN T SERPL-MCNC: <0.01 NG/ML (ref 0–0.03)
VARIANT LYMPHS NFR BLD MANUAL: 0 % (ref 19.6–45.3)
WBC MORPH BLD: NORMAL
WBC NRBC COR # BLD: 5.46 10*3/MM3 (ref 3.4–10.8)

## 2022-04-29 PROCEDURE — 85379 FIBRIN DEGRADATION QUANT: CPT | Performed by: EMERGENCY MEDICINE

## 2022-04-29 PROCEDURE — 87077 CULTURE AEROBIC IDENTIFY: CPT | Performed by: EMERGENCY MEDICINE

## 2022-04-29 PROCEDURE — 84484 ASSAY OF TROPONIN QUANT: CPT | Performed by: EMERGENCY MEDICINE

## 2022-04-29 PROCEDURE — 25010000002 CEFTRIAXONE PER 250 MG: Performed by: EMERGENCY MEDICINE

## 2022-04-29 PROCEDURE — 71275 CT ANGIOGRAPHY CHEST: CPT

## 2022-04-29 PROCEDURE — 0202U NFCT DS 22 TRGT SARS-COV-2: CPT | Performed by: EMERGENCY MEDICINE

## 2022-04-29 PROCEDURE — 93010 ELECTROCARDIOGRAM REPORT: CPT | Performed by: INTERNAL MEDICINE

## 2022-04-29 PROCEDURE — 83880 ASSAY OF NATRIURETIC PEPTIDE: CPT | Performed by: EMERGENCY MEDICINE

## 2022-04-29 PROCEDURE — 25010000002 ENOXAPARIN PER 10 MG: Performed by: INTERNAL MEDICINE

## 2022-04-29 PROCEDURE — 0 IOPAMIDOL PER 1 ML: Performed by: INTERNAL MEDICINE

## 2022-04-29 PROCEDURE — 71045 X-RAY EXAM CHEST 1 VIEW: CPT

## 2022-04-29 PROCEDURE — 87040 BLOOD CULTURE FOR BACTERIA: CPT | Performed by: EMERGENCY MEDICINE

## 2022-04-29 PROCEDURE — 87186 SC STD MICRODIL/AGAR DIL: CPT | Performed by: EMERGENCY MEDICINE

## 2022-04-29 PROCEDURE — 87150 DNA/RNA AMPLIFIED PROBE: CPT | Performed by: EMERGENCY MEDICINE

## 2022-04-29 PROCEDURE — 80053 COMPREHEN METABOLIC PANEL: CPT | Performed by: EMERGENCY MEDICINE

## 2022-04-29 PROCEDURE — 25010000002 AZITHROMYCIN PER 500 MG: Performed by: EMERGENCY MEDICINE

## 2022-04-29 PROCEDURE — 85007 BL SMEAR W/DIFF WBC COUNT: CPT | Performed by: EMERGENCY MEDICINE

## 2022-04-29 PROCEDURE — 99284 EMERGENCY DEPT VISIT MOD MDM: CPT

## 2022-04-29 PROCEDURE — 99285 EMERGENCY DEPT VISIT HI MDM: CPT

## 2022-04-29 PROCEDURE — 93005 ELECTROCARDIOGRAM TRACING: CPT | Performed by: EMERGENCY MEDICINE

## 2022-04-29 PROCEDURE — 83605 ASSAY OF LACTIC ACID: CPT | Performed by: EMERGENCY MEDICINE

## 2022-04-29 PROCEDURE — 85025 COMPLETE CBC W/AUTO DIFF WBC: CPT | Performed by: EMERGENCY MEDICINE

## 2022-04-29 RX ORDER — SODIUM CHLORIDE 0.9 % (FLUSH) 0.9 %
10 SYRINGE (ML) INJECTION AS NEEDED
Status: DISCONTINUED | OUTPATIENT
Start: 2022-04-29 | End: 2022-05-03 | Stop reason: HOSPADM

## 2022-04-29 RX ORDER — OSELTAMIVIR PHOSPHATE 75 MG/1
75 CAPSULE ORAL 2 TIMES DAILY
COMMUNITY
End: 2022-05-03 | Stop reason: HOSPADM

## 2022-04-29 RX ORDER — ACETAMINOPHEN 500 MG
1000 TABLET ORAL ONCE
Status: COMPLETED | OUTPATIENT
Start: 2022-04-29 | End: 2022-04-29

## 2022-04-29 RX ORDER — UREA 10 %
3 LOTION (ML) TOPICAL NIGHTLY PRN
Status: DISCONTINUED | OUTPATIENT
Start: 2022-04-29 | End: 2022-05-03 | Stop reason: HOSPADM

## 2022-04-29 RX ORDER — OSELTAMIVIR PHOSPHATE 75 MG/1
75 CAPSULE ORAL 2 TIMES DAILY
Status: COMPLETED | OUTPATIENT
Start: 2022-04-29 | End: 2022-05-01

## 2022-04-29 RX ORDER — ENOXAPARIN SODIUM 100 MG/ML
40 INJECTION SUBCUTANEOUS EVERY 24 HOURS
Status: DISCONTINUED | OUTPATIENT
Start: 2022-04-29 | End: 2022-05-03 | Stop reason: HOSPADM

## 2022-04-29 RX ORDER — ACETAMINOPHEN 325 MG/1
650 TABLET ORAL EVERY 4 HOURS PRN
Status: DISCONTINUED | OUTPATIENT
Start: 2022-04-29 | End: 2022-05-03 | Stop reason: HOSPADM

## 2022-04-29 RX ORDER — NAPROXEN 500 MG/1
500 TABLET ORAL 2 TIMES DAILY WITH MEALS
COMMUNITY
End: 2022-05-03 | Stop reason: HOSPADM

## 2022-04-29 RX ORDER — ONDANSETRON 4 MG/1
4 TABLET, FILM COATED ORAL EVERY 6 HOURS PRN
Status: DISCONTINUED | OUTPATIENT
Start: 2022-04-29 | End: 2022-05-03 | Stop reason: HOSPADM

## 2022-04-29 RX ORDER — METHYLPREDNISOLONE 4 MG/1
1 TABLET ORAL 2 TIMES DAILY
COMMUNITY
End: 2022-05-03 | Stop reason: HOSPADM

## 2022-04-29 RX ORDER — ONDANSETRON 2 MG/ML
4 INJECTION INTRAMUSCULAR; INTRAVENOUS EVERY 6 HOURS PRN
Status: DISCONTINUED | OUTPATIENT
Start: 2022-04-29 | End: 2022-05-03 | Stop reason: HOSPADM

## 2022-04-29 RX ORDER — SODIUM CHLORIDE 9 MG/ML
75 INJECTION, SOLUTION INTRAVENOUS CONTINUOUS
Status: DISCONTINUED | OUTPATIENT
Start: 2022-04-29 | End: 2022-05-01

## 2022-04-29 RX ORDER — NITROGLYCERIN 0.4 MG/1
0.4 TABLET SUBLINGUAL
Status: DISCONTINUED | OUTPATIENT
Start: 2022-04-29 | End: 2022-05-03 | Stop reason: HOSPADM

## 2022-04-29 RX ORDER — MELATONIN
2000 DAILY
Status: DISCONTINUED | OUTPATIENT
Start: 2022-04-30 | End: 2022-05-03 | Stop reason: HOSPADM

## 2022-04-29 RX ORDER — CHOLECALCIFEROL (VITAMIN D3) 125 MCG
1000 CAPSULE ORAL WEEKLY
Status: DISCONTINUED | OUTPATIENT
Start: 2022-04-30 | End: 2022-05-03 | Stop reason: HOSPADM

## 2022-04-29 RX ADMIN — CEFTRIAXONE 1 G: 1 INJECTION, POWDER, FOR SOLUTION INTRAMUSCULAR; INTRAVENOUS at 19:30

## 2022-04-29 RX ADMIN — ENOXAPARIN SODIUM 40 MG: 100 INJECTION SUBCUTANEOUS at 21:33

## 2022-04-29 RX ADMIN — SODIUM CHLORIDE 75 ML/HR: 9 INJECTION, SOLUTION INTRAVENOUS at 23:25

## 2022-04-29 RX ADMIN — OSELTAMIVIR PHOSPHATE 75 MG: 75 CAPSULE ORAL at 23:25

## 2022-04-29 RX ADMIN — SODIUM CHLORIDE, POTASSIUM CHLORIDE, SODIUM LACTATE AND CALCIUM CHLORIDE 1000 ML: 600; 310; 30; 20 INJECTION, SOLUTION INTRAVENOUS at 16:33

## 2022-04-29 RX ADMIN — ACETAMINOPHEN 1000 MG: 500 TABLET ORAL at 16:37

## 2022-04-29 RX ADMIN — IOPAMIDOL 95 ML: 755 INJECTION, SOLUTION INTRAVENOUS at 19:06

## 2022-04-29 RX ADMIN — AZITHROMYCIN DIHYDRATE 500 MG: 500 INJECTION, POWDER, LYOPHILIZED, FOR SOLUTION INTRAVENOUS at 20:15

## 2022-04-30 ENCOUNTER — APPOINTMENT (OUTPATIENT)
Dept: CARDIOLOGY | Facility: HOSPITAL | Age: 67
End: 2022-04-30

## 2022-04-30 PROBLEM — J13 PNEUMONIA DUE TO STREPTOCOCCUS PNEUMONIAE (HCC): Status: ACTIVE | Noted: 2022-04-30

## 2022-04-30 PROBLEM — R06.00 DYSPNEA: Status: ACTIVE | Noted: 2022-04-30

## 2022-04-30 PROBLEM — J10.1 INFLUENZA A: Status: ACTIVE | Noted: 2022-04-30

## 2022-04-30 PROBLEM — A40.3 SEPSIS DUE TO STREPTOCOCCUS PNEUMONIAE: Status: ACTIVE | Noted: 2022-04-30

## 2022-04-30 LAB
ANION GAP SERPL CALCULATED.3IONS-SCNC: 10 MMOL/L (ref 5–15)
BACTERIA BLD CULT: ABNORMAL
BH CV LOWER VASCULAR LEFT COMMON FEMORAL AUGMENT: NORMAL
BH CV LOWER VASCULAR LEFT COMMON FEMORAL COMPETENT: NORMAL
BH CV LOWER VASCULAR LEFT COMMON FEMORAL COMPRESS: NORMAL
BH CV LOWER VASCULAR LEFT COMMON FEMORAL PHASIC: NORMAL
BH CV LOWER VASCULAR LEFT COMMON FEMORAL SPONT: NORMAL
BH CV LOWER VASCULAR LEFT DISTAL FEMORAL COMPRESS: NORMAL
BH CV LOWER VASCULAR LEFT GASTRONEMIUS COMPRESS: NORMAL
BH CV LOWER VASCULAR LEFT GREATER SAPH AK COMPRESS: NORMAL
BH CV LOWER VASCULAR LEFT GREATER SAPH BK COMPRESS: NORMAL
BH CV LOWER VASCULAR LEFT LESSER SAPH COMPRESS: NORMAL
BH CV LOWER VASCULAR LEFT MID FEMORAL AUGMENT: NORMAL
BH CV LOWER VASCULAR LEFT MID FEMORAL COMPETENT: NORMAL
BH CV LOWER VASCULAR LEFT MID FEMORAL COMPRESS: NORMAL
BH CV LOWER VASCULAR LEFT MID FEMORAL PHASIC: NORMAL
BH CV LOWER VASCULAR LEFT MID FEMORAL SPONT: NORMAL
BH CV LOWER VASCULAR LEFT PERONEAL COMPRESS: NORMAL
BH CV LOWER VASCULAR LEFT POPLITEAL AUGMENT: NORMAL
BH CV LOWER VASCULAR LEFT POPLITEAL COMPETENT: NORMAL
BH CV LOWER VASCULAR LEFT POPLITEAL COMPRESS: NORMAL
BH CV LOWER VASCULAR LEFT POPLITEAL PHASIC: NORMAL
BH CV LOWER VASCULAR LEFT POPLITEAL SPONT: NORMAL
BH CV LOWER VASCULAR LEFT POSTERIOR TIBIAL COMPRESS: NORMAL
BH CV LOWER VASCULAR LEFT PROFUNDA FEMORAL COMPRESS: NORMAL
BH CV LOWER VASCULAR LEFT PROXIMAL FEMORAL COMPRESS: NORMAL
BH CV LOWER VASCULAR LEFT SAPHENOFEMORAL JUNCTION COMPRESS: NORMAL
BH CV LOWER VASCULAR RIGHT COMMON FEMORAL AUGMENT: NORMAL
BH CV LOWER VASCULAR RIGHT COMMON FEMORAL COMPETENT: NORMAL
BH CV LOWER VASCULAR RIGHT COMMON FEMORAL COMPRESS: NORMAL
BH CV LOWER VASCULAR RIGHT COMMON FEMORAL PHASIC: NORMAL
BH CV LOWER VASCULAR RIGHT COMMON FEMORAL SPONT: NORMAL
BH CV LOWER VASCULAR RIGHT DISTAL FEMORAL COMPRESS: NORMAL
BH CV LOWER VASCULAR RIGHT GASTRONEMIUS COMPRESS: NORMAL
BH CV LOWER VASCULAR RIGHT GREATER SAPH AK COMPRESS: NORMAL
BH CV LOWER VASCULAR RIGHT GREATER SAPH BK COMPRESS: NORMAL
BH CV LOWER VASCULAR RIGHT LESSER SAPH COMPRESS: NORMAL
BH CV LOWER VASCULAR RIGHT MID FEMORAL AUGMENT: NORMAL
BH CV LOWER VASCULAR RIGHT MID FEMORAL COMPETENT: NORMAL
BH CV LOWER VASCULAR RIGHT MID FEMORAL COMPRESS: NORMAL
BH CV LOWER VASCULAR RIGHT MID FEMORAL PHASIC: NORMAL
BH CV LOWER VASCULAR RIGHT MID FEMORAL SPONT: NORMAL
BH CV LOWER VASCULAR RIGHT PERONEAL COMPRESS: NORMAL
BH CV LOWER VASCULAR RIGHT POPLITEAL AUGMENT: NORMAL
BH CV LOWER VASCULAR RIGHT POPLITEAL COMPETENT: NORMAL
BH CV LOWER VASCULAR RIGHT POPLITEAL COMPRESS: NORMAL
BH CV LOWER VASCULAR RIGHT POPLITEAL PHASIC: NORMAL
BH CV LOWER VASCULAR RIGHT POPLITEAL SPONT: NORMAL
BH CV LOWER VASCULAR RIGHT POSTERIOR TIBIAL COMPRESS: NORMAL
BH CV LOWER VASCULAR RIGHT PROFUNDA FEMORAL COMPRESS: NORMAL
BH CV LOWER VASCULAR RIGHT PROXIMAL FEMORAL COMPRESS: NORMAL
BH CV LOWER VASCULAR RIGHT SAPHENOFEMORAL JUNCTION COMPRESS: NORMAL
BOTTLE TYPE: ABNORMAL
BUN SERPL-MCNC: 21 MG/DL (ref 8–23)
BUN/CREAT SERPL: 22.1 (ref 7–25)
CALCIUM SPEC-SCNC: 8.7 MG/DL (ref 8.6–10.5)
CHLORIDE SERPL-SCNC: 99 MMOL/L (ref 98–107)
CO2 SERPL-SCNC: 27 MMOL/L (ref 22–29)
CREAT SERPL-MCNC: 0.95 MG/DL (ref 0.76–1.27)
DEPRECATED RDW RBC AUTO: 41.2 FL (ref 37–54)
EGFRCR SERPLBLD CKD-EPI 2021: 87.2 ML/MIN/1.73
ERYTHROCYTE [DISTWIDTH] IN BLOOD BY AUTOMATED COUNT: 12 % (ref 12.3–15.4)
GLUCOSE SERPL-MCNC: 91 MG/DL (ref 65–99)
HCT VFR BLD AUTO: 36.7 % (ref 37.5–51)
HGB BLD-MCNC: 12.8 G/DL (ref 13–17.7)
L PNEUMO1 AG UR QL IA: NEGATIVE
MAXIMAL PREDICTED HEART RATE: 152 BPM
MCH RBC QN AUTO: 32.2 PG (ref 26.6–33)
MCHC RBC AUTO-ENTMCNC: 34.9 G/DL (ref 31.5–35.7)
MCV RBC AUTO: 92.4 FL (ref 79–97)
MRSA DNA SPEC QL NAA+PROBE: NORMAL
PLATELET # BLD AUTO: 140 10*3/MM3 (ref 140–450)
PMV BLD AUTO: 9.2 FL (ref 6–12)
POTASSIUM SERPL-SCNC: 3.8 MMOL/L (ref 3.5–5.2)
PROCALCITONIN SERPL-MCNC: 4.11 NG/ML (ref 0–0.25)
RBC # BLD AUTO: 3.97 10*6/MM3 (ref 4.14–5.8)
S PNEUM AG SPEC QL LA: POSITIVE
SODIUM SERPL-SCNC: 136 MMOL/L (ref 136–145)
STRESS TARGET HR: 129 BPM
WBC NRBC COR # BLD: 3.92 10*3/MM3 (ref 3.4–10.8)

## 2022-04-30 PROCEDURE — 87899 AGENT NOS ASSAY W/OPTIC: CPT | Performed by: HOSPITALIST

## 2022-04-30 PROCEDURE — 80048 BASIC METABOLIC PNL TOTAL CA: CPT | Performed by: INTERNAL MEDICINE

## 2022-04-30 PROCEDURE — 87205 SMEAR GRAM STAIN: CPT | Performed by: HOSPITALIST

## 2022-04-30 PROCEDURE — 84145 PROCALCITONIN (PCT): CPT | Performed by: HOSPITALIST

## 2022-04-30 PROCEDURE — 36415 COLL VENOUS BLD VENIPUNCTURE: CPT | Performed by: INTERNAL MEDICINE

## 2022-04-30 PROCEDURE — 87070 CULTURE OTHR SPECIMN AEROBIC: CPT | Performed by: HOSPITALIST

## 2022-04-30 PROCEDURE — 93970 EXTREMITY STUDY: CPT

## 2022-04-30 PROCEDURE — 25010000002 CEFTRIAXONE PER 250 MG: Performed by: HOSPITALIST

## 2022-04-30 PROCEDURE — 87641 MR-STAPH DNA AMP PROBE: CPT | Performed by: HOSPITALIST

## 2022-04-30 PROCEDURE — 25010000002 ENOXAPARIN PER 10 MG: Performed by: INTERNAL MEDICINE

## 2022-04-30 PROCEDURE — 85027 COMPLETE CBC AUTOMATED: CPT | Performed by: INTERNAL MEDICINE

## 2022-04-30 RX ORDER — CALCIUM CARBONATE 200(500)MG
2 TABLET,CHEWABLE ORAL 3 TIMES DAILY PRN
Status: DISCONTINUED | OUTPATIENT
Start: 2022-04-30 | End: 2022-05-03

## 2022-04-30 RX ADMIN — CEFTRIAXONE 2 G: 2 INJECTION, POWDER, FOR SOLUTION INTRAMUSCULAR; INTRAVENOUS at 12:38

## 2022-04-30 RX ADMIN — Medication 1000 MCG: at 09:11

## 2022-04-30 RX ADMIN — Medication 2000 UNITS: at 09:11

## 2022-04-30 RX ADMIN — OSELTAMIVIR PHOSPHATE 75 MG: 75 CAPSULE ORAL at 20:53

## 2022-04-30 RX ADMIN — ANTACID TABLETS 2 TABLET: 500 TABLET, CHEWABLE ORAL at 00:49

## 2022-04-30 RX ADMIN — OSELTAMIVIR PHOSPHATE 75 MG: 75 CAPSULE ORAL at 09:11

## 2022-04-30 RX ADMIN — SODIUM CHLORIDE 75 ML/HR: 9 INJECTION, SOLUTION INTRAVENOUS at 17:53

## 2022-04-30 RX ADMIN — ENOXAPARIN SODIUM 40 MG: 100 INJECTION SUBCUTANEOUS at 20:53

## 2022-05-01 LAB
ALBUMIN SERPL-MCNC: 2.9 G/DL (ref 3.5–5.2)
ALBUMIN/GLOB SERPL: 1 G/DL
ALP SERPL-CCNC: 51 U/L (ref 39–117)
ALT SERPL W P-5'-P-CCNC: 17 U/L (ref 1–41)
ANION GAP SERPL CALCULATED.3IONS-SCNC: 11 MMOL/L (ref 5–15)
AST SERPL-CCNC: 18 U/L (ref 1–40)
BASOPHILS # BLD AUTO: 0.01 10*3/MM3 (ref 0–0.2)
BASOPHILS NFR BLD AUTO: 0.3 % (ref 0–1.5)
BILIRUB SERPL-MCNC: 0.7 MG/DL (ref 0–1.2)
BUN SERPL-MCNC: 21 MG/DL (ref 8–23)
BUN/CREAT SERPL: 30.4 (ref 7–25)
CALCIUM SPEC-SCNC: 8.5 MG/DL (ref 8.6–10.5)
CHLORIDE SERPL-SCNC: 101 MMOL/L (ref 98–107)
CO2 SERPL-SCNC: 23 MMOL/L (ref 22–29)
CREAT SERPL-MCNC: 0.69 MG/DL (ref 0.76–1.27)
DEPRECATED RDW RBC AUTO: 38.7 FL (ref 37–54)
EGFRCR SERPLBLD CKD-EPI 2021: 100.8 ML/MIN/1.73
EOSINOPHIL # BLD AUTO: 0.01 10*3/MM3 (ref 0–0.4)
EOSINOPHIL NFR BLD AUTO: 0.3 % (ref 0.3–6.2)
ERYTHROCYTE [DISTWIDTH] IN BLOOD BY AUTOMATED COUNT: 11.7 % (ref 12.3–15.4)
GLOBULIN UR ELPH-MCNC: 2.9 GM/DL
GLUCOSE SERPL-MCNC: 87 MG/DL (ref 65–99)
HCT VFR BLD AUTO: 37.8 % (ref 37.5–51)
HGB BLD-MCNC: 13.2 G/DL (ref 13–17.7)
IMM GRANULOCYTES # BLD AUTO: 0.04 10*3/MM3 (ref 0–0.05)
IMM GRANULOCYTES NFR BLD AUTO: 1 % (ref 0–0.5)
LYMPHOCYTES # BLD AUTO: 0.44 10*3/MM3 (ref 0.7–3.1)
LYMPHOCYTES NFR BLD AUTO: 11 % (ref 19.6–45.3)
MAGNESIUM SERPL-MCNC: 1.8 MG/DL (ref 1.6–2.4)
MCH RBC QN AUTO: 31.7 PG (ref 26.6–33)
MCHC RBC AUTO-ENTMCNC: 34.9 G/DL (ref 31.5–35.7)
MCV RBC AUTO: 90.9 FL (ref 79–97)
MONOCYTES # BLD AUTO: 0.26 10*3/MM3 (ref 0.1–0.9)
MONOCYTES NFR BLD AUTO: 6.5 % (ref 5–12)
NEUTROPHILS NFR BLD AUTO: 3.23 10*3/MM3 (ref 1.7–7)
NEUTROPHILS NFR BLD AUTO: 80.9 % (ref 42.7–76)
NRBC BLD AUTO-RTO: 0 /100 WBC (ref 0–0.2)
PLATELET # BLD AUTO: 164 10*3/MM3 (ref 140–450)
PMV BLD AUTO: 9.1 FL (ref 6–12)
POTASSIUM SERPL-SCNC: 3.7 MMOL/L (ref 3.5–5.2)
PROT SERPL-MCNC: 5.8 G/DL (ref 6–8.5)
RBC # BLD AUTO: 4.16 10*6/MM3 (ref 4.14–5.8)
SODIUM SERPL-SCNC: 135 MMOL/L (ref 136–145)
WBC NRBC COR # BLD: 3.99 10*3/MM3 (ref 3.4–10.8)

## 2022-05-01 PROCEDURE — 25010000002 ENOXAPARIN PER 10 MG: Performed by: INTERNAL MEDICINE

## 2022-05-01 PROCEDURE — 83735 ASSAY OF MAGNESIUM: CPT | Performed by: HOSPITALIST

## 2022-05-01 PROCEDURE — 25010000002 CEFTRIAXONE PER 250 MG: Performed by: HOSPITALIST

## 2022-05-01 PROCEDURE — 87040 BLOOD CULTURE FOR BACTERIA: CPT | Performed by: HOSPITALIST

## 2022-05-01 PROCEDURE — 85025 COMPLETE CBC W/AUTO DIFF WBC: CPT | Performed by: HOSPITALIST

## 2022-05-01 PROCEDURE — 80053 COMPREHEN METABOLIC PANEL: CPT | Performed by: HOSPITALIST

## 2022-05-01 RX ADMIN — OSELTAMIVIR PHOSPHATE 75 MG: 75 CAPSULE ORAL at 21:22

## 2022-05-01 RX ADMIN — CEFTRIAXONE 2 G: 2 INJECTION, POWDER, FOR SOLUTION INTRAMUSCULAR; INTRAVENOUS at 12:42

## 2022-05-01 RX ADMIN — SODIUM CHLORIDE 75 ML/HR: 9 INJECTION, SOLUTION INTRAVENOUS at 08:45

## 2022-05-01 RX ADMIN — OSELTAMIVIR PHOSPHATE 75 MG: 75 CAPSULE ORAL at 08:45

## 2022-05-01 RX ADMIN — ENOXAPARIN SODIUM 40 MG: 100 INJECTION SUBCUTANEOUS at 21:22

## 2022-05-01 RX ADMIN — Medication 2000 UNITS: at 08:45

## 2022-05-01 NOTE — PLAN OF CARE
Goal Outcome Evaluation:              Outcome Evaluation: Pt a/ox4, able to voice own needs and wants. Cont B&B, up ad karis in room. Wife stays at bedside. Pt states R sided chest pain with cough/deep breathing has resolved. Amt of sputum production has decreased as well as frequency of coughing. Denies any SOA even with activity. Safety maintained.

## 2022-05-01 NOTE — PROGRESS NOTES
Name: Terrance Gresham ADMIT: 2022   : 1955  PCP: Moose Chase MD    MRN: [id number] LOS: 2 days   AGE/SEX: 66 y.o. male  ROOM: [facility]/     Subjective   Subjective   Feeling better again today. No longer SOA. Right sided CP resolved. Cough greatly improved. No N/V/D/abd pain. Tolerating diet. Voiding well. Denies F/C/NS. No LUTS/HA/vis changes/confusion.    Review of Systems     as above    Objective   Objective   Vital Signs  Temp:  [98.3 °F (36.8 °C)-98.4 °F (36.9 °C)] 98.3 °F (36.8 °C)  Heart Rate:  [79-84] 81  Resp:  [18] 18  BP: (110-130)/(71-81) 110/71  SpO2:  [93 %-95 %] 93 %  on   ;   Device (Oxygen Therapy): room air  Body mass index is 23.99 kg/m².  Physical Exam  Vitals and nursing note reviewed. Exam conducted with a chaperone present (Wife).   Constitutional:       General: He is not in acute distress.     Appearance: He is ill-appearing. He is not toxic-appearing or diaphoretic.   HENT:      Head: Normocephalic and atraumatic.      Right Ear: External ear normal.      Left Ear: External ear normal.      Mouth/Throat:      Mouth: Mucous membranes are moist.      Pharynx: Oropharynx is clear.   Eyes:      General: No scleral icterus.        Right eye: No discharge.         Left eye: No discharge.      Extraocular Movements: Extraocular movements intact.      Conjunctiva/sclera: Conjunctivae normal.   Cardiovascular:      Rate and Rhythm: Normal rate and regular rhythm.      Pulses: Normal pulses.      Heart sounds: Normal heart sounds.   Pulmonary:      Effort: Pulmonary effort is normal. No respiratory distress.      Breath sounds: No stridor. Rales (left base) present. No wheezing or rhonchi.   Abdominal:      General: Bowel sounds are normal. There is no distension.      Palpations: Abdomen is soft. There is no mass.      Tenderness: There is no abdominal tenderness.   Musculoskeletal:         General: No swelling or deformity. Normal range of motion.      Cervical back: Neck  supple. No rigidity.   Lymphadenopathy:      Cervical: No cervical adenopathy.   Skin:     General: Skin is warm and dry.      Capillary Refill: Capillary refill takes less than 2 seconds.      Coloration: Skin is not jaundiced.      Findings: No rash.   Neurological:      General: No focal deficit present.      Mental Status: He is alert and oriented to person, place, and time. Mental status is at baseline.      Cranial Nerves: No cranial nerve deficit.      Coordination: Coordination normal.   Psychiatric:         Mood and Affect: Mood normal.         Behavior: Behavior normal.         Thought Content: Thought content normal.         Results Review     I reviewed the patient's new clinical results.  Results from last 7 days   Lab Units 05/01/22  0707 04/30/22  0414 04/29/22  1631   WBC 10*3/mm3 3.99 3.92 5.46   HEMOGLOBIN g/dL 13.2 12.8* 14.7   PLATELETS 10*3/mm3 164 140 148     Results from last 7 days   Lab Units 05/01/22  0707 04/30/22  0400 04/29/22  1631   SODIUM mmol/L 135* 136 134*   POTASSIUM mmol/L 3.7 3.8 4.0   CHLORIDE mmol/L 101 99 97*   CO2 mmol/L 23.0 27.0 24.2   BUN mg/dL 21 21 22   CREATININE mg/dL 0.69* 0.95 0.99   GLUCOSE mg/dL 87 91 148*   EGFR mL/min/1.73 100.8 87.2 83.0     Results from last 7 days   Lab Units 05/01/22  0707 04/29/22  1631   ALBUMIN g/dL 2.90* 3.10*   BILIRUBIN mg/dL 0.7 1.2   ALK PHOS U/L 51 55   AST (SGOT) U/L 18 18   ALT (SGPT) U/L 17 12     Results from last 7 days   Lab Units 05/01/22  0707 04/30/22  0400 04/29/22  1631   CALCIUM mg/dL 8.5* 8.7 9.3   ALBUMIN g/dL 2.90*  --  3.10*   MAGNESIUM mg/dL 1.8  --   --      Results from last 7 days   Lab Units 04/30/22  0400 04/29/22  1826   PROCALCITONIN ng/mL 4.11*  --    LACTATE mmol/L  --  1.2     No results found for: HGBA1C, POCGLU    No radiology results for the last day  Scheduled Medications  cefTRIAXone, 2 g, Intravenous, Q24H  cholecalciferol, 2,000 Units, Oral, Daily  enoxaparin, 40 mg, Subcutaneous, Q24H  oseltamivir,  75 mg, Oral, BID  vitamin B-12, 1,000 mcg, Oral, Weekly    Infusions  Pharmacy to Dose enoxaparin (LOVENOX),   sodium chloride, 75 mL/hr, Last Rate: 75 mL/hr (05/01/22 0845)    Diet  Diet Regular; Cardiac       Assessment/Plan     Active Hospital Problems    Diagnosis  POA   • **Pneumonia due to Streptococcus pneumoniae (HCC) [J13]  Yes   • Sepsis due to Streptococcus pneumoniae (HCC) [A40.3]  Yes   • Dyspnea [R06.00]  Yes   • Influenza A [J10.1]  Yes   • Chest pain, atypical [R07.89]  Yes      Resolved Hospital Problems   No resolved problems to display.       Very pleasant, very healthy 67yo gentleman diagnosed with influenza A on 4/27 after suffering 5 days of cough, malaise, and BECKHAM. Started Tamiflu but symptoms worsened. Presented to ER with above symptoms and pleuritic right sided CP. Imaging revealed PNA of RUL/CHIP/LLL.    Blood cultures positive for S. pneumo by BCID, await sensitivities  Urinary Ag for strep pneumo positive  MRSA screen neg  Appreciate Pulm attention to pt  Continue Rocephin and Tamiflu (though uncertain if any benefit from Tamiflu at this point)  Appreciate ID attention to pt  Repeat blood cultures sent this AM  Temp, HR, BPs all improved and pt feeling better    Pt has received pneumococcal vaccine, ID suggests outpt w/u for CVID    DDimer elevated, no PE on CTA chest, venous duplex BLEs neg for DVT    Pt has h/o HTN, HLD, GERD--all three resolved after he lost 75lbs (intentionally through diet and exercise)       · Lovenox 40 mg SC daily for DVT prophylaxis.  · Full code.  · Discussed with patient and family.  · Anticipate discharge home with family, timing yet to be determined.      Joao Morales MD  [facility]  05/01/22  12:47 EDT

## 2022-05-01 NOTE — CONSULTS
CONSULT NOTE    Infectious Diseases - Gurmeet Cline MD  [facility]       Patient Identification:  Name: Terrance Gresham  Age: 66 y.o.  Sex: male  :  1955  MRN: [id number]             Date of Consultation: 2022      Primary Care Physician: Moose Chase MD                               Requesting Physician: Dr. Morales  Reason for Consultation: Bacteremic streptococcal pneumoniae sepsis influenza and pneumonia.    Impression: Patient is a 66-year-old male who is otherwise healthy and fairly active was in his usual state of his health until last week and a half when his to grandchildren were diagnosed with respiratory tract infection as well as his wife and has been progressively declining with worsening shortness of breath and right-sided discomfort for the last week no shortness of breath and weakness.  He was diagnosed with flu and according to him and his wife at the bedside he improved somewhat initially only for the symptoms he started to get worse again.  Work-up revealed bilateral pneumonia with respiratory viral panel positive for influenza and blood cultures drawn at the time of admission come back positive for strep pneumo.  Patient has been started on IV Rocephin and Tamiflu with improvement in symptoms wellbeing.  Some residual discomfort in the right side of the chest upon coughing and taking deep breath.  Patient currently denies any new discomfort such as neck pain shoulder pain hip pain or back pain.  Patient recalls having a pneumococcal vaccine 3-4 years ago.  Patient did have some intended weight loss few years ago as part of the fitness program and currently fairly active and rides bike and so forth.  This presentation in the above context is consistent with:  1-systemic sepsis with  2-pneumococcal bacteremic pneumonia secondary to  3-systemic influenza  4-at risk of complications of pneumonia with bacteremia such as evolving empyema and secondary seeding  related issues such as septic arthritis discitis and osteomyelitis not present at this time.  5-other diagnosis per primary team.      Recommendations/Discussions:  At this juncture I agree with the care plan consisting of supportive care and IV Rocephin and Tamiflu.  Repeat blood culture on 5/1/2022 after being on this regimen for 24 hours and provide exhaustive review of systems to identify areas of secondary seeding.  The patient does not have any complications such as empyema or signs and symptoms of secondary infection due to seeding and he continues to do well and remains afebrile for more than 24 hours then his antibiotic regimen can be switched to antibiotic class with better bioavailability based on the sensitivity of the pathogen.  Would recommend 7 days of antibiotic treatment assuming there is no complications and no evidence of secondary seeding.  Down the road patient vaccination status  needs to be updated as well as work-up for possible common variable immunodeficiency need to be considered.  Thank you Dr. Morales for letting me be the part of your patient care.  We will follow this patient with you.      History of Present Illness:   Patient is a 66-year-old male who is otherwise healthy and fairly active was in his usual state of his health until last week and a half when his to grandchildren were diagnosed with respiratory tract infection as well as his wife and has been progressively declining with worsening shortness of breath and right-sided discomfort for the last week no shortness of breath and weakness.  He was diagnosed with flu and according to him and his wife at the bedside he improved somewhat initially only for the symptoms he started to get worse again.  Work-up revealed bilateral pneumonia with respiratory viral panel positive for influenza and blood cultures drawn at the time of admission come back positive for strep pneumo.  Patient has been started on IV Rocephin and Tamiflu with  improvement in symptoms wellbeing.  Some residual discomfort in the right side of the chest upon coughing and taking deep breath.  Patient currently denies any new discomfort such as neck pain shoulder pain hip pain or back pain.  Patient recalls having a pneumococcal vaccine 3-4 years ago.  Patient did have some intended weight loss few years ago as part of the fitness program and currently fairly active and rides bike and so forth.      Past Medical History:  Past Medical History:   Diagnosis Date   • Disc disorder     Bulging   • Enlarged prostate    • Hx of colonic polyp    • Hyperlipidemia    • Hypertension    • Kidney stone 2007   • Leukopenia      Past Surgical History:  Past Surgical History:   Procedure Laterality Date   • COLONOSCOPY N/A 12/10/2018    Procedure: COLONOSCOPY to cecuman ;  Surgeon: Jose Montgomery MD;  Location: [facility] ENDOSCOPY;  Service: Gastroenterology   • COLONOSCOPY W/ POLYPECTOMY     • CYST REMOVAL Right 2012    wrist    • KNEE ARTHROSCOPY Right 2015   • KNEE SURGERY Right 01/13/2016      Home Meds:  Medications Prior to Admission   Medication Sig Dispense Refill Last Dose   • Cholecalciferol (VITAMIN D3) 2000 UNITS capsule Take 2,000 Units by mouth Daily.   4/29/2022   • Cyanocobalamin (B-12 PO) Take 1,000 mcg by mouth 1 (One) Time Per Week.   4/29/2022   • methylPREDNISolone (MEDROL) 4 MG dose pack Take 1 tablet by mouth 2 (Two) Times a Day. Take as directed on package instructions.   4/29/2022   • naproxen (NAPROSYN) 500 MG tablet Take 500 mg by mouth 2 (Two) Times a Day With Meals.   4/29/2022   • Omega-3 Fatty Acids (FISH OIL) 1200 MG capsule capsule Take 1,200 mg by mouth 2 (Two) Times a Day.   4/29/2022   • oseltamivir (TAMIFLU) 75 MG capsule Take 75 mg by mouth 2 (Two) Times a Day.   4/29/2022     Current Meds:     Current Facility-Administered Medications:   •  acetaminophen (TYLENOL) tablet 650 mg, 650 mg, Oral, Q4H PRN, GrzegorzlJoan MD  •  calcium carbonate  "(TUMS) chewable tablet 500 mg (200 mg elemental), 2 tablet, Oral, TID PRN, Maryam Tan, APRN, 2 tablet at 04/30/22 0049  •  cefTRIAXone (ROCEPHIN) 2 g in sodium chloride 0.9 % 100 mL IVPB-VTB, 2 g, Intravenous, Q24H, Joao Morales MD, Last Rate: 200 mL/hr at 04/30/22 1238, 2 g at 04/30/22 1238  •  cholecalciferol (VITAMIN D3) tablet 2,000 Units, 2,000 Units, Oral, Daily, Joan Griffiths MD, 2,000 Units at 04/30/22 0911  •  Enoxaparin Sodium (LOVENOX) syringe 40 mg, 40 mg, Subcutaneous, Q24H, Joan Griffiths MD, 40 mg at 04/30/22 2053  •  melatonin tablet 3 mg, 3 mg, Oral, Nightly PRN, Joan Griffiths MD  •  nitroglycerin (NITROSTAT) SL tablet 0.4 mg, 0.4 mg, Sublingual, Q5 Min PRN, Joan Griffiths MD  •  ondansetron (ZOFRAN) tablet 4 mg, 4 mg, Oral, Q6H PRN **OR** ondansetron (ZOFRAN) injection 4 mg, 4 mg, Intravenous, Q6H PRN, Joan Griffiths MD  •  oseltamivir (TAMIFLU) capsule 75 mg, 75 mg, Oral, BID, Joan Griffiths MD, 75 mg at 04/30/22 2053  •  Pharmacy to Dose enoxaparin (LOVENOX), , Does not apply, Continuous PRN, Joan Griffiths MD  •  Insert peripheral IV, , , Once **AND** sodium chloride 0.9 % flush 10 mL, 10 mL, Intravenous, PRN, Bhanu Cardona II, MD  •  sodium chloride 0.9 % infusion, 75 mL/hr, Intravenous, Continuous, Joan Griffiths MD, Last Rate: 75 mL/hr at 04/30/22 1753, 75 mL/hr at 04/30/22 1753  •  vitamin B-12 (CYANOCOBALAMIN) tablet 1,000 mcg, 1,000 mcg, Oral, Weekly, Stingl, Joan Amador MD, 1,000 mcg at 04/30/22 0911  Allergies:  No Known Allergies  Social History:   Social History     Tobacco Use   • Smoking status: Never Smoker   • Smokeless tobacco: Never Used   Substance Use Topics   • Alcohol use: Yes     Alcohol/week: 4.0 standard drinks     Types: 4 Cans of beer per week     Comment: \"4 beers on weekends\"       Family History:  Family History   Problem Relation Age of Onset   • Stomach cancer Mother    • Stroke " "Mother    • Breast cancer Mother    • Lung cancer Mother    • Prostate cancer Father    • Alzheimer's disease Father    • Kidney disease Father    • Heart disease Father    • Hypertension Father    • Cancer Paternal Grandfather    • No Known Problems Sister    • Prostate cancer Brother 60   • Other Maternal Grandmother         Circulation issues; leg amputation   • Heart attack Maternal Grandfather    • Obesity Brother    • Sleep apnea Brother           Review of Systems  See history of present illness and past medical history.    As described in history present illness.  Remainder of ROS is negative.      Vitals:   /80 (BP Location: Right arm, Patient Position: Lying)   Pulse 82   Temp 98.4 °F (36.9 °C) (Oral)   Resp 18   Ht 180.3 cm (71\")   Wt 78 kg (172 lb)   SpO2 95%   BMI 23.99 kg/m²   I/O:     Intake/Output Summary (Last 24 hours) at 4/30/2022 2138  Last data filed at 4/30/2022 1753  Gross per 24 hour   Intake 1100 ml   Output 200 ml   Net 900 ml     Exam:  Patient is examined using the personal protective equipment as per guidelines from infection control for this particular patient as enacted.  Hand washing was performed before and after patient interaction.  General Appearance:   Alert cooperative nontoxic-appearing male   Head:    Normocephalic, without obvious abnormality, atraumatic   Eyes:    PERRL, conjunctivae/corneas clear, EOM's intact, both eyes   Ears:    Normal external ear canals, both ears   Nose:   Nares normal, septum midline, mucosa normal, no drainage    or sinus tenderness   Throat:   Lips, tongue, gums normal; oral mucosa pink and moist   Neck:   Supple, symmetrical, trachea midline, no adenopathy;     thyroid:  no enlargement/tenderness/nodules; no carotid    bruit or JVD   Back:     Symmetric, no curvature, ROM normal, no CVA tenderness   Lungs:    Decreased breath sounds bilaterally   Chest Wall:    No tenderness or deformity    Heart:    Regular rate and rhythm, S1 and S2 " normal, no murmur, rub   or gallop   Abdomen:    Soft nontender   Extremities:   Extremities normal, atraumatic, no cyanosis or edema   Pulses:   Pulses palpable in all extremities; symmetric all extremities   Skin:   Skin color normal, Skin is warm and dry,  no rashes or palpable lesions   Neurologic:   CNII-XII intact, motor strength grossly intact, sensation grossly intact to light touch, no focal deficits noted       Data Review:    I reviewed the patient's new clinical results.  Results from last 7 days   Lab Units 04/30/22  0414 04/29/22  1631   WBC 10*3/mm3 3.92 5.46   HEMOGLOBIN g/dL 12.8* 14.7   PLATELETS 10*3/mm3 140 148     Results from last 7 days   Lab Units 04/30/22  0400 04/29/22  1631   SODIUM mmol/L 136 134*   POTASSIUM mmol/L 3.8 4.0   CHLORIDE mmol/L 99 97*   CO2 mmol/L 27.0 24.2   BUN mg/dL 21 22   CREATININE mg/dL 0.95 0.99   CALCIUM mg/dL 8.7 9.3   GLUCOSE mg/dL 91 148*     Microbiology Results (last 10 days)     Procedure Component Value - Date/Time    Respiratory Culture - Sputum, Cough [[id number]] Collected: 04/30/22 1113    Lab Status: Preliminary result Specimen: Sputum from Cough Updated: 04/30/22 1615     Gram Stain Less than 10 Epithelial cells per low power field      Few (2+) WBCs seen      Few (2+) Gram positive cocci      Few (2+) Gram negative bacilli    Legionella Antigen, Urine - Urine, Urine, Clean Catch [[id number]]  (Normal) Collected: 04/30/22 1112    Lab Status: Final result Specimen: Urine, Clean Catch Updated: 04/30/22 1138     LEGIONELLA ANTIGEN, URINE Negative    S. Pneumo Ag Urine or CSF - Urine, Urine, Clean Catch [[id number]]  (Abnormal) Collected: 04/30/22 1112    Lab Status: Final result Specimen: Urine, Clean Catch Updated: 04/30/22 1138     Strep Pneumo Ag Positive    MRSA Screen, PCR (Inpatient) - Swab, Nares [[id number]]  (Normal) Collected: 04/30/22 1111    Lab Status: Final result Specimen: Swab from Nares Updated: 04/30/22 1234     MRSA PCR No MRSA Detected     Blood Culture - Blood, Blood, Central Line [[id number]]  (Abnormal) Collected: 04/29/22 1849    Lab Status: Preliminary result Specimen: Blood, Central Line Updated: 04/30/22 0827     Blood Culture Abnormal Stain     Gram Stain Aerobic Bottle Gram positive cocci in pairs    Respiratory Panel PCR w/COVID-19(SARS-CoV-2) HARVEY/NEEL/ISABEL/PAD/COR/MAD/LISSY In-House, NP Swab in UTM/VTM, 3-4 HR TAT - Swab, Nasopharynx [[id number]]  (Abnormal) Collected: 04/29/22 1826    Lab Status: Final result Specimen: Swab from Nasopharynx Updated: 04/29/22 1935     ADENOVIRUS, PCR Not Detected     Coronavirus 229E Not Detected     Coronavirus HKU1 Not Detected     Coronavirus NL63 Not Detected     Coronavirus OC43 Not Detected     COVID19 Not Detected     Human Metapneumovirus Not Detected     Human Rhinovirus/Enterovirus Not Detected     Influenza A H3 Detected     Influenza B PCR Not Detected     Parainfluenza Virus 1 Not Detected     Parainfluenza Virus 2 Not Detected     Parainfluenza Virus 3 Not Detected     Parainfluenza Virus 4 Not Detected     RSV, PCR Not Detected     Bordetella pertussis pcr Not Detected     Bordetella parapertussis PCR Not Detected     Chlamydophila pneumoniae PCR Not Detected     Mycoplasma pneumo by PCR Not Detected    Narrative:      In the setting of a positive respiratory panel with a viral infection PLUS a negative procalcitonin without other underlying concern for bacterial infection, consider observing off antibiotics or discontinuation of antibiotics and continue supportive care. If the respiratory panel is positive for atypical bacterial infection (Bordetella pertussis, Chlamydophila pneumoniae, or Mycoplasma pneumoniae), consider antibiotic de-escalation to target atypical bacterial infection.    Blood Culture - Blood, Arm, Left [[id number]]  (Abnormal) Collected: 04/29/22 1826    Lab Status: Preliminary result Specimen: Blood from Arm, Left Updated: 04/30/22 0827     Blood Culture Abnormal Stain     Gram  Stain Anaerobic Bottle Gram positive cocci in pairs      Aerobic Bottle Gram positive cocci in pairs    Blood Culture ID, PCR - Blood, Arm, Left [[id number]]  (Abnormal) Collected: 04/29/22 1826    Lab Status: Final result Specimen: Blood from Arm, Left Updated: 04/30/22 0846     BCID, PCR Streptococcus pneumoniae. Identification by BCID2 PCR.     BOTTLE TYPE Anaerobic Bottle            Assessment:  Active Hospital Problems    Diagnosis  POA   • **Pneumonia due to Streptococcus pneumoniae (HCC) [J13]  Yes   • Sepsis due to Streptococcus pneumoniae (HCC) [A40.3]  Yes   • Dyspnea [R06.00]  Yes   • Influenza A [J10.1]  Yes   • Chest pain, atypical [R07.89]  Yes      Resolved Hospital Problems   No resolved problems to display.         Plan:  See above  Gurmeet Andres MD   4/30/2022  21:38 EDT    Much of this encounter note is an electronic transcription/translation of spoken language to printed text. The electronic translation of spoken language may permit erroneous, or at times, nonsensical words or phrases to be inadvertently transcribed; Although I have reviewed the note for such errors, some may still exist

## 2022-05-01 NOTE — PLAN OF CARE
Goal Outcome Evaluation:  Plan of Care Reviewed With: patient           Outcome Evaluation: VSS. SAT STABLE ON RA. RESTED WELL OVERNIGHT WTHOUT C/O'S.

## 2022-05-01 NOTE — PROGRESS NOTES
Dr. NAGA Lugo    [address]    5/1/2022    Patient ID:  Name:  Terrance Gresham  MRN:  [id number]  1955  66 y.o.  male            CC/Reason for visit: Streptococcal pneumonia, influenza infection, bacteremia    Interval hx: Feels better.  Denies chest pain.  Still having cough but not producing much sputum    ROS: No fever, no abdominal pain    Vitals:  Vitals:    04/30/22 1928 04/30/22 2313 05/01/22 0510 05/01/22 0732   BP: 130/80 120/81  110/71   BP Location: Right arm Right arm  Right arm   Patient Position: Lying Lying  Sitting   Pulse: 82 79  81   Resp: 18 18  18   Temp: 98.4 °F (36.9 °C) 98.3 °F (36.8 °C)     TempSrc: Oral Oral     SpO2: 95% 93%  93%   Weight:   78 kg (172 lb)    Height:               Body mass index is 23.99 kg/m².    Intake/Output Summary (Last 24 hours) at 5/1/2022 1308  Last data filed at 5/1/2022 1242  Gross per 24 hour   Intake 2100 ml   Output --   Net 2100 ml       Exam:  GEN:  No distress  Alert, oriented x 3.   LUNGS: Egophony and bronchial breath sounds left base.  Rest of the lungs sound clear, no use of accessory muscles  CV:  Normal S1S2, without murmur, no edema  ABD:  Non tender, no enlarged liver or masses      Scheduled meds:  cefTRIAXone, 2 g, Intravenous, Q24H  cholecalciferol, 2,000 Units, Oral, Daily  enoxaparin, 40 mg, Subcutaneous, Q24H  oseltamivir, 75 mg, Oral, BID  vitamin B-12, 1,000 mcg, Oral, Weekly      IV meds:                      Pharmacy to Dose enoxaparin (LOVENOX),         Data Review:   I reviewed the patient's medications and new clinical results.    COVID19   Date Value Ref Range Status   04/29/2022 Not Detected Not Detected - Ref. Range Final         Lab Results   Component Value Date    CALCIUM 8.5 (L) 05/01/2022    MG 1.8 05/01/2022     Results from last 7 days   Lab Units 05/01/22  0707 04/30/22  0414 04/30/22  0400 04/29/22  1631   SODIUM mmol/L 135*  --  136 134*   POTASSIUM mmol/L 3.7  --  3.8 4.0   CHLORIDE mmol/L  101  --  99 97*   CO2 mmol/L 23.0  --  27.0 24.2   BUN mg/dL 21  --  21 22   CREATININE mg/dL 0.69*  --  0.95 0.99   CALCIUM mg/dL 8.5*  --  8.7 9.3   BILIRUBIN mg/dL 0.7  --   --  1.2   ALK PHOS U/L 51  --   --  55   ALT (SGPT) U/L 17  --   --  12   AST (SGOT) U/L 18  --   --  18   GLUCOSE mg/dL 87  --  91 148*   WBC 10*3/mm3 3.99 3.92  --  5.46   HEMOGLOBIN g/dL 13.2 12.8*  --  14.7   PLATELETS 10*3/mm3 164 140  --  148   PROBNP pg/mL  --   --   --  334.0   PROCALCITONIN ng/mL  --   --  4.11*  --      Results from last 7 days   Lab Units 04/30/22  1113 04/29/22  1849 04/29/22  1826   BLOODCX   --  Gram Positive Cocci* Gram Positive Cocci*   RESPCX  Light growth (2+) The culture consists of normal respiratory socorro. This is a preliminary report; final report to follow.  --   --    BCIDPCR   --   --  Streptococcus pneumoniae. Identification by BCID2 PCR.*             ASSESSMENT:     Pneumonia due to Streptococcus pneumoniae (HCC)    Chest pain, atypical    Sepsis due to Streptococcus pneumoniae (HCC)    Dyspnea    Influenza A        PLAN:  Continue IV antibiotics today.  Switch to oral antibiotics tomorrow.  If patient remains afebrile until Tuesday on oral antibiotics, and does not complain of chest pain or any other signs or symptoms of complications from his pneumonia, could potentially be discharged on Tuesday.  Also being seen by infectious diseases.  Not needing oxygen.          Femi Lugo MD  5/1/2022

## 2022-05-02 PROBLEM — R79.89 ELEVATED D-DIMER: Status: ACTIVE | Noted: 2022-05-02

## 2022-05-02 PROBLEM — R07.89 CHEST PAIN, ATYPICAL: Status: RESOLVED | Noted: 2017-02-24 | Resolved: 2022-05-02

## 2022-05-02 PROBLEM — B95.3 BACTEREMIA DUE TO STREPTOCOCCUS PNEUMONIAE: Status: ACTIVE | Noted: 2022-05-02

## 2022-05-02 PROBLEM — R78.81 BACTEREMIA DUE TO STREPTOCOCCUS PNEUMONIAE: Status: ACTIVE | Noted: 2022-05-02

## 2022-05-02 PROBLEM — R06.00 DYSPNEA: Status: RESOLVED | Noted: 2022-04-30 | Resolved: 2022-05-02

## 2022-05-02 LAB
ANION GAP SERPL CALCULATED.3IONS-SCNC: 8 MMOL/L (ref 5–15)
BACTERIA SPEC AEROBE CULT: ABNORMAL
BACTERIA SPEC AEROBE CULT: ABNORMAL
BACTERIA SPEC RESP CULT: NORMAL
BUN SERPL-MCNC: 16 MG/DL (ref 8–23)
BUN/CREAT SERPL: 20 (ref 7–25)
CALCIUM SPEC-SCNC: 8.3 MG/DL (ref 8.6–10.5)
CHLORIDE SERPL-SCNC: 102 MMOL/L (ref 98–107)
CO2 SERPL-SCNC: 26 MMOL/L (ref 22–29)
CREAT SERPL-MCNC: 0.8 MG/DL (ref 0.76–1.27)
DEPRECATED RDW RBC AUTO: 41.1 FL (ref 37–54)
EGFRCR SERPLBLD CKD-EPI 2021: 96.4 ML/MIN/1.73
ERYTHROCYTE [DISTWIDTH] IN BLOOD BY AUTOMATED COUNT: 11.8 % (ref 12.3–15.4)
GLUCOSE SERPL-MCNC: 88 MG/DL (ref 65–99)
GRAM STN SPEC: ABNORMAL
GRAM STN SPEC: NORMAL
HCT VFR BLD AUTO: 36.3 % (ref 37.5–51)
HGB BLD-MCNC: 12.2 G/DL (ref 13–17.7)
ISOLATED FROM: ABNORMAL
MAGNESIUM SERPL-MCNC: 1.8 MG/DL (ref 1.6–2.4)
MCH RBC QN AUTO: 31.7 PG (ref 26.6–33)
MCHC RBC AUTO-ENTMCNC: 33.6 G/DL (ref 31.5–35.7)
MCV RBC AUTO: 94.3 FL (ref 79–97)
PLATELET # BLD AUTO: 168 10*3/MM3 (ref 140–450)
PMV BLD AUTO: 8.8 FL (ref 6–12)
POTASSIUM SERPL-SCNC: 4 MMOL/L (ref 3.5–5.2)
RBC # BLD AUTO: 3.85 10*6/MM3 (ref 4.14–5.8)
SODIUM SERPL-SCNC: 136 MMOL/L (ref 136–145)
WBC NRBC COR # BLD: 3.71 10*3/MM3 (ref 3.4–10.8)

## 2022-05-02 PROCEDURE — 25010000002 CEFTRIAXONE PER 250 MG: Performed by: HOSPITALIST

## 2022-05-02 PROCEDURE — 85027 COMPLETE CBC AUTOMATED: CPT | Performed by: HOSPITALIST

## 2022-05-02 PROCEDURE — 83735 ASSAY OF MAGNESIUM: CPT | Performed by: HOSPITALIST

## 2022-05-02 PROCEDURE — 80048 BASIC METABOLIC PNL TOTAL CA: CPT | Performed by: HOSPITALIST

## 2022-05-02 PROCEDURE — 25010000002 ENOXAPARIN PER 10 MG: Performed by: INTERNAL MEDICINE

## 2022-05-02 RX ADMIN — ENOXAPARIN SODIUM 40 MG: 100 INJECTION SUBCUTANEOUS at 20:15

## 2022-05-02 RX ADMIN — Medication 2000 UNITS: at 09:28

## 2022-05-02 RX ADMIN — CEFTRIAXONE 2 G: 2 INJECTION, POWDER, FOR SOLUTION INTRAMUSCULAR; INTRAVENOUS at 13:20

## 2022-05-02 NOTE — PLAN OF CARE
Goal Outcome Evaluation:  Plan of Care Reviewed With: patient           Outcome Evaluation: VSS. Pt appeared to sleep well between care. Up ad karsi. No c/o SOA. Will CTM ,safety maintained.

## 2022-05-02 NOTE — PROGRESS NOTES
Name: Terrance Gresham ADMIT: 2022   : 1955  PCP: Moose Chase MD    MRN: [id number] LOS: 3 days   AGE/SEX: 66 y.o. male  ROOM: [facility]     Subjective   Subjective   Seen sitting up in chair with wife at bedside.  He reports a good appetite and a bowel movement yesterday.  They are both eager to go home    Review of Systems  Denies chest pain, shortness of breath, abdominal pain or fevers     Objective   Objective   Vital Signs  Temp:  [97.8 °F (36.6 °C)-98.5 °F (36.9 °C)] 98.4 °F (36.9 °C)  Heart Rate:  [73-99] 75  Resp:  [18] 18  BP: (113-124)/(77-95) 120/95  SpO2:  [92 %-95 %] 95 %  on   ;   Device (Oxygen Therapy): room air  Body mass index is 23.99 kg/m².  Physical Exam  Constitutional:       General: He is not in acute distress.     Appearance: He is not ill-appearing.   Cardiovascular:      Rate and Rhythm: Normal rate and regular rhythm.   Pulmonary:      Effort: Pulmonary effort is normal. No respiratory distress.      Breath sounds: No wheezing or rhonchi.   Abdominal:      Palpations: Abdomen is soft.      Tenderness: There is no abdominal tenderness. There is no guarding.   Musculoskeletal:      Right lower leg: No edema.      Left lower leg: No edema.   Neurological:      Mental Status: He is alert.   Psychiatric:         Mood and Affect: Mood normal.         Results Review     I reviewed the patient's new clinical results.  Results from last 7 days   Lab Units 22  0444 22  0707 22  0414 22  1631   WBC 10*3/mm3 3.71 3.99 3.92 5.46   HEMOGLOBIN g/dL 12.2* 13.2 12.8* 14.7   PLATELETS 10*3/mm3 168 164 140 148     Results from last 7 days   Lab Units 22  0444 22  0707 22  0400 22  1631   SODIUM mmol/L 136 135* 136 134*   POTASSIUM mmol/L 4.0 3.7 3.8 4.0   CHLORIDE mmol/L 102 101 99 97*   CO2 mmol/L 26.0 23.0 27.0 24.2   BUN mg/dL 16 21 21 22   CREATININE mg/dL 0.80 0.69* 0.95 0.99   GLUCOSE mg/dL 88 87 91 148*   Estimated Creatinine  Clearance: 97.5 mL/min (by C-G formula based on SCr of 0.8 mg/dL).  Results from last 7 days   Lab Units 05/01/22  0707 04/29/22  1631   ALBUMIN g/dL 2.90* 3.10*   BILIRUBIN mg/dL 0.7 1.2   ALK PHOS U/L 51 55   AST (SGOT) U/L 18 18   ALT (SGPT) U/L 17 12     Results from last 7 days   Lab Units 05/02/22  0444 05/01/22  0707 04/30/22  0400 04/29/22  1631   CALCIUM mg/dL 8.3* 8.5* 8.7 9.3   ALBUMIN g/dL  --  2.90*  --  3.10*   MAGNESIUM mg/dL 1.8 1.8  --   --      Results from last 7 days   Lab Units 04/30/22  0400 04/29/22  1826   PROCALCITONIN ng/mL 4.11*  --    LACTATE mmol/L  --  1.2     COVID19   Date Value Ref Range Status   04/29/2022 Not Detected Not Detected - Ref. Range Final     SARS-CoV-2, [place]   Date Value Ref Range Status   11/24/2020 Not Detected Not Detected Final     Comment:     This nucleic acid amplification test was developed and its performance  characteristics determined by [facility]. Nucleic acid  amplification tests include PCR and TMA. This test has not been FDA  cleared or approved. This test has been authorized by FDA under an  Emergency Use Authorization (EUA). This test is only authorized for  the duration of time the declaration that circumstances exist  justifying the authorization of the emergency use of in vitro  diagnostic tests for detection of SARS-CoV-2 virus and/or diagnosis  of COVID-19 infection under section 564(b)(1) of the Act, 21 U.S.C.  360bbb-3(b) (1), unless the authorization is terminated or revoked  sooner.  When diagnostic testing is negative, the possibility of a false  negative result should be considered in the context of a patient's  recent exposures and the presence of clinical signs and symptoms  consistent with COVID-19. An individual without symptoms of COVID-19  and who is not shedding SARS-CoV-2 virus would expect to have a  negative (not detected) result in this assay.   11/24/2020 Not Detected Not Detected Final     Comment:     This nucleic acid  amplification test was developed and its performance  characteristics determined by [facility]. Nucleic acid  amplification tests include PCR and TMA. This test has not been FDA  cleared or approved. This test has been authorized by FDA under an  Emergency Use Authorization (EUA). This test is only authorized for  the duration of time the declaration that circumstances exist  justifying the authorization of the emergency use of in vitro  diagnostic tests for detection of SARS-CoV-2 virus and/or diagnosis  of COVID-19 infection under section 564(b)(1) of the Act, 21 U.S.C.  360bbb-3(b) (1), unless the authorization is terminated or revoked  sooner.  When diagnostic testing is negative, the possibility of a false  negative result should be considered in the context of a patient's  recent exposures and the presence of clinical signs and symptoms  consistent with COVID-19. An individual without symptoms of COVID-19  and who is not shedding SARS-CoV-2 virus would expect to have a  negative (not detected) result in this assay.       No results found for: HGBA1C, POCGLU    Duplex Venous Lower Extremity - Bilateral CAR  · Venous pulsatility is present, which can indicate central venous   congestion.  · Otherwise normal bilateral lower extremity venous duplex scan.       I reviewed the patient's daily medications.  Scheduled Medications  cefTRIAXone, 2 g, Intravenous, Q24H  cholecalciferol, 2,000 Units, Oral, Daily  enoxaparin, 40 mg, Subcutaneous, Q24H  vitamin B-12, 1,000 mcg, Oral, Weekly    Infusions   Diet  Diet Regular; Cardiac       Assessment/Plan     Active Hospital Problems    Diagnosis  POA   • **Pneumonia due to Streptococcus pneumoniae (HCC) [J13]  Yes   • Elevated d-dimer [R79.89]  Yes   • Bacteremia due to Streptococcus pneumoniae [R78.81, B95.3]  Yes   • Influenza A [J10.1]  Yes      Resolved Hospital Problems    Diagnosis Date Resolved POA   • Dyspnea [R06.00] 05/02/2022 Yes   • Chest pain,  atypical [R07.89] 05/02/2022 Yes     66-year-old male, previously healthy, who presented with cough, malaise and dyspnea on exertion.  He was found to have influenza A as well as strep pneumo pneumonia and bacteremia.     Strep pneumo pneumonia and bacteremia:   -Infectious disease following, on ceftriaxone.  Repeat blood cultures are negative at 24 hours  -He received pneumococcal vaccine this admission.  He needs multi Valent pneumococcal vaccination done by his primary care physician in about 1 month.  He needs repeat chest x-ray in 3 to 4 weeks.  -follow up final ID recommendations for antibiotic selection and duration.     Influenza A: Received Tamiflu     Elevated D-dimer: CTA chest negative for PE.  Lower extremity ultrasound negative for DVT     Pt has history of HTN, HLD, GERD--all three resolved after he lost 75lbs (intentionally through diet and exercise)       · Lovenox 40 mg SC daily for DVT prophylaxis.  · Full code.  · Discussed with patient, family and nursing staff.  · Anticipate discharge home tomorrow.      Rena Allison DO  [facility]  05/02/22  14:41 EDT    Patient was placed in face mask on first look.  I wore protective equipment throughout this patient encounter including a face mask, gloves and protective eyewear.  Hand hygiene was performed before donning protective equipment and after removal when leaving the room.

## 2022-05-02 NOTE — PROGRESS NOTES
Dr. NAGA Lugo    [address]    5/2/2022    Patient ID:  Name:  Terrance Gresham  MRN:  [id number]  1955  66 y.o.  male            CC/Reason for visit: Streptococcal pneumonia, influenza infection, bacteremia     Interval hx:  Still having occasional right-sided pleuritic chest pain when he takes a deep breath but he says is better than when he was admitted.  Otherwise he does have a cough and rarely some brown sputum.  No fever or chills     ROS: No fever, no abdominal pain    Vitals:  Vitals:    05/01/22 1319 05/01/22 1847 05/01/22 2308 05/02/22 0753   BP: 105/73 113/78 117/77 124/77   BP Location: Right arm Right arm Right arm Right arm   Patient Position: Sitting Sitting Lying Lying   Pulse: 79 99 73 76   Resp: 18 18 18 18   Temp: 98 °F (36.7 °C) 98.5 °F (36.9 °C) 98.5 °F (36.9 °C) 97.8 °F (36.6 °C)   TempSrc:  Oral Oral Oral   SpO2: 95% 92% 95% 93%   Weight:       Height:               Body mass index is 23.99 kg/m².    Intake/Output Summary (Last 24 hours) at 5/2/2022 1302  Last data filed at 5/1/2022 2055  Gross per 24 hour   Intake 520 ml   Output --   Net 520 ml       Exam:  GEN:  No distress  Alert, oriented x 3.   LUNGS: Some bronchial breath sounds left base.  Rest of the lungs sound clear, no use of accessory muscles  CV:  Normal S1S2, without murmur, no edema  ABD:  Non tender, no enlarged liver or masses      Scheduled meds:  cefTRIAXone, 2 g, Intravenous, Q24H  cholecalciferol, 2,000 Units, Oral, Daily  enoxaparin, 40 mg, Subcutaneous, Q24H  vitamin B-12, 1,000 mcg, Oral, Weekly      IV meds:                           Data Review:   I reviewed the patient's medications and new clinical results.    COVID19   Date Value Ref Range Status   04/29/2022 Not Detected Not Detected - Ref. Range Final         Lab Results   Component Value Date    CALCIUM 8.3 (L) 05/02/2022    MG 1.8 05/02/2022    MG 1.8 05/01/2022     Results from last 7 days   Lab Units 05/02/22  4134  05/01/22  0707 04/30/22  0414 04/30/22  0400 04/29/22  1631   SODIUM mmol/L 136 135*  --  136 134*   POTASSIUM mmol/L 4.0 3.7  --  3.8 4.0   CHLORIDE mmol/L 102 101  --  99 97*   CO2 mmol/L 26.0 23.0  --  27.0 24.2   BUN mg/dL 16 21  --  21 22   CREATININE mg/dL 0.80 0.69*  --  0.95 0.99   CALCIUM mg/dL 8.3* 8.5*  --  8.7 9.3   BILIRUBIN mg/dL  --  0.7  --   --  1.2   ALK PHOS U/L  --  51  --   --  55   ALT (SGPT) U/L  --  17  --   --  12   AST (SGOT) U/L  --  18  --   --  18   GLUCOSE mg/dL 88 87  --  91 148*   WBC 10*3/mm3 3.71 3.99 3.92  --  5.46   HEMOGLOBIN g/dL 12.2* 13.2 12.8*  --  14.7   PLATELETS 10*3/mm3 168 164 140  --  148   PROBNP pg/mL  --   --   --   --  334.0   PROCALCITONIN ng/mL  --   --   --  4.11*  --      Results from last 7 days   Lab Units 05/01/22  0708 05/01/22  0707 04/30/22  1113 04/29/22  1849 04/29/22  1826   BLOODCX  No growth at 24 hours No growth at 24 hours  --  Streptococcus pneumoniae* Streptococcus pneumoniae*   RESPCX   --   --  Light growth (2+) Normal respiratory socorro. No S. aureus or Pseudomonas aeruginosa detected. Final report.  --   --    BCIDPCR   --   --   --   --  Streptococcus pneumoniae. Identification by BCID2 PCR.*           ASSESSMENT:     Pneumonia due to Streptococcus pneumoniae (HCC)    Chest pain, atypical    Sepsis due to Streptococcus pneumoniae (HCC)    Dyspnea    Influenza A        PLAN:  ID is managing his antibiotics.  I presume they will try to switch to oral today and observe him for another 24 hours in the hospital to make sure he does not spike fever or have any signs or symptoms of seeding infection.  He has never required oxygen.  He is overall improving every day.  No need to follow-up with pulmonary.  He will have to have his multi Valent pneumococcal vaccination done by his primary care physician in about 1 month.  We recommend annual influenza and COVID-19 vaccination.  His primary care physician will have to follow-up with chest x-ray in 3 to 4  weeks.  We will sign off        Femi Lugo MD  5/2/2022

## 2022-05-02 NOTE — PROGRESS NOTES
"  Infectious Diseases Progress Note    Gurmeet Andres MD     [facility]  [place]: 3 days  Patient Identification:  Name: Terrance Gresham  Age: 66 y.o.  Sex: male  :  1955  MRN: [id number]         Primary Care Physician: Moose Chase MD            Subjective: Overall feeling better.  Decreased discomfort in the right side of the chest but still hurts when he coughs and takes a deep breath.  Interval History: See consultation note.    Objective:    Scheduled Meds:cefTRIAXone, 2 g, Intravenous, Q24H  cholecalciferol, 2,000 Units, Oral, Daily  enoxaparin, 40 mg, Subcutaneous, Q24H  vitamin B-12, 1,000 mcg, Oral, Weekly      Continuous Infusions:Pharmacy to Dose enoxaparin (LOVENOX),         Vital signs in last 24 hours:  Temp:  [98 °F (36.7 °C)-98.5 °F (36.9 °C)] 98.5 °F (36.9 °C)  Heart Rate:  [73-99] 73  Resp:  [18] 18  BP: (105-117)/(71-78) 117/77    Intake/Output:    Intake/Output Summary (Last 24 hours) at 2022 0633  Last data filed at 2022  Gross per 24 hour   Intake 1620 ml   Output --   Net 1620 ml       Exam:  /77 (BP Location: Right arm, Patient Position: Lying)   Pulse 73   Temp 98.5 °F (36.9 °C) (Oral)   Resp 18   Ht 180.3 cm (71\")   Wt 78 kg (172 lb)   SpO2 95%   BMI 23.99 kg/m²   Patient is examined using the personal protective equipment as per guidelines from infection control for this particular patient as enacted.  Hand washing was performed before and after patient interaction.  General Appearance:    Alert, cooperative, no distress, AAOx3                          Head:    Normocephalic, without obvious abnormality, atraumatic                           Eyes:    PERRL, conjunctivae/corneas clear, EOM's intact, both eyes                         Throat:   Lips, tongue, gums normal; oral mucosa pink and moist                           Neck:   Supple, symmetrical, trachea midline, no JVD                         Lungs:    Decreased breath sounds in the " right lung base                 Chest Wall:    No tenderness or deformity                          Heart:  S1-S2 regular                  Abdomen:   Soft nontender                 Extremities:   Extremities normal, atraumatic, no cyanosis or edema                        Pulses:   Pulses palpable in all extremities                            Skin:   Skin is warm and dry,  no rashes or palpable lesions                  Neurologic: Grossly nonfocal alert and oriented x3.       Data Review:    I reviewed the patient's new clinical results.  Results from last 7 days   Lab Units 05/02/22  0444 05/01/22  0707 04/30/22  0414 04/29/22  1631   WBC 10*3/mm3 3.71 3.99 3.92 5.46   HEMOGLOBIN g/dL 12.2* 13.2 12.8* 14.7   PLATELETS 10*3/mm3 168 164 140 148     Results from last 7 days   Lab Units 05/02/22  0444 05/01/22  0707 04/30/22  0400 04/29/22  1631   SODIUM mmol/L 136 135* 136 134*   POTASSIUM mmol/L 4.0 3.7 3.8 4.0   CHLORIDE mmol/L 102 101 99 97*   CO2 mmol/L 26.0 23.0 27.0 24.2   BUN mg/dL 16 21 21 22   CREATININE mg/dL 0.80 0.69* 0.95 0.99   CALCIUM mg/dL 8.3* 8.5* 8.7 9.3   GLUCOSE mg/dL 88 87 91 148*     Microbiology Results (last 10 days)     Procedure Component Value - Date/Time    Respiratory Culture - Sputum, Cough [[id number]] Collected: 04/30/22 1113    Lab Status: Preliminary result Specimen: Sputum from Cough Updated: 05/01/22 1134     Respiratory Culture Light growth (2+) The culture consists of normal respiratory socorro. This is a preliminary report; final report to follow.     Gram Stain Less than 10 Epithelial cells per low power field      Few (2+) WBCs seen      Few (2+) Gram positive cocci      Few (2+) Gram negative bacilli    Legionella Antigen, Urine - Urine, Urine, Clean Catch [[id number]]  (Normal) Collected: 04/30/22 1112    Lab Status: Final result Specimen: Urine, Clean Catch Updated: 04/30/22 1138     LEGIONELLA ANTIGEN, URINE Negative    S. Pneumo Ag Urine or CSF - Urine, Urine, Clean Catch  [[id number]]  (Abnormal) Collected: 04/30/22 1112    Lab Status: Final result Specimen: Urine, Clean Catch Updated: 04/30/22 1138     Strep Pneumo Ag Positive    MRSA Screen, PCR (Inpatient) - Swab, Nares [[id number]]  (Normal) Collected: 04/30/22 1111    Lab Status: Final result Specimen: Swab from Nares Updated: 04/30/22 1234     MRSA PCR No MRSA Detected    Blood Culture - Blood, Blood, Central Line [[id number]]  (Abnormal) Collected: 04/29/22 1849    Lab Status: Preliminary result Specimen: Blood, Central Line Updated: 05/01/22 0808     Blood Culture Gram Positive Cocci     Gram Stain Aerobic Bottle Gram positive cocci in pairs    Respiratory Panel PCR w/COVID-19(SARS-CoV-2) HARVEY/NEEL/ISABEL/PAD/COR/MAD/LISSY In-House, NP Swab in UTM/VTM, 3-4 HR TAT - Swab, Nasopharynx [[id number]]  (Abnormal) Collected: 04/29/22 1826    Lab Status: Final result Specimen: Swab from Nasopharynx Updated: 04/29/22 1935     ADENOVIRUS, PCR Not Detected     Coronavirus 229E Not Detected     Coronavirus HKU1 Not Detected     Coronavirus NL63 Not Detected     Coronavirus OC43 Not Detected     COVID19 Not Detected     Human Metapneumovirus Not Detected     Human Rhinovirus/Enterovirus Not Detected     Influenza A H3 Detected     Influenza B PCR Not Detected     Parainfluenza Virus 1 Not Detected     Parainfluenza Virus 2 Not Detected     Parainfluenza Virus 3 Not Detected     Parainfluenza Virus 4 Not Detected     RSV, PCR Not Detected     Bordetella pertussis pcr Not Detected     Bordetella parapertussis PCR Not Detected     Chlamydophila pneumoniae PCR Not Detected     Mycoplasma pneumo by PCR Not Detected    Narrative:      In the setting of a positive respiratory panel with a viral infection PLUS a negative procalcitonin without other underlying concern for bacterial infection, consider observing off antibiotics or discontinuation of antibiotics and continue supportive care. If the respiratory panel is positive for atypical bacterial infection  (Bordetella pertussis, Chlamydophila pneumoniae, or Mycoplasma pneumoniae), consider antibiotic de-escalation to target atypical bacterial infection.    Blood Culture - Blood, Arm, Left [[id number]]  (Abnormal) Collected: 04/29/22 1826    Lab Status: Preliminary result Specimen: Blood from Arm, Left Updated: 05/01/22 0807     Blood Culture Gram Positive Cocci     Isolated from Aerobic and Anaerobic Bottles     Gram Stain Anaerobic Bottle Gram positive cocci in pairs      Aerobic Bottle Gram positive cocci in pairs    Blood Culture ID, PCR - Blood, Arm, Left [[id number]]  (Abnormal) Collected: 04/29/22 1826    Lab Status: Final result Specimen: Blood from Arm, Left Updated: 04/30/22 0846     BCID, PCR Streptococcus pneumoniae. Identification by BCID2 PCR.     BOTTLE TYPE Anaerobic Bottle            Assessment:    Pneumonia due to Streptococcus pneumoniae (HCC)    Chest pain, atypical    Sepsis due to Streptococcus pneumoniae (HCC)    Dyspnea    Influenza A  1-systemic sepsis with  2-pneumococcal bacteremic pneumonia secondary to  3-systemic influenza  4-at risk of complications of pneumonia with bacteremia such as evolving empyema and secondary seeding related issues such as septic arthritis discitis and osteomyelitis not present at this time.  5-other diagnosis per primary team.        Recommendations/Discussions:  See my discussion and recommendations on 4/30/2022.  Patient would need close monitoring and review of systems once switched to oral antibiotics on 5/3/2022 to complete 7 days of treatment for this bacteremic pneumococcal pneumonia to uncover possibility of complications ranging from empyema to disseminated infection due to bacteremic seeding.  Gurmeet Andres MD  5/2/2022  06:33 EDT    Much of this encounter note is an electronic transcription/translation of spoken language to printed text. The electronic translation of spoken language may permit erroneous, or at times, nonsensical words or phrases to be  inadvertently transcribed; Although I have reviewed the note for such errors, some may still exist

## 2022-05-02 NOTE — PROGRESS NOTES
Discharge Planning Assessment  [facility]     Patient Name: Terrance Gresham  MRN: [id number]  Today's Date: 5/2/2022    Admit Date: 4/29/2022     Discharge Needs Assessment     Row Name 05/02/22 0936       Living Environment    People in Home spouse    Name(s) of People in Home Kimberly Gresham spouse [phone]    Current Living Arrangements home    Primary Care Provided by self    Provides Primary Care For no one    Family Caregiver if Needed spouse    Family Caregiver Names Kimberly Gresham spouse [phone]    Quality of Family Relationships supportive;involved;helpful    Able to Return to Prior Arrangements yes       Resource/Environmental Concerns    Resource/Environmental Concerns none       Transition Planning    Patient/Family Anticipates Transition to home with family    Patient/Family Anticipated Services at Transition none    Transportation Anticipated family or friend will provide       Discharge Needs Assessment    Equipment Currently Used at Home none    Concerns to be Addressed denies needs/concerns at this time;no discharge needs identified               Discharge Plan     Row Name 05/02/22 0938       Plan    Plan Home    Plan Comments Spoke with pt and his wife at bedside to screen for DCP/needs.  Pt did confirm that he resides at [facility] address with his wife Kimberly [phone]  and does plna to return home at [place].  Pt reports that he is independent with amublation and ADL's.  Pt denies using any DME.  Pt did confirm his PCP and pharamcy info as correct.  Pt also stated that he has any issues affording or getting his medications.  Pt spouse will be available to transport pt home at [place].  Pt stated that he hopes to DC home tomorrow.              Continued Care and Services - Admitted Since 4/29/2022    Coordination has not been started for this encounter.       Expected Discharge Date and Time     Expected Discharge Date Expected Discharge Time    May 3, 2022          Demographic Summary     Row  Name 05/02/22 0933       General Information    Admission Type observation    Arrived From home    Referral Source admission list               Functional Status     Row Name 05/02/22 0935       Functional Status    Usual Activity Tolerance good    Current Activity Tolerance good       Functional Status, IADL    Medications independent    Meal Preparation independent    Housekeeping independent    Laundry independent    Shopping independent               Psychosocial    No documentation.                Abuse/Neglect    No documentation.                Legal    No documentation.                Substance Abuse    No documentation.                Patient Forms    No documentation.                   JODY Camarillo

## 2022-05-03 ENCOUNTER — READMISSION MANAGEMENT (OUTPATIENT)
Dept: CALL CENTER | Facility: HOSPITAL | Age: 67
End: 2022-05-03

## 2022-05-03 VITALS
WEIGHT: 168.3 LBS | TEMPERATURE: 98.1 F | BODY MASS INDEX: 23.56 KG/M2 | OXYGEN SATURATION: 92 % | RESPIRATION RATE: 20 BRPM | DIASTOLIC BLOOD PRESSURE: 81 MMHG | SYSTOLIC BLOOD PRESSURE: 122 MMHG | HEART RATE: 76 BPM | HEIGHT: 71 IN

## 2022-05-03 RX ORDER — LEVOFLOXACIN 750 MG/1
750 TABLET ORAL EVERY 24 HOURS
Status: DISCONTINUED | OUTPATIENT
Start: 2022-05-03 | End: 2022-05-03 | Stop reason: HOSPADM

## 2022-05-03 RX ORDER — LEVOFLOXACIN 750 MG/1
750 TABLET ORAL EVERY 24 HOURS
Qty: 4 TABLET | Refills: 0 | Status: SHIPPED | OUTPATIENT
Start: 2022-05-03 | End: 2022-05-07

## 2022-05-03 RX ADMIN — Medication 2000 UNITS: at 09:00

## 2022-05-03 RX ADMIN — LEVOFLOXACIN 750 MG: 750 TABLET, FILM COATED ORAL at 11:54

## 2022-05-03 NOTE — DISCHARGE SUMMARY
Patient Name: Terrance Gresham  : 1955  MRN: [id number]    Date of Admission: 2022  Date of Discharge:  5/3/2022  Primary Care Physician: Moose Chase MD      Chief Complaint:   Shortness of Breath      Discharge Diagnoses     Active Hospital Problems    Diagnosis  POA   • **Pneumonia due to Streptococcus pneumoniae (HCC) [J13]  Yes   • Elevated d-dimer [R79.89]  Yes   • Bacteremia due to Streptococcus pneumoniae [R78.81, B95.3]  Yes   • Influenza A [J10.1]  Yes      Resolved Hospital Problems    Diagnosis Date Resolved POA   • Dyspnea [R06.00] 2022 Yes   • Chest pain, atypical [R07.89] 2022 Yes        Hospital Course     Mr. Gresham is a 66-year-old male, previously healthy, who presented with cough, malaise and dyspnea on exertion.  He was found to have influenza A as well as strep pneumo pneumonia and bacteremia.     Strep pneumo pneumonia and bacteremia: He was initially treated with ceftriaxone but de-escalated to Levaquin to complete 7 days of treatment per infectious disease recommendations.  Repeat blood cultures are negative.  -He received pneumococcal vaccine this admission.  He needs multi Valent pneumococcal vaccination done by his primary care physician in about 1 month. He needs repeat chest x-ray in 3 to 4 weeks.    Influenza A: Received Tamiflu     Elevated D-dimer: CTA chest negative for PE.  Lower extremity ultrasound negative for DVT    At the time of discharge patient was told to take all medications as prescribed, keep all follow-up appointments, and call their doctor or return to the hospital with any worsening or concerning symptoms.    Day of Discharge     Subjective:  Sitting up in chair with his wife at bedside.  They are both eager to go home today.  He feels much improved and has no complaints today.    Review of Systems   Constitutional: Negative for chills and fever.   Respiratory: Negative for cough and shortness of breath.    Cardiovascular:  Negative for chest pain and palpitations.   Gastrointestinal: Negative for nausea and vomiting.       Physical Exam:  Temp:  [98.1 °F (36.7 °C)-98.3 °F (36.8 °C)] 98.1 °F (36.7 °C)  Heart Rate:  [76-80] 76  Resp:  [18-20] 20  BP: (109-122)/(77-81) 122/81  Body mass index is 23.47 kg/m².  Physical Exam  Constitutional:       General: He is not in acute distress.     Appearance: He is not ill-appearing.   Cardiovascular:      Rate and Rhythm: Normal rate and regular rhythm.   Pulmonary:      Effort: Pulmonary effort is normal. No respiratory distress.      Breath sounds: No wheezing or rhonchi.   Abdominal:      Palpations: Abdomen is soft.      Tenderness: There is no abdominal tenderness. There is no guarding.   Musculoskeletal:      Right lower leg: No edema.      Left lower leg: No edema.   Neurological:      Mental Status: He is alert.   Psychiatric:         Mood and Affect: Mood normal.         Consultants     Consult Orders (all) (From admission, onward)     Start     Ordered    04/30/22 1154  Inpatient Infectious Diseases Consult  Once        Specialty:  Infectious Diseases  Provider:  Gurmeet Andres MD    04/30/22 1153    04/30/22 0813  Inpatient Pulmonology Consult  Once        Specialty:  Pulmonary Disease  Provider:  Charli Liang MD    04/30/22 0813    04/29/22 1728  [facility] (on-call MD unless specified) Details  Once        Specialty:  Hospitalist  Provider:  Joan Griffiths MD    04/29/22 1727              Procedures     Imaging Results (All)     Procedure Component Value Units Date/Time    CT Angiogram Chest [[id number]] Collected: 04/29/22 1923     Updated: 04/29/22 1931    Narrative:      CT ANGIOGRAM CHEST-     Radiation dose reduction techniques were utilized, including automated  exposure control and exposure modulation based on body size.           Clinical: Right-sided pleuritic chest pain     CT scan of the chest performed with intravenous contrast, pulmonary  embolus protocol to include  coronal, sagittal and three-dimensional  reconstruction.     FINDINGS: There is cardiac enlargement, no pericardial effusion. There  is a small hiatal hernia demonstrated, there is wall thickening. No  aortic aneurysm nor dissection. No pulmonary embolus.     There is a sizable area of right upper lobe consolidation. There is a  focal area of consolidation within the left upper lobe laterally with  air bronchograms coursing throughout. No indication of underlying tumor.  There is a third area of consolidation demonstrated at the base of the  left lower lobe. No pleural effusion. Tracheobronchial tree is  satisfactory in appearance. Mildly enlarged mediastinal and hilar lymph  nodes, likely reactive.     Limited images through the upper abdomen demonstrate left-sided renal  cysts and nonobstructing calculi.     CONCLUSION: Dense consolidation is demonstrated within the right upper  lobe, left upper lobe and left lower lobe. This consistent with  multifocal pneumonia. No indication of pulmonary tumor or effusion. No  PE. There is a small hiatal hernia demonstrated wall thickening.  Left-sided renal cysts and calculi noted incidentally.        This report was finalized on 4/29/2022 7:28 PM by Dr. Michael Bowman M.D.       XR Chest 1 View [[id number]] Collected: 04/29/22 1646     Updated: 04/29/22 1652    Narrative:      XR CHEST 1 VW-     Clinical: Chest pain and shortness of breath     FINDINGS: Sizable area of infiltrate within the right upper lobe. There  is a rounded area of infiltrate demonstrated within the left mid lung  zone laterally. It measures approximately 4 cm in diameter. Left lung  tumor not excluded. Additionally there is a patchy area of infiltrate  demonstrated at the left lung base.     Cardiac size within normal limits. No mediastinal or hilar abnormality  has developed. No effusion or edema seen.     CONCLUSION: Diffuse infiltrate demonstrated within the right upper lobe,  patchy infiltrate  demonstrated at the left base and there is a rounded  area of infiltrate demonstrated within the left midlung zone. The  findings are consistent with multifocal, atypical pneumonia. Recommend  serial radiographs of the chest to document clearing.     This report was finalized on 4/29/2022 4:49 PM by Dr. Michael Bowman M.D.             Pertinent Labs     Results from last 7 days   Lab Units 05/02/22 0444 05/01/22  0707 04/30/22  0414 04/29/22  1631   WBC 10*3/mm3 3.71 3.99 3.92 5.46   HEMOGLOBIN g/dL 12.2* 13.2 12.8* 14.7   PLATELETS 10*3/mm3 168 164 140 148     Results from last 7 days   Lab Units 05/02/22 0444 05/01/22  0707 04/30/22  0400 04/29/22  1631   SODIUM mmol/L 136 135* 136 134*   POTASSIUM mmol/L 4.0 3.7 3.8 4.0   CHLORIDE mmol/L 102 101 99 97*   CO2 mmol/L 26.0 23.0 27.0 24.2   BUN mg/dL 16 21 21 22   CREATININE mg/dL 0.80 0.69* 0.95 0.99   GLUCOSE mg/dL 88 87 91 148*   Estimated Creatinine Clearance: 95.4 mL/min (by C-G formula based on SCr of 0.8 mg/dL).  Results from last 7 days   Lab Units 05/01/22  0707 04/29/22  1631   ALBUMIN g/dL 2.90* 3.10*   BILIRUBIN mg/dL 0.7 1.2   ALK PHOS U/L 51 55   AST (SGOT) U/L 18 18   ALT (SGPT) U/L 17 12     Results from last 7 days   Lab Units 05/02/22 0444 05/01/22  0707 04/30/22  0400 04/29/22  1631   CALCIUM mg/dL 8.3* 8.5* 8.7 9.3   ALBUMIN g/dL  --  2.90*  --  3.10*   MAGNESIUM mg/dL 1.8 1.8  --   --        Results from last 7 days   Lab Units 04/29/22  1631   TROPONIN T ng/mL <0.010   PROBNP pg/mL 334.0   D DIMER QUANT MCGFEU/mL 0.74*           Invalid input(s): LDLCALC  Results from last 7 days   Lab Units 05/01/22  0708 05/01/22  0707 04/30/22  1113 04/29/22  1849 04/29/22  1826   BLOODCX  No growth at 2 days No growth at 2 days  --  Streptococcus pneumoniae* Streptococcus pneumoniae*   RESPCX   --   --  Light growth (2+) Normal respiratory socorro. No S. aureus or Pseudomonas aeruginosa detected. Final report.  --   --    BCIDPCR   --   --   --   --   Streptococcus pneumoniae. Identification by BCID2 PCR.*       Test Results Pending at Discharge     Pending Labs     Order Current Status    Blood Culture - Blood, Arm, Left Preliminary result    Blood Culture - Blood, Arm, Left Preliminary result          Discharge Details        Discharge Medications      New Medications      Instructions Start Date   levoFLOXacin 750 MG tablet  Commonly known as: LEVAQUIN   750 mg, Oral, Every 24 Hours         Continue These Medications      Instructions Start Date   B-12 PO   1,000 mcg, Oral, Weekly      fish oil 1200 MG capsule capsule   1,200 mg, Oral, 2 Times Daily      Vitamin D3 50 MCG (2000 UT) capsule   2,000 Units, Oral, Daily         Stop These Medications    methylPREDNISolone 4 MG dose pack  Commonly known as: MEDROL     naproxen 500 MG tablet  Commonly known as: NAPROSYN     oseltamivir 75 MG capsule  Commonly known as: TAMIFLU            No Known Allergies      Discharge Disposition:  Home or Self Care    Discharge Diet:  No active diet order      Discharge Activity:   As tolerated    CODE STATUS:    Code Status and Medical Interventions:   Ordered at: 04/29/22 1918     Code Status (Patient has no pulse and is not breathing):    CPR (Attempt to Resuscitate)     Medical Interventions (Patient has pulse or is breathing):    Full       Future Appointments   Date Time Provider Department Center   5/4/2022  1:00 PM Moose Chase MD [facility]   6/8/2022  9:15 AM [facility]   6/10/2022  9:00 AM Moose Chase MD [facility]      Follow-up Information     Moose Chase MD. Schedule an appointment as soon as possible for a visit in 1 week(s).    Specialty: Internal Medicine  Contact information:  [address] [place], [place] [id number] [phone]                         Time Spent on Discharge:  Greater than 30 minutes      Rena Allison [facility]  05/03/22  11:18 EDT

## 2022-05-03 NOTE — PROGRESS NOTES
"  Infectious Diseases Progress Note    Gurmeet Andres MD     [facility]  [place]: 4 days  Patient Identification:  Name: Terrance Gresham  Age: 66 y.o.  Sex: male  :  1955  MRN: [id number]         Primary Care Physician: Moose Chase MD            Subjective: Continues to do well.  Complaining of rash on his back but nowhere else.  Denies any fever and chills.  Improved sense of wellbeing and denies any new pain or discomfort anywhere.  Appetite is getting better.  Wants to go home.  Interval History: See consultation note.    Objective:    Scheduled Meds:cefTRIAXone, 2 g, Intravenous, Q24H  cholecalciferol, 2,000 Units, Oral, Daily  enoxaparin, 40 mg, Subcutaneous, Q24H  vitamin B-12, 1,000 mcg, Oral, Weekly      Continuous Infusions:     Vital signs in last 24 hours:  Temp:  [98.1 °F (36.7 °C)-98.4 °F (36.9 °C)] 98.1 °F (36.7 °C)  Heart Rate:  [75-80] 76  Resp:  [18-20] 20  BP: (109-122)/(77-95) 122/81    Intake/Output:    Intake/Output Summary (Last 24 hours) at 5/3/2022 0940  Last data filed at 5/3/2022 0920  Gross per 24 hour   Intake 340 ml   Output --   Net 340 ml       Exam:  /81 (BP Location: Right arm, Patient Position: Lying)   Pulse 76   Temp 98.1 °F (36.7 °C) (Oral)   Resp 20   Ht 180.3 cm (71\")   Wt 76.3 kg (168 lb 4.8 oz)   SpO2 92%   BMI 23.47 kg/m²   Patient is examined using the personal protective equipment as per guidelines from infection control for this particular patient as enacted.  Hand washing was performed before and after patient interaction.  General Appearance:    Alert, cooperative, no distress, AAOx3                          Head:    Normocephalic, without obvious abnormality, atraumatic                           Eyes:    PERRL, conjunctivae/corneas clear, EOM's intact, both eyes                         Throat:   Lips, tongue, gums normal; oral mucosa pink and moist                           Neck:   Supple, symmetrical, trachea midline, no JVD     "                     Lungs:    Decreased breath sounds in the right lung base                 Chest Wall:    No tenderness or deformity                          Heart:  S1-S2 regular                  Abdomen:   Soft nontender                 Extremities:   Extremities normal, atraumatic, no cyanosis or edema                        Pulses:   Pulses palpable in all extremities                            Skin: Scattered nodular erythematous rash on the torso likely due to heat rash or due to excessive sweating while confined in the bed.                  Neurologic: Grossly nonfocal alert and oriented x3.       Data Review:    I reviewed the patient's new clinical results.  Results from last 7 days   Lab Units 05/02/22  0444 05/01/22  0707 04/30/22  0414 04/29/22  1631   WBC 10*3/mm3 3.71 3.99 3.92 5.46   HEMOGLOBIN g/dL 12.2* 13.2 12.8* 14.7   PLATELETS 10*3/mm3 168 164 140 148     Results from last 7 days   Lab Units 05/02/22  0444 05/01/22  0707 04/30/22  0400 04/29/22  1631   SODIUM mmol/L 136 135* 136 134*   POTASSIUM mmol/L 4.0 3.7 3.8 4.0   CHLORIDE mmol/L 102 101 99 97*   CO2 mmol/L 26.0 23.0 27.0 24.2   BUN mg/dL 16 21 21 22   CREATININE mg/dL 0.80 0.69* 0.95 0.99   CALCIUM mg/dL 8.3* 8.5* 8.7 9.3   GLUCOSE mg/dL 88 87 91 148*     Microbiology Results (last 10 days)     Procedure Component Value - Date/Time    Blood Culture - Blood, Arm, Left [[id number]]  (Normal) Collected: 05/01/22 0708    Lab Status: Preliminary result Specimen: Blood from Arm, Left Updated: 05/03/22 0747     Blood Culture No growth at 2 days    Blood Culture - Blood, Arm, Left [[id number]]  (Normal) Collected: 05/01/22 0707    Lab Status: Preliminary result Specimen: Blood from Arm, Left Updated: 05/03/22 0746     Blood Culture No growth at 2 days    Respiratory Culture - Sputum, Cough [[id number]] Collected: 04/30/22 1113    Lab Status: Final result Specimen: Sputum from Cough Updated: 05/02/22 1044     Respiratory Culture Light growth (2+)  Normal respiratory socorro. No S. aureus or Pseudomonas aeruginosa detected. Final report.     Gram Stain Less than 10 Epithelial cells per low power field      Few (2+) WBCs seen      Few (2+) Gram positive cocci      Few (2+) Gram negative bacilli    Legionella Antigen, Urine - Urine, Urine, Clean Catch [[id number]]  (Normal) Collected: 04/30/22 1112    Lab Status: Final result Specimen: Urine, Clean Catch Updated: 04/30/22 1138     LEGIONELLA ANTIGEN, URINE Negative    S. Pneumo Ag Urine or CSF - Urine, Urine, Clean Catch [[id number]]  (Abnormal) Collected: 04/30/22 1112    Lab Status: Final result Specimen: Urine, Clean Catch Updated: 04/30/22 1138     Strep Pneumo Ag Positive    MRSA Screen, PCR (Inpatient) - Swab, Nares [[id number]]  (Normal) Collected: 04/30/22 1111    Lab Status: Final result Specimen: Swab from Nares Updated: 04/30/22 1234     MRSA PCR No MRSA Detected    Blood Culture - Blood, Blood, Central Line [[id number]]  (Abnormal) Collected: 04/29/22 1849    Lab Status: Final result Specimen: Blood, Central Line Updated: 05/02/22 0702     Blood Culture Streptococcus pneumoniae     Gram Stain Aerobic Bottle Gram positive cocci in pairs    Narrative:      Refer to previous blood culture collected on 4/29 for [facility]'s    Respiratory Panel PCR w/COVID-19(SARS-CoV-2) [facility] In-House, NP Swab in UTM/VTM, 3-4 HR TAT - Swab, Nasopharynx [[id number]]  (Abnormal) Collected: 04/29/22 1826    Lab Status: Final result Specimen: Swab from Nasopharynx Updated: 04/29/22 1935     ADENOVIRUS, PCR Not Detected     Coronavirus 229E Not Detected     Coronavirus HKU1 Not Detected     Coronavirus NL63 Not Detected     Coronavirus OC43 Not Detected     COVID19 Not Detected     Human Metapneumovirus Not Detected     Human Rhinovirus/Enterovirus Not Detected     Influenza A H3 Detected     Influenza B PCR Not Detected     Parainfluenza Virus 1 Not Detected     Parainfluenza Virus 2 Not Detected     Parainfluenza  Virus 3 Not Detected     Parainfluenza Virus 4 Not Detected     RSV, PCR Not Detected     Bordetella pertussis pcr Not Detected     Bordetella parapertussis PCR Not Detected     Chlamydophila pneumoniae PCR Not Detected     Mycoplasma pneumo by PCR Not Detected    Narrative:      In the setting of a positive respiratory panel with a viral infection PLUS a negative procalcitonin without other underlying concern for bacterial infection, consider observing off antibiotics or discontinuation of antibiotics and continue supportive care. If the respiratory panel is positive for atypical bacterial infection (Bordetella pertussis, Chlamydophila pneumoniae, or Mycoplasma pneumoniae), consider antibiotic de-escalation to target atypical bacterial infection.    Blood Culture - Blood, Arm, Left [[id number]]  (Abnormal)  (Susceptibility) Collected: 04/29/22 1826    Lab Status: Final result Specimen: Blood from Arm, Left Updated: 05/02/22 0701     Blood Culture Streptococcus pneumoniae     Isolated from Aerobic and Anaerobic Bottles     Gram Stain Anaerobic Bottle Gram positive cocci in pairs      Aerobic Bottle Gram positive cocci in pairs    Susceptibility      Streptococcus pneumoniae      GERMAN      Ceftriaxone (Meningitis) Susceptible      Ceftriaxone (Non-meningitis) Susceptible      Levofloxacin Susceptible      Penicillin (Meningitis) Susceptible      Penicillin (Non-Meningitis) Susceptible      Vancomycin Susceptible                           Blood Culture ID, PCR - Blood, Arm, Left [[id number]]  (Abnormal) Collected: 04/29/22 1826    Lab Status: Final result Specimen: Blood from Arm, Left Updated: 04/30/22 0846     BCID, PCR Streptococcus pneumoniae. Identification by BCID2 PCR.     BOTTLE TYPE Anaerobic Bottle            Assessment:    Pneumonia due to Streptococcus pneumoniae (HCC)    Influenza A    Elevated d-dimer    Bacteremia due to Streptococcus pneumoniae  1-systemic sepsis with  2-pneumococcal bacteremic pneumonia  secondary to  3-systemic influenza  4-at risk of complications of pneumonia with bacteremia such as evolving empyema and secondary seeding related issues such as septic arthritis discitis and osteomyelitis not present at this time.  5-other diagnosis per primary team.        Recommendations/Discussions:  See my discussion and recommendation on 4/30/2022 and 5/2/2022.  I have discussed with him about various antibiotic choices and recommended Levaquin would be preferred in this situation given the bacteremic pneumonia that he has.  It has equal bioavailability from IV and oral standpoint and would need 4 more days of treatment to complete 7 days of treatment.  I explained him antibiotic side effects in general such as nausea vomiting diarrhea rash fever chills interaction with other medications and selection of resistant pathogens such as yeast or gram-negative rods or other pathogens as well as: All specific side effects such as QTC prolongation chance for arrhythmia ligament and tendon injury potential and interaction with other medications.  After listening to this information and wearing and the risks and benefits patient opted to take Levaquin and is agreeable to this treatment plan.  Discussed with patient's attending physician.  Will DC ceftriaxone and start him on Levaquin 750 mg every 24 for 4 more days to complete 7 days of treatment.  Patient is educated to keep an eye on his clinical course in terms of sense of wellbeing, any new areas of pain and discomfort in his neck shoulders hips knees back ankle as well as any discomfort in the right side of the chest if it continues to get worse to rule out possibility of disseminated infection due to pneumococcal bacteremia and localized complications in the lung due to evolving empyema which are not present at this time.  Gurmeet Andres MD  5/3/2022  09:40 EDT    Much of this encounter note is an electronic transcription/translation of spoken language to printed  text. The electronic translation of spoken language may permit erroneous, or at times, nonsensical words or phrases to be inadvertently transcribed; Although I have reviewed the note for such errors, some may still exist

## 2022-05-03 NOTE — CASE MANAGEMENT/SOCIAL WORK
Case Management Discharge Note      Final Note: d/c home no needs         Selected Continued Care - Discharged on 5/3/2022 Admission date: 4/29/2022 - Discharge disposition: Home or Self Care    Destination    No services have been selected for the patient.              Durable Medical Equipment    No services have been selected for the patient.              Dialysis/Infusion    No services have been selected for the patient.              Home Medical Care    No services have been selected for the patient.              Therapy    No services have been selected for the patient.              Community Resources    No services have been selected for the patient.              Community & DME    No services have been selected for the patient.                  Transportation Services  Private: Car    Final Discharge Disposition Code: 01 - home or self-care

## 2022-05-03 NOTE — PLAN OF CARE
Goal Outcome Evaluation:   Vital signs stable, patient up independently, safety maintained. Continuing IV antibiotic.

## 2022-05-03 NOTE — PLAN OF CARE
Goal Outcome Evaluation:  Plan of Care Reviewed With: patient, spouse        Progress: no change  Outcome Evaluation: Patient has been alert and oriented this shift. No complaints of pain. Up ad karis to and from bathroom. Patient states he is voiding well. No BM this shift. Patient has a dry cough, nonproductive. Using IS frequently. Remains on room air. Turns and repositions self. Wife at bedside. Patient is hopeful for discharge today. Vital signs stable. Call light in reach. Safety maintained.

## 2022-05-04 ENCOUNTER — OFFICE VISIT (OUTPATIENT)
Dept: FAMILY MEDICINE CLINIC | Facility: CLINIC | Age: 67
End: 2022-05-04

## 2022-05-04 ENCOUNTER — TRANSITIONAL CARE MANAGEMENT TELEPHONE ENCOUNTER (OUTPATIENT)
Dept: CALL CENTER | Facility: HOSPITAL | Age: 67
End: 2022-05-04

## 2022-05-04 VITALS
TEMPERATURE: 96.9 F | HEART RATE: 76 BPM | OXYGEN SATURATION: 98 % | RESPIRATION RATE: 18 BRPM | DIASTOLIC BLOOD PRESSURE: 80 MMHG | SYSTOLIC BLOOD PRESSURE: 128 MMHG | HEIGHT: 71 IN | BODY MASS INDEX: 23.8 KG/M2 | WEIGHT: 170 LBS

## 2022-05-04 DIAGNOSIS — J13 PNEUMONIA OF BOTH LUNGS DUE TO STREPTOCOCCUS PNEUMONIAE, UNSPECIFIED PART OF LUNG: Primary | ICD-10-CM

## 2022-05-04 PROCEDURE — 99213 OFFICE O/P EST LOW 20 MIN: CPT | Performed by: INTERNAL MEDICINE

## 2022-05-04 NOTE — OUTREACH NOTE
Call Center TCM Note    Flowsheet Row Responses   [facility] patient discharged from? [place]   Does the patient have one of the following disease processes/diagnoses(primary or secondary)? COPD/Pneumonia   Was the primary reason for admission: Pneumonia   TCM attempt successful? No   Unsuccessful attempts Attempt 1          Yasmine Collins LPN    5/4/2022, 12:55 EDT

## 2022-05-04 NOTE — OUTREACH NOTE
Call Center TCM Note    Flowsheet Row Responses   [facility] patient discharged from? [place]   Does the patient have one of the following disease processes/diagnoses(primary or secondary)? COPD/Pneumonia   Was the primary reason for admission: Pneumonia   TCM attempt successful? Yes   Call start time 1615   Call end time 1617   Discharge diagnosis Pneumonia due to Streptococcus pneumoniae    Meds reviewed with patient/caregiver? Yes   Is the patient having any side effects they believe may be caused by any medication additions or changes? No   Does the patient have all medications ordered at discharge? Yes   Is the patient taking all medications as directed (includes completed medication regime)? Yes   Does the patient have a primary care provider?  Yes   Comments regarding PCP PATIENT SAW HIS PCP TODAY   Has the patient kept scheduled appointments due by today? Yes   Has home health visited the patient within 72 hours of discharge? N/A   Pulse Ox monitoring None   Psychosocial issues? No   Did the patient receive a copy of their discharge instructions? Yes   Nursing interventions Reviewed instructions with patient   What is the patient's perception of their health status since discharge? Improving   Nursing Interventions Nurse provided patient education   If the patient is a current smoker, are they able to teach back resources for cessation? Not a smoker   Is the patient/caregiver able to teach back the hierarchy of who to call/visit for symptoms/problems? PCP, Specialist, Home health nurse, Urgent Care, ED, 911 Yes   Is the patient/caregiver able to teach back signs and symptoms of worsening condition: Fever/chills, Shortness of breath, Chest pain   Is the patient/caregiver able to teach back importance of completing antibiotic course of treatment? Yes   TCM call completed? Yes          Yasmine Collins LPN    5/4/2022, 16:20 EDT

## 2022-05-04 NOTE — OUTREACH NOTE
Prep Survey    Flowsheet Row Responses   [facility] patient discharged from? [place]   Is LACE score < 7 ? Yes   Emergency Room discharge w/ pulse ox? No   Eligibility [facility]   Date of Admission 04/29/22   Date of Discharge 05/03/22   Discharge Disposition Home or Self Care   Discharge diagnosis Pneumonia due to Streptococcus pneumoniae    Does the patient have one of the following disease processes/diagnoses(primary or secondary)? COPD/Pneumonia   Does the patient have Home health ordered? No   Is there a DME ordered? No   Prep survey completed? Yes          JULITO Reaves Registered Nurse

## 2022-05-04 NOTE — PROGRESS NOTES
"Subjective   Terrance Gresham is a 66 y.o. male. Patient is here today for   Chief Complaint   Patient presents with   • Hospital Follow Up Visit     Hospital F/U Flu and Pneumonia           Vitals:    05/04/22 1255   BP: 128/80   Pulse: 76   Resp: 18   Temp: 96.9 °F (36.1 °C)   SpO2: 98%     Body mass index is 23.72 kg/m².      Past Medical History:   Diagnosis Date   • Disc disorder     Bulging   • Enlarged prostate    • Hx of colonic polyp    • Hyperlipidemia    • Hypertension    • Kidney stone 2007   • Leukopenia       No Known Allergies   Social History     Socioeconomic History   • Marital status:      Spouse name: Kimberly   • Years of education: College   Tobacco Use   • Smoking status: Never Smoker   • Smokeless tobacco: Never Used   Vaping Use   • Vaping Use: Never used   Substance and Sexual Activity   • Alcohol use: Yes     Alcohol/week: 4.0 standard drinks     Types: 4 Cans of beer per week     Comment: \"4 beers on weekends\"    • Drug use: No   • Sexual activity: Defer        Current Outpatient Medications:   •  Cholecalciferol (VITAMIN D3) 2000 UNITS capsule, Take 2,000 Units by mouth Daily., Disp: , Rfl:   •  Cyanocobalamin (B-12 PO), Take 1,000 mcg by mouth 1 (One) Time Per Week., Disp: , Rfl:   •  levoFLOXacin (LEVAQUIN) 750 MG tablet, Take 1 tablet by mouth Daily for 4 doses. Indications: Bacteria in the Blood, Pneumonia, Disp: 4 tablet, Rfl: 0  •  Omega-3 Fatty Acids (FISH OIL) 1200 MG capsule capsule, Take 1,200 mg by mouth 2 (Two) Times a Day., Disp: , Rfl:   No current facility-administered medications for this visit.     Objective     Hospital yesterday after having been there for 5 nights.  He was admitted for a bilateral lower lobe pneumonia from influenza A and strep pneumo.    He was septic.    He never required supplemental oxygen.  He has been discharged from the hospital for day now.  He is finishing up a course of Levaquin.    He has had no fevers and no chills.    His infectious " disease doctor cautioned him about Levaquin and tendon rupture.  The patient has had no tendon pain.       Review of Systems   Constitutional: Negative.    HENT: Negative.    Respiratory: Negative.    Cardiovascular: Negative.    Musculoskeletal: Negative.    Psychiatric/Behavioral: Negative.        Physical Exam  Vitals and nursing note reviewed.   Constitutional:       Appearance: Normal appearance. He is normal weight.      Comments: Is not, neatly groomed, in no distress.   Cardiovascular:      Rate and Rhythm: Regular rhythm.      Heart sounds: Normal heart sounds. No murmur heard.    No gallop.   Pulmonary:      Effort: No respiratory distress.      Breath sounds: Normal breath sounds. No wheezing or rales.   Neurological:      Mental Status: He is alert and oriented to person, place, and time.   Psychiatric:         Mood and Affect: Mood normal.         Behavior: Behavior normal.         Thought Content: Thought content normal.           Problems Addressed this Visit        Pulmonary and Pneumonias    Pneumonia due to Streptococcus pneumoniae (HCC) - Primary    Relevant Orders    XR Chest PA & Lateral      Diagnoses       Codes Comments    Pneumonia of both lungs due to Streptococcus pneumoniae, unspecified part of lung (HCC)    -  Primary ICD-10-CM: J13  ICD-9-CM: 481             PLAN  His physical exam today is benign.    His room air oxygen saturation is 98%.    He has a follow-up appointment with me for a comprehensive physical exam on 10 [address].  His an appointment to be seen in my office for fasting labs prior to that visit.  Would like him to get a repeat PA and lateral chest x-ray prior to that visit now put an order in for that.    If he feels that he is doing worse at any point, of asked him to give me a call let me know.  No follow-ups on file.

## 2022-05-06 LAB
BACTERIA SPEC AEROBE CULT: NORMAL
BACTERIA SPEC AEROBE CULT: NORMAL

## 2022-05-12 PROCEDURE — 93005 ELECTROCARDIOGRAM TRACING: CPT | Performed by: EMERGENCY MEDICINE

## 2022-05-12 PROCEDURE — 99284 EMERGENCY DEPT VISIT MOD MDM: CPT

## 2022-05-12 PROCEDURE — 93010 ELECTROCARDIOGRAM REPORT: CPT | Performed by: INTERNAL MEDICINE

## 2022-05-12 PROCEDURE — 93005 ELECTROCARDIOGRAM TRACING: CPT

## 2022-05-13 ENCOUNTER — APPOINTMENT (OUTPATIENT)
Dept: GENERAL RADIOLOGY | Facility: HOSPITAL | Age: 67
End: 2022-05-13

## 2022-05-13 ENCOUNTER — APPOINTMENT (OUTPATIENT)
Dept: CT IMAGING | Facility: HOSPITAL | Age: 67
End: 2022-05-13

## 2022-05-13 ENCOUNTER — HOSPITAL ENCOUNTER (EMERGENCY)
Facility: HOSPITAL | Age: 67
Discharge: HOME | End: 2022-05-13
Attending: EMERGENCY MEDICINE | Admitting: EMERGENCY MEDICINE

## 2022-05-13 VITALS
BODY MASS INDEX: 23.1 KG/M2 | SYSTOLIC BLOOD PRESSURE: 110 MMHG | WEIGHT: 165 LBS | DIASTOLIC BLOOD PRESSURE: 76 MMHG | HEIGHT: 71 IN | HEART RATE: 76 BPM | RESPIRATION RATE: 18 BRPM | OXYGEN SATURATION: 97 % | TEMPERATURE: 98.6 F

## 2022-05-13 DIAGNOSIS — R07.81 PLEURITIC CHEST PAIN: Primary | ICD-10-CM

## 2022-05-13 LAB
ALBUMIN SERPL-MCNC: 3.8 G/DL (ref 3.5–5.2)
ALBUMIN/GLOB SERPL: 1.2 G/DL
ALP SERPL-CCNC: 81 U/L (ref 39–117)
ALT SERPL W P-5'-P-CCNC: 13 U/L (ref 1–41)
ANION GAP SERPL CALCULATED.3IONS-SCNC: 10.6 MMOL/L (ref 5–15)
AST SERPL-CCNC: 22 U/L (ref 1–40)
BASOPHILS # BLD AUTO: 0.06 10*3/MM3 (ref 0–0.2)
BASOPHILS NFR BLD AUTO: 0.9 % (ref 0–1.5)
BILIRUB SERPL-MCNC: 0.4 MG/DL (ref 0–1.2)
BILIRUB UR QL STRIP: NEGATIVE
BUN SERPL-MCNC: 24 MG/DL (ref 8–23)
BUN/CREAT SERPL: 27.3 (ref 7–25)
CALCIUM SPEC-SCNC: 8.8 MG/DL (ref 8.6–10.5)
CHLORIDE SERPL-SCNC: 104 MMOL/L (ref 98–107)
CLARITY UR: CLEAR
CO2 SERPL-SCNC: 23.4 MMOL/L (ref 22–29)
COLOR UR: YELLOW
CREAT SERPL-MCNC: 0.88 MG/DL (ref 0.76–1.27)
DEPRECATED RDW RBC AUTO: 44.3 FL (ref 37–54)
EGFRCR SERPLBLD CKD-EPI 2021: 93.7 ML/MIN/1.73
EOSINOPHIL # BLD AUTO: 0.06 10*3/MM3 (ref 0–0.4)
EOSINOPHIL NFR BLD AUTO: 0.9 % (ref 0.3–6.2)
ERYTHROCYTE [DISTWIDTH] IN BLOOD BY AUTOMATED COUNT: 12.2 % (ref 12.3–15.4)
GLOBULIN UR ELPH-MCNC: 3.1 GM/DL
GLUCOSE SERPL-MCNC: 86 MG/DL (ref 65–99)
GLUCOSE UR STRIP-MCNC: NEGATIVE MG/DL
HCT VFR BLD AUTO: 42.7 % (ref 37.5–51)
HGB BLD-MCNC: 13.6 G/DL (ref 13–17.7)
HGB UR QL STRIP.AUTO: NEGATIVE
IMM GRANULOCYTES # BLD AUTO: 0.03 10*3/MM3 (ref 0–0.05)
IMM GRANULOCYTES NFR BLD AUTO: 0.5 % (ref 0–0.5)
KETONES UR QL STRIP: NEGATIVE
LEUKOCYTE ESTERASE UR QL STRIP.AUTO: NEGATIVE
LYMPHOCYTES # BLD AUTO: 0.9 10*3/MM3 (ref 0.7–3.1)
LYMPHOCYTES NFR BLD AUTO: 13.9 % (ref 19.6–45.3)
MCH RBC QN AUTO: 31.5 PG (ref 26.6–33)
MCHC RBC AUTO-ENTMCNC: 31.9 G/DL (ref 31.5–35.7)
MCV RBC AUTO: 98.8 FL (ref 79–97)
MONOCYTES # BLD AUTO: 0.61 10*3/MM3 (ref 0.1–0.9)
MONOCYTES NFR BLD AUTO: 9.4 % (ref 5–12)
NEUTROPHILS NFR BLD AUTO: 4.81 10*3/MM3 (ref 1.7–7)
NEUTROPHILS NFR BLD AUTO: 74.4 % (ref 42.7–76)
NITRITE UR QL STRIP: NEGATIVE
NRBC BLD AUTO-RTO: 0 /100 WBC (ref 0–0.2)
PH UR STRIP.AUTO: 5.5 [PH] (ref 5–8)
PLATELET # BLD AUTO: 309 10*3/MM3 (ref 140–450)
PMV BLD AUTO: 8.5 FL (ref 6–12)
POTASSIUM SERPL-SCNC: 4.4 MMOL/L (ref 3.5–5.2)
PROCALCITONIN SERPL-MCNC: 0.08 NG/ML (ref 0–0.25)
PROT SERPL-MCNC: 6.9 G/DL (ref 6–8.5)
PROT UR QL STRIP: NEGATIVE
RBC # BLD AUTO: 4.32 10*6/MM3 (ref 4.14–5.8)
SODIUM SERPL-SCNC: 138 MMOL/L (ref 136–145)
SP GR UR STRIP: 1.02 (ref 1–1.03)
TROPONIN T SERPL-MCNC: <0.01 NG/ML (ref 0–0.03)
UROBILINOGEN UR QL STRIP: NORMAL
WBC NRBC COR # BLD: 6.47 10*3/MM3 (ref 3.4–10.8)

## 2022-05-13 PROCEDURE — 84145 PROCALCITONIN (PCT): CPT | Performed by: PHYSICIAN ASSISTANT

## 2022-05-13 PROCEDURE — 0 IOPAMIDOL PER 1 ML: Performed by: EMERGENCY MEDICINE

## 2022-05-13 PROCEDURE — 81003 URINALYSIS AUTO W/O SCOPE: CPT | Performed by: PHYSICIAN ASSISTANT

## 2022-05-13 PROCEDURE — 71045 X-RAY EXAM CHEST 1 VIEW: CPT

## 2022-05-13 PROCEDURE — 84484 ASSAY OF TROPONIN QUANT: CPT | Performed by: PHYSICIAN ASSISTANT

## 2022-05-13 PROCEDURE — 80053 COMPREHEN METABOLIC PANEL: CPT | Performed by: PHYSICIAN ASSISTANT

## 2022-05-13 PROCEDURE — 85025 COMPLETE CBC W/AUTO DIFF WBC: CPT | Performed by: PHYSICIAN ASSISTANT

## 2022-05-13 PROCEDURE — 71275 CT ANGIOGRAPHY CHEST: CPT

## 2022-05-13 RX ORDER — SODIUM CHLORIDE 0.9 % (FLUSH) 0.9 %
10 SYRINGE (ML) INJECTION AS NEEDED
Status: DISCONTINUED | OUTPATIENT
Start: 2022-05-13 | End: 2022-05-13 | Stop reason: HOSPADM

## 2022-05-13 RX ADMIN — IOPAMIDOL 95 ML: 755 INJECTION, SOLUTION INTRAVENOUS at 02:01

## 2022-05-13 NOTE — ED PROVIDER NOTES
MD ATTESTATION NOTE    The SE and I have discussed this patient's history, physical exam, and treatment plan.  I have reviewed the documentation and personally had a face to face interaction with the patient. I affirm the documentation and agree with the treatment and plan.  The attached note describes my personal findings.      I provided a substantive portion of the care of the patient.  I personally performed the physical exam in its entirety, and below are my findings.  For this patient encounter, the patient wore surgical mask, I wore full protective PPE including N95 and eye protection.      Brief HPI: 66-year-old male presents with complaint of left-sided chest pain that is worse with deep breathing.  It just began this evening 3 hours prior to arrival in the emergency department.  He was recently admitted to the hospital for pneumonia and influenza A.  He did have some mild pain on the right side of his chest at that time but that has gotten better.  He denies leg pain or leg swelling.  He denies shortness of breath or recurrent fever.    PHYSICAL EXAM  ED Triage Vitals [05/12/22 2232]   Temp Heart Rate Resp BP SpO2   98.6 °F (37 °C) 97 18 138/82 95 %      Temp src Heart Rate Source Patient Position BP Location FiO2 (%)   Tympanic -- -- -- --         GENERAL: Awake alert, no acute distress  HENT: nares patent  EYES: no scleral icterus, EOMI  CV: regular rhythm, normal rate  RESPIRATORY: normal effort, lungs clear to auscultation bilaterally, no wheezing or crackles  ABDOMEN: soft, nondistended, nontender throughout  MUSCULOSKELETAL: no deformity, no chest wall tenderness  NEURO: alert, moves all extremities, follows commands  PSYCH:  calm, cooperative  SKIN: warm, dry, normal to inspection, no rash    Vital signs and nursing notes reviewed.        Plan: Chest x-ray appears improved compared to prior from April 29, 2022.  I reviewed both of these in PACS.  I suspect he has pleurisy related to his recent  pneumonia however given recent hospitalization and acute onset of his pain we will obtain CTA chest to evaluate for pulmonary embolus.     Gentry Figueroa MD  05/13/22 0900

## 2022-05-13 NOTE — DISCHARGE INSTRUCTIONS
Return to the emergency department should you develop fever, worsening symptoms, new symptoms, or should you have any further concerns.

## 2022-05-13 NOTE — ED TRIAGE NOTES
To ER via PV.  C/o left sided CP.  Sharp in nature, increases with inspiration. Pt recently discharged after being treated for flu and pneumonia.  Pain started approx 45 min prior to arrival.      Pt in mask at time of triage.  Triage staff in appropriate PPE.

## 2022-05-13 NOTE — ED PROVIDER NOTES
EMERGENCY DEPARTMENT ENCOUNTER    Room Number:  03/03  Date of encounter:  5/13/2022  PCP: Moose Chase MD   Historian: Patient      HPI:  Chief Complaint: Pleuritic left-sided chest pain  A complete HPI/ROS/PMH/PSH/SH/FH are unobtainable due to: Nothing    Context: Terrance Gresham is a 66 y.o. male who presents to the ED c/o pleuritic left-sided chest pain that began approximately 3 hours PTA.  Patient states he been sleeping on his left side in a chair.  He woke up he noticed the pain.  When the pain was at its worst it was said to be significant/7-8 out of 10 on the pain scale.  It has slowly improved since its onset but remains moderately painful with deep breathing.  Patient denies nausea, vomiting, diaphoresis, significant shortness of breath.    He was admitted to the hospital for 5 days on 4/29/2022 for community-acquired pneumonia and influenza A.  While in the hospital patient had gram-positive cocci in his blood cultures.  He was then treated as a Streptococcus pneumonia. While in the hospital he was treated with IV Rocephin.  When he was discharged he was discharged on Levaquin.      PAST MEDICAL HISTORY  Active Ambulatory Problems     Diagnosis Date Noted   • Hypogonadism in male 05/09/2016   • Gastroesophageal reflux disease 05/09/2016   • Paresthesia of lower extremity 05/09/2016   • Thoracic back pain 05/09/2016   • Erectile dysfunction of nonorganic origin 05/09/2016   • Well adult exam 11/17/2016   • Leukopenia 08/29/2018   • History of colon polyps 09/07/2018   • Medicare annual wellness visit, subsequent 01/19/2022   • Community acquired pneumonia 04/29/2022   • Pneumonia due to Streptococcus pneumoniae (HCC) 04/30/2022   • Influenza A 04/30/2022   • Elevated d-dimer 05/02/2022   • Bacteremia due to Streptococcus pneumoniae 05/02/2022     Resolved Ambulatory Problems     Diagnosis Date Noted   • Adiposity 05/09/2016   • Hypercholesterolemia 05/09/2016   • Essential hypertension  "05/09/2016   • Chest pain, atypical 02/24/2017   • Depression with anxiety 03/24/2020   • Dyspnea 04/30/2022     Past Medical History:   Diagnosis Date   • Disc disorder    • Enlarged prostate    • Hx of colonic polyp    • Hyperlipidemia    • Hypertension    • Kidney stone 2007         PAST SURGICAL HISTORY  Past Surgical History:   Procedure Laterality Date   • COLONOSCOPY N/A 12/10/2018    Procedure: COLONOSCOPY to cecuman ti;  Surgeon: Jose Montgomery MD;  Location: [facility] ENDOSCOPY;  Service: Gastroenterology   • COLONOSCOPY W/ POLYPECTOMY     • CYST REMOVAL Right 2012    wrist    • KNEE ARTHROSCOPY Right 2015   • KNEE SURGERY Right 01/13/2016         FAMILY HISTORY  Family History   Problem Relation Age of Onset   • Stomach cancer Mother    • Stroke Mother    • Breast cancer Mother    • Lung cancer Mother    • Prostate cancer Father    • Alzheimer's disease Father    • Kidney disease Father    • Heart disease Father    • Hypertension Father    • Cancer Paternal Grandfather    • No Known Problems Sister    • Prostate cancer Brother 60   • Other Maternal Grandmother         Circulation issues; leg amputation   • Heart attack Maternal Grandfather    • Obesity Brother    • Sleep apnea Brother          SOCIAL HISTORY  Social History     Socioeconomic History   • Marital status:      Spouse name: Kimberly   • Years of education: College   Tobacco Use   • Smoking status: Never Smoker   • Smokeless tobacco: Never Used   Vaping Use   • Vaping Use: Never used   Substance and Sexual Activity   • Alcohol use: Yes     Alcohol/week: 4.0 standard drinks     Types: 4 Cans of beer per week     Comment: \"4 beers on weekends\"    • Drug use: No   • Sexual activity: Defer         ALLERGIES  Patient has no known allergies.        REVIEW OF SYSTEMS  Review of Systems   Constitutional: Negative for chills and fever.   HENT: Negative.    Eyes: Negative.    Respiratory: Negative for shortness of breath.    Cardiovascular: Positive " for chest pain.   Gastrointestinal: Negative.    Genitourinary: Negative.    Musculoskeletal: Negative.    Skin: Negative.    Neurological: Negative.    Psychiatric/Behavioral: Negative.         All systems reviewed and negative except for those discussed in HPI.       PHYSICAL EXAM    I have reviewed the triage vital signs and nursing notes.    ED Triage Vitals [05/12/22 2232]   Temp Heart Rate Resp BP SpO2   98.6 °F (37 °C) 97 18 138/82 95 %      Temp src Heart Rate Source Patient Position BP Location FiO2 (%)   Tympanic -- -- -- --       Physical Exam  GENERAL: DW male no acute distress  HENT: nares patent  EYES: no scleral icterus  CV: regular rhythm, regular rate normal S1-S2, no murmur, no unilateral leg swelling, no JVD  RESPIRATORY: normal effort  ABDOMEN: soft  MUSCULOSKELETAL: no deformity, chest pain not reproducible  NEURO: alert, moves all extremities, follows commands  SKIN: warm, dry        LAB RESULTS  Recent Results (from the past 24 hour(s))   ECG 12 Lead    Collection Time: 05/12/22 10:28 PM   Result Value Ref Range    QT Interval 346 ms   Urinalysis With Microscopic If Indicated (No Culture) - Urine, Clean Catch    Collection Time: 05/13/22 12:32 AM    Specimen: Urine, Clean Catch   Result Value Ref Range    Color, UA Yellow Yellow, Straw    Appearance, UA Clear Clear    pH, UA 5.5 5.0 - 8.0    Specific Gravity, UA 1.023 1.005 - 1.030    Glucose, UA Negative Negative    Ketones, UA Negative Negative    Bilirubin, UA Negative Negative    Blood, UA Negative Negative    Protein, UA Negative Negative    Leuk Esterase, UA Negative Negative    Nitrite, UA Negative Negative    Urobilinogen, UA 0.2 E.U./dL 0.2 - 1.0 E.U./dL   Comprehensive Metabolic Panel    Collection Time: 05/13/22 12:43 AM    Specimen: Arm, Right; Blood   Result Value Ref Range    Glucose 86 65 - 99 mg/dL    BUN 24 (H) 8 - 23 mg/dL    Creatinine 0.88 0.76 - 1.27 mg/dL    Sodium 138 136 - 145 mmol/L    Potassium 4.4 3.5 - 5.2 mmol/L     Chloride 104 98 - 107 mmol/L    CO2 23.4 22.0 - 29.0 mmol/L    Calcium 8.8 8.6 - 10.5 mg/dL    Total Protein 6.9 6.0 - 8.5 g/dL    Albumin 3.80 3.50 - 5.20 g/dL    ALT (SGPT) 13 1 - 41 U/L    AST (SGOT) 22 1 - 40 U/L    Alkaline Phosphatase 81 39 - 117 U/L    Total Bilirubin 0.4 0.0 - 1.2 mg/dL    Globulin 3.1 gm/dL    A/G Ratio 1.2 g/dL    BUN/Creatinine Ratio 27.3 (H) 7.0 - 25.0    Anion Gap 10.6 5.0 - 15.0 mmol/L    eGFR 93.7 >60.0 mL/min/1.73   Procalcitonin    Collection Time: 05/13/22 12:43 AM    Specimen: Arm, Right; Blood   Result Value Ref Range    Procalcitonin 0.08 0.00 - 0.25 ng/mL   Troponin    Collection Time: 05/13/22 12:43 AM    Specimen: Arm, Right; Blood   Result Value Ref Range    Troponin T <0.010 0.000 - 0.030 ng/mL   CBC Auto Differential    Collection Time: 05/13/22 12:43 AM    Specimen: Arm, Right; Blood   Result Value Ref Range    WBC 6.47 3.40 - 10.80 10*3/mm3    RBC 4.32 4.14 - 5.80 10*6/mm3    Hemoglobin 13.6 13.0 - 17.7 g/dL    Hematocrit 42.7 37.5 - 51.0 %    MCV 98.8 (H) 79.0 - 97.0 fL    MCH 31.5 26.6 - 33.0 pg    MCHC 31.9 31.5 - 35.7 g/dL    RDW 12.2 (L) 12.3 - 15.4 %    RDW-SD 44.3 37.0 - 54.0 fl    MPV 8.5 6.0 - 12.0 fL    Platelets 309 140 - 450 10*3/mm3    Neutrophil % 74.4 42.7 - 76.0 %    Lymphocyte % 13.9 (L) 19.6 - 45.3 %    Monocyte % 9.4 5.0 - 12.0 %    Eosinophil % 0.9 0.3 - 6.2 %    Basophil % 0.9 0.0 - 1.5 %    Immature Grans % 0.5 0.0 - 0.5 %    Neutrophils, Absolute 4.81 1.70 - 7.00 10*3/mm3    Lymphocytes, Absolute 0.90 0.70 - 3.10 10*3/mm3    Monocytes, Absolute 0.61 0.10 - 0.90 10*3/mm3    Eosinophils, Absolute 0.06 0.00 - 0.40 10*3/mm3    Basophils, Absolute 0.06 0.00 - 0.20 10*3/mm3    Immature Grans, Absolute 0.03 0.00 - 0.05 10*3/mm3    nRBC 0.0 0.0 - 0.2 /100 WBC       Ordered the above labs and independently reviewed the results.        RADIOLOGY  XR Chest 1 View    Result Date: 5/13/2022  Patient: NEHA SOMERS  Time Out: 03:36 Exam(s): FILM CXR 1 VIEW  EXAM:   XR Chest, 1 View CLINICAL HISTORY:    Reason for exam: Pleuritic CP on the Left. TECHNIQUE:   Frontal view of the chest. COMPARISON:   Chest x-ray of 4 29 2022 FINDINGS:   Lungs:  Patchy consolidation in the lungs bilaterally, most conspicuous in the right upper lobe is present but improved from prior.   Pleural space:  Unremarkable.  No pneumothorax.   Heart:  Unremarkable.  No cardiomegaly.   Mediastinum:  Unremarkable.   Bones joints:  Unremarkable. IMPRESSION:       Patchy consolidation in the lungs bilaterally, most conspicuous in the right upper lobe is present but improved from prior.  There is a finding suggesting resolving multifocal pneumonia.     Electronically signed by Katie Salgado MD on 05-13-22 at 0336    CT Angiogram Chest    Result Date: 5/13/2022  Patient: NEHA SOMERS  Time Out: 03:35 Exam(s): CTA CHEST EXAM:   CT Angiography Chest With Intravenous Contrast CLINICAL HISTORY:    Reason for exam: Left-sided pleuritic chest pain SPO2 95%, slightly tachycardic. Rule out PE. TECHNIQUE:   Axial computed tomographic angiography images of the chest with intravenous contrast.  CTDI is 14.5 mGy and DLP is 570.5 mGy-cm.  This CT exam was performed according to the principle of ALARA (As Low As Reasonably Achievable) by using one or more of the following dose reduction techniques: automated exposure control, adjustment of the mA and or kV according to patient size, and or use of iterative reconstruction technique.   MIP reconstructed images were created and reviewed. COMPARISON:   No relevant prior studies available. FINDINGS:   Pulmonary arteries:  No pulmonary emboli are apparent.   Aorta:  No acute findings.  No thoracic aortic aneurysm.   Lungs:  Patchy consolidation in the right upper lobe with volume loss and air bronchograms, and patchy consolidation in the posterior left upper lobe and left lower lobe.  The lungs are hyperinflated.   Pleural space:  Unremarkable.  No significant  effusion.  No pneumothorax.   Heart:  Mild cardiomegaly.  No significant pericardial effusion.  No evidence of RV dysfunction.   Mediastinum:  Moderate hiatal hernia.   Bones joints:  No acute fracture.  No dislocation.   Soft tissues:  Unremarkable.   Lymph nodes:  Unremarkable.  No enlarged lymph nodes. IMPRESSION:     1.  No pulmonary emboli. 2.  Multifocal pneumonia, possibly atypical.     Electronically signed by Katei Salgado MD on 05-13-22 at 0335      I ordered the above noted radiological studies. Reviewed by me and discussed with radiologist.  See dictation for official radiology interpretation.      PROCEDURES    Procedures      MEDICATIONS GIVEN IN ER    Medications   sodium chloride 0.9 % flush 10 mL (has no administration in time range)   iopamidol (ISOVUE-370) 76 % injection 100 mL (95 mL Intravenous Given 5/13/22 0201)         PROGRESS, DATA ANALYSIS, CONSULTS, AND MEDICAL DECISION MAKING    All labs have been independently reviewed by me.  All radiology studies have been reviewed by me and discussed with radiologist dictating the report.   EKG's independently viewed and interpreted by me.  Discussion below represents my analysis of pertinent findings related to patient's condition, differential diagnosis, treatment plan and final disposition.    DDx includes but is not limited to: Musculoskeletal pain, pleurisy, PNA, PTX, ACS, PE, recurrent pneumonia.  I am unable to reproduce the chest pain on physical exam.  To further evaluate I will begin with a CBC, CMP, procalcitonin, troponin, EKG, portable chest x-ray.    ED Course as of 05/13/22 0551   Fri May 13, 2022   0100 EKG          EKG time: 2228  Rhythm/Rate: 91/sinus  P waves and AK: Normal pr interval  QRS, axis: Borderline LAD   ST and T waves: No acute st or t wave cgs     Interpreted Contemporaneously by me, independently viewed  -04/29/2022 [RC]   0106 Chest x-ray does not demonstrate any acute process.  It appears significantly improved  from April 29, 2022.  Patient's heart rate is 97 and his SPO2 is 95% on RA.  Given the pleuritic nature of his pain, we will go ahead and order a CT angiogram of the chest to rule out PE and other complications. [RC]   0240 WBC: 6.47 [RC]   0240 RBC: 4.32 [RC]   0240 Hemoglobin: 13.6 [RC]   0240 Hematocrit: 42.7 [RC]   0240 Platelets: 309 [RC]   0240 Glucose: 86 [RC]   0240 BUN(!): 24 [RC]   0240 Creatinine: 0.88 [RC]   0240 Sodium: 138 [RC]   0240 CO2: 23.4 [RC]   0240 Anion Gap: 10.6 [RC]   0240 Procalcitonin: 0.08 [RC]   0240 HEART SCORE    History Slightly or non-suspicious (0)  ECG Normal (0)  Age > or = 65 (2)  Risk factors 1 or 2 (1)  Troponin < or = Normal limit (0)    This patient's HEART score is 3    HEART Score Key:  Scores 0-3: 0.9-1.7% risk of adverse cardiac event. In the HEART Score study, these patients were discharged (0.99% in the retrospective study, 1.7% in the prospective study)  Scores 4-6: 12-16.6% risk of adverse cardiac event. In the HEART Score study, these patients were admitted to the hospital. (11.6% retrospective, 16.6% prospective)  Scores ?7: 50-65% risk of adverse cardiac event. In the HEART Score study, these patients were candidates for early invasive measures. (65.2% retrospective, 50.1% prospective)    [RC]   0338 Procalcitonin: 0.08 [RC]   0338 CTA shows no acute PE.  Radiologist is following multifocal atypical pneumonia.  Patient CBC is normal, procalcitonin normal.  Patient's chest x-ray looks dramatically improved from 2 weeks ago.  Suspect these are residual infiltrates that are seen on the CT.  Plan at this juncture would be to treat the symptoms and have the patient follow-up closely with his PCP. [RC]      ED Course User Index  [RC] Luca Khan III, PA           PPE: The patient wore a surgical mask throughout the entire patient encounter. I wore an N95.    AS OF 05:51 EDT VITALS:    BP - 110/76  HR - 76  TEMP - 98.6 °F (37 °C) (Tympanic)  O2 SATS -  97%        DIAGNOSIS  Final diagnoses:   Pleuritic chest pain         DISPOSITION  DISCHARGE    Patient discharged in stable condition.    Reviewed implications of results, diagnosis, meds, responsibility to follow up, warning signs and symptoms of possible worsening, potential complications and reasons to return to ER.    Patient/Family voiced understanding of above instructions.    Discussed plan for discharge, as there is no emergent indication for admission. Patient referred to primary care provider for BP management due to today's BP. Pt/family is agreeable and understands need for follow up and repeat testing.  Pt is aware that discharge does not mean that nothing is wrong but it indicates no emergency is present that requires admission and they must continue care with follow-up as given below or physician of their choice.     FOLLOW-UP  Moose Chase MD  [id number] [facility] [id number] [phone]    Schedule an appointment as soon as possible for a visit   For further evaluation and treatment         Medication List      New Prescriptions    diclofenac 50 MG EC tablet  Commonly known as: VOLTAREN  Take 1 tablet by mouth 2 (Two) Times a Day.           Where to Get Your Medications      These medications were sent to [id number] - [place], [place] - [address] AT [facility] & [address] - [phone]  - [phone] FX  [address], [facility] [address]    Phone: [phone]   · diclofenac 50 MG EC tablet                Luca Khan III, PA  05/13/22 7861

## 2022-05-14 LAB — QT INTERVAL: 346 MS

## 2022-05-19 ENCOUNTER — TELEPHONE (OUTPATIENT)
Dept: FAMILY MEDICINE CLINIC | Facility: CLINIC | Age: 67
End: 2022-05-19

## 2022-05-19 NOTE — TELEPHONE ENCOUNTER
Spoke with patient, he stated that he has not been set up fo an Xray yet. Patient was in the ED where they did perform a chest Xray. Patient wants to know if he should have the one ordered by Dr. Chase done or if the one from the ED visit will do? Please have Dr. Chase advise.        Yves

## 2022-06-07 DIAGNOSIS — Z12.5 ENCOUNTER FOR SCREENING FOR MALIGNANT NEOPLASM OF PROSTATE: ICD-10-CM

## 2022-06-07 DIAGNOSIS — E29.1 HYPOGONADISM IN MALE: Primary | ICD-10-CM

## 2022-06-07 DIAGNOSIS — Z13.220 ENCOUNTER FOR SCREENING FOR LIPID DISORDER: ICD-10-CM

## 2022-06-07 DIAGNOSIS — Z13.228 ENCOUNTER FOR SCREENING FOR METABOLIC DISORDER: ICD-10-CM

## 2022-06-10 ENCOUNTER — OFFICE VISIT (OUTPATIENT)
Dept: FAMILY MEDICINE CLINIC | Facility: CLINIC | Age: 67
End: 2022-06-10

## 2022-06-10 VITALS
DIASTOLIC BLOOD PRESSURE: 74 MMHG | BODY MASS INDEX: 23.94 KG/M2 | RESPIRATION RATE: 18 BRPM | OXYGEN SATURATION: 95 % | SYSTOLIC BLOOD PRESSURE: 122 MMHG | HEART RATE: 76 BPM | HEIGHT: 71 IN | WEIGHT: 171 LBS | TEMPERATURE: 97 F

## 2022-06-10 DIAGNOSIS — J18.9 PNEUMONIA OF BOTH LUNGS DUE TO INFECTIOUS ORGANISM, UNSPECIFIED PART OF LUNG: ICD-10-CM

## 2022-06-10 DIAGNOSIS — Z00.00 ANNUAL PHYSICAL EXAM: Primary | ICD-10-CM

## 2022-06-10 DIAGNOSIS — D72.819 LEUKOPENIA, UNSPECIFIED TYPE: ICD-10-CM

## 2022-06-10 PROCEDURE — 99397 PER PM REEVAL EST PAT 65+ YR: CPT | Performed by: INTERNAL MEDICINE

## 2022-06-10 NOTE — TELEPHONE ENCOUNTER
Pt had a cxr ordered 05/04/2022 and was seen in office again today as follow up and was scheduled to have another cxr done today.

## 2022-06-10 NOTE — PROGRESS NOTES
"Acacia Gresham is a 66 y.o. male. Patient is here today for   Chief Complaint   Patient presents with   • Annual Exam     Physical           Vitals:    06/10/22 0859   BP: 122/74   Pulse: 76   Resp: 18   Temp: 97 °F (36.1 °C)   SpO2: 95%     Body mass index is 23.86 kg/m².    The following portions of the patient's history were reviewed and updated as appropriate: allergies, current medications, past family history, past medical history, past social history, past surgical history and problem list.    Past Medical History:   Diagnosis Date   • Disc disorder     Bulging   • Enlarged prostate    • Hx of colonic polyp    • Hyperlipidemia    • Hypertension    • Kidney stone 2007   • Leukopenia       No Known Allergies   Social History     Socioeconomic History   • Marital status:      Spouse name: Kimberly   • Years of education: College   Tobacco Use   • Smoking status: Never Smoker   • Smokeless tobacco: Never Used   Vaping Use   • Vaping Use: Never used   Substance and Sexual Activity   • Alcohol use: Yes     Alcohol/week: 4.0 standard drinks     Types: 4 Cans of beer per week     Comment: \"4 beers on weekends\"    • Drug use: No   • Sexual activity: Defer        Current Outpatient Medications:   •  Cholecalciferol (VITAMIN D3) 2000 UNITS capsule, Take 2,000 Units by mouth Daily., Disp: , Rfl:   •  Cyanocobalamin (B-12 PO), Take 1,000 mcg by mouth 1 (One) Time Per Week., Disp: , Rfl:   •  diclofenac (VOLTAREN) 50 MG EC tablet, Take 1 tablet by mouth 2 (Two) Times a Day., Disp: 20 tablet, Rfl: 0  •  Omega-3 Fatty Acids (FISH OIL) 1200 MG capsule capsule, Take 1,200 mg by mouth 2 (Two) Times a Day., Disp: , Rfl:      EKG  ECG Report    Objective   This patient is here for a comprehensive physical exam.    He actually feels well which is typical.    He continues to get regular aerobic activity.    For he retired, when he was at his heaviest, he weighed 250 pounds.  He now weighs 171 pounds.       "   Terrance Gresham 66 y.o. male who presents for an Annual Wellness Visit.  he has a history of   Patient Active Problem List   Diagnosis   • Hypogonadism in male   • Gastroesophageal reflux disease   • Paresthesia of lower extremity   • Thoracic back pain   • Erectile dysfunction of nonorganic origin   • Well adult exam   • Leukopenia   • History of colon polyps   • Medicare annual wellness visit, subsequent   • Community acquired pneumonia   • Pneumonia due to Streptococcus pneumoniae (HCC)   • Influenza A   • Elevated d-dimer   • Bacteremia due to Streptococcus pneumoniae   • Annual physical exam   .  he has been doing well with new interval problems.  Labs results discussed in detail with the patient.  Plan to update vaccines if needed today.        Lab Results (most recent)     None            Review of Systems   Constitutional: Negative.    HENT: Negative.    Eyes: Negative.    Respiratory: Negative.    Cardiovascular: Negative.    Gastrointestinal: Negative.    Endocrine: Negative.    Genitourinary: Negative.    Musculoskeletal: Negative.    Skin: Negative.    Allergic/Immunologic: Negative.    Neurological: Negative.    Hematological: Negative.    Psychiatric/Behavioral: Negative.        Physical Exam  Vitals and nursing note reviewed.   Constitutional:       General: He is not in acute distress.     Appearance: Normal appearance. He is normal weight. He is not ill-appearing, toxic-appearing or diaphoretic.      Comments: Pleasant, neatly groomed, in no distress.   HENT:      Head: Normocephalic and atraumatic.      Right Ear: Tympanic membrane, ear canal and external ear normal. There is no impacted cerumen.      Left Ear: Tympanic membrane, ear canal and external ear normal. There is no impacted cerumen.      Nose: Nose normal.   Eyes:      General: No scleral icterus.        Right eye: No discharge.         Left eye: No discharge.      Extraocular Movements: Extraocular movements intact.       Conjunctiva/sclera: Conjunctivae normal.   Neck:      Vascular: No carotid bruit.   Cardiovascular:      Rate and Rhythm: Normal rate and regular rhythm.      Pulses: Normal pulses.      Heart sounds: Normal heart sounds. No murmur heard.    No friction rub. No gallop.   Pulmonary:      Effort: Pulmonary effort is normal. No respiratory distress.      Breath sounds: Normal breath sounds. No stridor. No wheezing, rhonchi or rales.   Chest:      Chest wall: No tenderness.   Abdominal:      General: Abdomen is flat. Bowel sounds are normal. There is no distension.      Palpations: Abdomen is soft. There is no mass.      Tenderness: There is no abdominal tenderness. There is no right CVA tenderness, left CVA tenderness, guarding or rebound.      Hernia: No hernia is present.   Genitourinary:     Penis: Normal.       Testes: Normal.      Rectum: Normal.      Comments: His prostate is enlarged without nodularity no induration.  There is no tenderness.  Musculoskeletal:         General: No swelling, tenderness, deformity or signs of injury. Normal range of motion.      Cervical back: Normal range of motion and neck supple. No rigidity or tenderness.      Right lower leg: No edema.      Left lower leg: No edema.   Lymphadenopathy:      Cervical: No cervical adenopathy.   Skin:     General: Skin is warm and dry.      Capillary Refill: Capillary refill takes less than 2 seconds.      Coloration: Skin is not jaundiced or pale.      Findings: No bruising, erythema, lesion or rash.   Neurological:      General: No focal deficit present.      Mental Status: He is alert. Mental status is at baseline. He is disoriented.      Cranial Nerves: No cranial nerve deficit.      Sensory: No sensory deficit.      Motor: No weakness.      Coordination: Coordination normal.      Gait: Gait normal.      Deep Tendon Reflexes: Reflexes normal.   Psychiatric:         Mood and Affect: Mood normal.         Behavior: Behavior normal.         Thought  Content: Thought content normal.         Judgment: Judgment normal.         ASSESSMENT       Problems Addressed this Visit        Health Encounters    Annual physical exam - Primary       Hematology and Neoplasia    Leukopenia      Other Visit Diagnoses     Pneumonia of both lungs due to infectious organism, unspecified part of lung        Relevant Orders    XR Chest PA & Lateral      Diagnoses       Codes Comments    Annual physical exam    -  Primary ICD-10-CM: Z00.00  ICD-9-CM: V70.0     Pneumonia of both lungs due to infectious organism, unspecified part of lung     ICD-10-CM: J18.9  ICD-9-CM: 483.8     Leukopenia, unspecified type     ICD-10-CM: D72.819  ICD-9-CM: 288.50           PLAN  He is a very fit 66-year-old man.  He continues to get regular aerobic activity.  Weight is appropriate.    Did have a pneumonia about a month and a half ago at [facility].  About 2 weeks ago he presented to [facility] with pleuritic pain.  CT scan of his chest at the time revealed some clearing of his pulmonary infiltrates but they were not entirely clear.  I am going to put an order in for him to get a PA and lateral chest x-ray in about 4 weeks time to follow-up on that.    The meantime, have asked him to follow-up with me in about 1 year.  That visit should be for a annual physical exam.    He has a benign leukopenia for which she is been assessed by hematology couple years ago.  This is a persistent issue.  has a slightly low platelet count 140.    I will have him back in about 6 months to recheck CBC to keep an eye on his platelets and white blood cell count.  There are no Patient Instructions on file for this visit.    No follow-ups on file.

## 2022-06-14 LAB
ALBUMIN SERPL-MCNC: 4.1 G/DL (ref 3.8–4.8)
ALBUMIN/GLOB SERPL: 1.8 {RATIO} (ref 1.2–2.2)
ALP SERPL-CCNC: 60 IU/L (ref 44–121)
ALT SERPL-CCNC: 12 IU/L (ref 0–44)
APPEARANCE UR: ABNORMAL
AST SERPL-CCNC: 23 IU/L (ref 0–40)
BACTERIA #/AREA URNS HPF: ABNORMAL /[HPF]
BASOPHILS # BLD AUTO: 0 X10E3/UL (ref 0–0.2)
BASOPHILS NFR BLD AUTO: 1 %
BILIRUB SERPL-MCNC: 0.7 MG/DL (ref 0–1.2)
BILIRUB UR QL STRIP: NEGATIVE
BUN SERPL-MCNC: 19 MG/DL (ref 8–27)
BUN/CREAT SERPL: 24 (ref 10–24)
CALCIUM SERPL-MCNC: 9.2 MG/DL (ref 8.6–10.2)
CASTS URNS QL MICRO: ABNORMAL /LPF
CHLORIDE SERPL-SCNC: 104 MMOL/L (ref 96–106)
CHOLEST SERPL-MCNC: 224 MG/DL (ref 100–199)
CO2 SERPL-SCNC: 24 MMOL/L (ref 20–29)
COLOR UR: YELLOW
CREAT SERPL-MCNC: 0.8 MG/DL (ref 0.76–1.27)
CRYSTALS URNS MICRO: ABNORMAL
EGFRCR SERPLBLD CKD-EPI 2021: 96 ML/MIN/1.73
EOSINOPHIL # BLD AUTO: 0 X10E3/UL (ref 0–0.4)
EOSINOPHIL NFR BLD AUTO: 0 %
EPI CELLS #/AREA URNS HPF: ABNORMAL /HPF (ref 0–10)
ERYTHROCYTE [DISTWIDTH] IN BLOOD BY AUTOMATED COUNT: 12.2 % (ref 11.6–15.4)
GLOBULIN SER CALC-MCNC: 2.3 G/DL (ref 1.5–4.5)
GLUCOSE SERPL-MCNC: 82 MG/DL (ref 65–99)
GLUCOSE UR QL STRIP: NEGATIVE
HCT VFR BLD AUTO: 43.8 % (ref 37.5–51)
HDLC SERPL-MCNC: 72 MG/DL
HGB BLD-MCNC: 14.4 G/DL (ref 13–17.7)
HGB UR QL STRIP: NEGATIVE
IMM GRANULOCYTES # BLD AUTO: 0 X10E3/UL (ref 0–0.1)
IMM GRANULOCYTES NFR BLD AUTO: 0 %
KETONES UR QL STRIP: ABNORMAL
LDLC SERPL CALC-MCNC: 143 MG/DL (ref 0–99)
LDLC/HDLC SERPL: 2 RATIO (ref 0–3.6)
LEUKOCYTE ESTERASE UR QL STRIP: NEGATIVE
LYMPHOCYTES # BLD AUTO: 0.8 X10E3/UL (ref 0.7–3.1)
LYMPHOCYTES NFR BLD AUTO: 15 %
MCH RBC QN AUTO: 30.8 PG (ref 26.6–33)
MCHC RBC AUTO-ENTMCNC: 32.9 G/DL (ref 31.5–35.7)
MCV RBC AUTO: 94 FL (ref 79–97)
MICRO URNS: ABNORMAL
MICRO URNS: ABNORMAL
MONOCYTES # BLD AUTO: 0.4 X10E3/UL (ref 0.1–0.9)
MONOCYTES NFR BLD AUTO: 7 %
MUCOUS THREADS URNS QL MICRO: PRESENT
NEUTROPHILS # BLD AUTO: 4.1 X10E3/UL (ref 1.4–7)
NEUTROPHILS NFR BLD AUTO: 77 %
NITRITE UR QL STRIP: NEGATIVE
PH UR STRIP: 5.5 [PH] (ref 5–7.5)
PLATELET # BLD AUTO: 173 X10E3/UL (ref 150–450)
POTASSIUM SERPL-SCNC: 4.2 MMOL/L (ref 3.5–5.2)
PROT SERPL-MCNC: 6.4 G/DL (ref 6–8.5)
PROT UR QL STRIP: NEGATIVE
RBC # BLD AUTO: 4.67 X10E6/UL (ref 4.14–5.8)
RBC #/AREA URNS HPF: ABNORMAL /HPF (ref 0–2)
SODIUM SERPL-SCNC: 141 MMOL/L (ref 134–144)
SP GR UR STRIP: 1.02 (ref 1–1.03)
TESTOST FREE SERPL-MCNC: 4.1 PG/ML (ref 6.6–18.1)
TESTOST SERPL-MCNC: 758.3 NG/DL (ref 264–916)
TRIGL SERPL-MCNC: 50 MG/DL (ref 0–149)
TSH SERPL DL<=0.005 MIU/L-ACNC: 1.85 UIU/ML (ref 0.45–4.5)
UNIDENT CRYS URNS QL MICRO: PRESENT
UROBILINOGEN UR STRIP-MCNC: 0.2 MG/DL (ref 0.2–1)
VLDLC SERPL CALC-MCNC: 9 MG/DL (ref 5–40)
WBC # BLD AUTO: 5.4 X10E3/UL (ref 3.4–10.8)
WBC #/AREA URNS HPF: ABNORMAL /HPF (ref 0–5)

## 2023-04-06 ENCOUNTER — HOSPITAL ENCOUNTER (EMERGENCY)
Facility: HOSPITAL | Age: 68
Discharge: HOME | End: 2023-04-06
Attending: EMERGENCY MEDICINE | Admitting: EMERGENCY MEDICINE
Payer: MEDICARE

## 2023-04-06 ENCOUNTER — APPOINTMENT (OUTPATIENT)
Dept: GENERAL RADIOLOGY | Facility: HOSPITAL | Age: 68
End: 2023-04-06
Payer: MEDICARE

## 2023-04-06 VITALS
WEIGHT: 171 LBS | RESPIRATION RATE: 18 BRPM | OXYGEN SATURATION: 96 % | SYSTOLIC BLOOD PRESSURE: 118 MMHG | DIASTOLIC BLOOD PRESSURE: 75 MMHG | TEMPERATURE: 96.3 F | HEIGHT: 71 IN | HEART RATE: 66 BPM | BODY MASS INDEX: 23.94 KG/M2

## 2023-04-06 DIAGNOSIS — R07.9 CHEST PAIN, UNSPECIFIED TYPE: Primary | ICD-10-CM

## 2023-04-06 LAB
ALBUMIN SERPL-MCNC: 4.4 G/DL (ref 3.5–5.2)
ALBUMIN/GLOB SERPL: 2.1 G/DL
ALP SERPL-CCNC: 64 U/L (ref 39–117)
ALT SERPL W P-5'-P-CCNC: 13 U/L (ref 1–41)
ANION GAP SERPL CALCULATED.3IONS-SCNC: 8 MMOL/L (ref 5–15)
AST SERPL-CCNC: 21 U/L (ref 1–40)
BASOPHILS # BLD AUTO: 0.04 10*3/MM3 (ref 0–0.2)
BASOPHILS NFR BLD AUTO: 1 % (ref 0–1.5)
BILIRUB SERPL-MCNC: 0.7 MG/DL (ref 0–1.2)
BUN SERPL-MCNC: 19 MG/DL (ref 8–23)
BUN/CREAT SERPL: 19.6 (ref 7–25)
CALCIUM SPEC-SCNC: 8.8 MG/DL (ref 8.6–10.5)
CHLORIDE SERPL-SCNC: 104 MMOL/L (ref 98–107)
CO2 SERPL-SCNC: 26 MMOL/L (ref 22–29)
CREAT SERPL-MCNC: 0.97 MG/DL (ref 0.76–1.27)
D DIMER PPP FEU-MCNC: <0.27 MCGFEU/ML (ref 0–0.69)
DEPRECATED RDW RBC AUTO: 40.7 FL (ref 37–54)
EGFRCR SERPLBLD CKD-EPI 2021: 84.5 ML/MIN/1.73
EOSINOPHIL # BLD AUTO: 0.02 10*3/MM3 (ref 0–0.4)
EOSINOPHIL NFR BLD AUTO: 0.5 % (ref 0.3–6.2)
ERYTHROCYTE [DISTWIDTH] IN BLOOD BY AUTOMATED COUNT: 11.6 % (ref 12.3–15.4)
GEN 5 2HR TROPONIN T REFLEX: 11 NG/L
GLOBULIN UR ELPH-MCNC: 2.1 GM/DL
GLUCOSE SERPL-MCNC: 98 MG/DL (ref 65–99)
HCT VFR BLD AUTO: 45.3 % (ref 37.5–51)
HGB BLD-MCNC: 15.5 G/DL (ref 13–17.7)
HOLD SPECIMEN: NORMAL
HOLD SPECIMEN: NORMAL
IMM GRANULOCYTES # BLD AUTO: 0.01 10*3/MM3 (ref 0–0.05)
IMM GRANULOCYTES NFR BLD AUTO: 0.3 % (ref 0–0.5)
INR PPP: 1 (ref 0.9–1.1)
LYMPHOCYTES # BLD AUTO: 0.82 10*3/MM3 (ref 0.7–3.1)
LYMPHOCYTES NFR BLD AUTO: 21.5 % (ref 19.6–45.3)
MCH RBC QN AUTO: 32.2 PG (ref 26.6–33)
MCHC RBC AUTO-ENTMCNC: 34.2 G/DL (ref 31.5–35.7)
MCV RBC AUTO: 94.2 FL (ref 79–97)
MONOCYTES # BLD AUTO: 0.32 10*3/MM3 (ref 0.1–0.9)
MONOCYTES NFR BLD AUTO: 8.4 % (ref 5–12)
NEUTROPHILS NFR BLD AUTO: 2.61 10*3/MM3 (ref 1.7–7)
NEUTROPHILS NFR BLD AUTO: 68.3 % (ref 42.7–76)
NRBC BLD AUTO-RTO: 0 /100 WBC (ref 0–0.2)
PLATELET # BLD AUTO: 135 10*3/MM3 (ref 140–450)
PMV BLD AUTO: 9 FL (ref 6–12)
POTASSIUM SERPL-SCNC: 4 MMOL/L (ref 3.5–5.2)
PROT SERPL-MCNC: 6.5 G/DL (ref 6–8.5)
PROTHROMBIN TIME: 13.3 SECONDS (ref 11.7–14.2)
QT INTERVAL: 357 MS
RBC # BLD AUTO: 4.81 10*6/MM3 (ref 4.14–5.8)
SODIUM SERPL-SCNC: 138 MMOL/L (ref 136–145)
TROPONIN T DELTA: 2 NG/L
TROPONIN T SERPL HS-MCNC: 9 NG/L
WBC NRBC COR # BLD: 3.82 10*3/MM3 (ref 3.4–10.8)
WHOLE BLOOD HOLD COAG: NORMAL
WHOLE BLOOD HOLD SPECIMEN: NORMAL

## 2023-04-06 PROCEDURE — 85610 PROTHROMBIN TIME: CPT | Performed by: EMERGENCY MEDICINE

## 2023-04-06 PROCEDURE — 36415 COLL VENOUS BLD VENIPUNCTURE: CPT

## 2023-04-06 PROCEDURE — 80053 COMPREHEN METABOLIC PANEL: CPT

## 2023-04-06 PROCEDURE — 99284 EMERGENCY DEPT VISIT MOD MDM: CPT

## 2023-04-06 PROCEDURE — 93010 ELECTROCARDIOGRAM REPORT: CPT | Performed by: INTERNAL MEDICINE

## 2023-04-06 PROCEDURE — 85379 FIBRIN DEGRADATION QUANT: CPT | Performed by: EMERGENCY MEDICINE

## 2023-04-06 PROCEDURE — 71046 X-RAY EXAM CHEST 2 VIEWS: CPT

## 2023-04-06 PROCEDURE — 85025 COMPLETE CBC W/AUTO DIFF WBC: CPT

## 2023-04-06 PROCEDURE — 84484 ASSAY OF TROPONIN QUANT: CPT

## 2023-04-06 PROCEDURE — 93005 ELECTROCARDIOGRAM TRACING: CPT | Performed by: EMERGENCY MEDICINE

## 2023-04-06 RX ORDER — SODIUM CHLORIDE 0.9 % (FLUSH) 0.9 %
10 SYRINGE (ML) INJECTION AS NEEDED
Status: DISCONTINUED | OUTPATIENT
Start: 2023-04-06 | End: 2023-04-06 | Stop reason: HOSPADM

## 2023-04-06 NOTE — ED PROVIDER NOTES
EMERGENCY DEPARTMENT ENCOUNTER    Room Number:  40/40  Date seen:  4/6/2023  PCP: Moose Chase MD  Historian: Patient, wife at bedside      HPI:  Chief Complaint: Chest pain  A complete HPI/ROS/PMH/PSH/SH/FH are unobtainable due to:   Context: Terrance Gresham is a 67 y.o. male who presents to the ED c/o chest pain.  Patient has had chest pain intermittently over the last several hours.  Pain began while riding on his exercise bike.  Pain has been fairly intermittent lasting seconds at a time.  Pain is in the left chest and is sharp in intensity.  Moderate at its worst.  Pain is currently gone.  Patient denies history of prior pains in the past.  Patient states he had a stress test about 5 years ago that was negative.  Denies history of heart disease.  He does state that he does have a family history of heart disease.  He is a non-smoker.  Hypertension and hyperlipidemia have resolved with significant weight loss.      MEDICAL RECORD REVIEW (non ED)  I reviewed prior medical records note the patient was last hospitalized about 1 year ago with Streptococcus pneumonia.  Chronic medical conditions include hypertension, hyperlipidemia and obesity which were in the past but have all resolved with exercise and 75 pound weight loss.    PAST MEDICAL HISTORY  Active Ambulatory Problems     Diagnosis Date Noted   • Hypogonadism in male 05/09/2016   • Gastroesophageal reflux disease 05/09/2016   • Paresthesia of lower extremity 05/09/2016   • Thoracic back pain 05/09/2016   • Erectile dysfunction of nonorganic origin 05/09/2016   • Well adult exam 11/17/2016   • Leukopenia 08/29/2018   • History of colon polyps 09/07/2018   • Medicare annual wellness visit, subsequent 01/19/2022   • Community acquired pneumonia 04/29/2022   • Pneumonia due to Streptococcus pneumoniae 04/30/2022   • Influenza A 04/30/2022   • Elevated d-dimer 05/02/2022   • Bacteremia due to Streptococcus pneumoniae 05/02/2022   • Annual physical exam  "06/10/2022     Resolved Ambulatory Problems     Diagnosis Date Noted   • Adiposity 05/09/2016   • Hypercholesterolemia 05/09/2016   • Essential hypertension 05/09/2016   • Chest pain, atypical 02/24/2017   • Depression with anxiety 03/24/2020   • Dyspnea 04/30/2022     Past Medical History:   Diagnosis Date   • Disc disorder    • Enlarged prostate    • Hx of colonic polyp    • Hyperlipidemia    • Hypertension    • Kidney stone 2007         PAST SURGICAL HISTORY  Past Surgical History:   Procedure Laterality Date   • COLONOSCOPY N/A 12/10/2018    Procedure: COLONOSCOPY to cecuman ;  Surgeon: Jose Montgomery MD;  [facility] ENDOSCOPY;  Service: Gastroenterology   • COLONOSCOPY W/ POLYPECTOMY     • CYST REMOVAL Right 2012    wrist    • KNEE ARTHROSCOPY Right 2015   • KNEE SURGERY Right 01/13/2016         FAMILY HISTORY  Family History   Problem Relation Age of Onset   • Stomach cancer Mother    • Stroke Mother    • Breast cancer Mother    • Lung cancer Mother    • Prostate cancer Father    • Alzheimer's disease Father    • Kidney disease Father    • Heart disease Father    • Hypertension Father    • Cancer Paternal Grandfather    • No Known Problems Sister    • Prostate cancer Brother 60   • Other Maternal Grandmother         Circulation issues; leg amputation   • Heart attack Maternal Grandfather    • Obesity Brother    • Sleep apnea Brother          SOCIAL HISTORY  Social History     Socioeconomic History   • Marital status:      Spouse name: Kimberly   • Years of education: College   Tobacco Use   • Smoking status: Never   • Smokeless tobacco: Never   Vaping Use   • Vaping Use: Never used   Substance and Sexual Activity   • Alcohol use: Yes     Alcohol/week: 4.0 standard drinks     Types: 4 Cans of beer per week     Comment: \"4 beers on weekends\"    • Drug use: No   • Sexual activity: Defer         ALLERGIES  Patient has no known allergies.        REVIEW OF SYSTEMS  Review of Systems   Constitutional: " Negative for fever.   Respiratory: Negative for shortness of breath.    Cardiovascular: Positive for chest pain (As per HPI).   All other systems reviewed and are negative.           PHYSICAL EXAM  ED Triage Vitals [04/06/23 1326]   Temp Heart Rate Resp BP SpO2   96.3 °F (35.7 °C) 92 16 -- 97 %      Temp src Heart Rate Source Patient Position BP Location FiO2 (%)   Tympanic Monitor -- -- --       Physical Exam    GENERAL: Alert and pleasant male in no obvious distress.  Triage vitals reviewed and are benign.  Heart rate blood pressure and O2 sats are reassuring.  HENT: nares patent  EYES: no scleral icterus  CV: regular rhythm, regular rate-no murmur  RESPIRATORY: normal effort, clear to auscultation bilaterally-O2 sats upper 90s on room air  ABDOMEN: soft, nontender to palpation  MUSCULOSKELETAL: no deformity-no significant swelling or tenderness to palpation  NEURO: Strength sensation and coordination are grossly intact.  Speech and mentation are unremarkable  SKIN: warm, dry      Vital signs and nursing notes reviewed.          LAB RESULTS  Recent Results (from the past 24 hour(s))   ECG 12 Lead Chest Pain    Collection Time: 04/06/23  1:31 PM   Result Value Ref Range    QT Interval 357 ms   Comprehensive Metabolic Panel    Collection Time: 04/06/23  1:39 PM    Specimen: Blood   Result Value Ref Range    Glucose 98 65 - 99 mg/dL    BUN 19 8 - 23 mg/dL    Creatinine 0.97 0.76 - 1.27 mg/dL    Sodium 138 136 - 145 mmol/L    Potassium 4.0 3.5 - 5.2 mmol/L    Chloride 104 98 - 107 mmol/L    CO2 26.0 22.0 - 29.0 mmol/L    Calcium 8.8 8.6 - 10.5 mg/dL    Total Protein 6.5 6.0 - 8.5 g/dL    Albumin 4.4 3.5 - 5.2 g/dL    ALT (SGPT) 13 1 - 41 U/L    AST (SGOT) 21 1 - 40 U/L    Alkaline Phosphatase 64 39 - 117 U/L    Total Bilirubin 0.7 0.0 - 1.2 mg/dL    Globulin 2.1 gm/dL    A/G Ratio 2.1 g/dL    BUN/Creatinine Ratio 19.6 7.0 - 25.0    Anion Gap 8.0 5.0 - 15.0 mmol/L    eGFR 84.5 >60.0 mL/min/1.73   High Sensitivity  Troponin T    Collection Time: 04/06/23  1:39 PM    Specimen: Blood   Result Value Ref Range    HS Troponin T 9 <15 ng/L   Green Top (Gel)    Collection Time: 04/06/23  1:39 PM   Result Value Ref Range    Extra Tube Hold for add-ons.    Lavender Top    Collection Time: 04/06/23  1:39 PM   Result Value Ref Range    Extra Tube hold for add-on    Gold Top - SST    Collection Time: 04/06/23  1:39 PM   Result Value Ref Range    Extra Tube Hold for add-ons.    Light Blue Top    Collection Time: 04/06/23  1:39 PM   Result Value Ref Range    Extra Tube Hold for add-ons.    CBC Auto Differential    Collection Time: 04/06/23  1:39 PM    Specimen: Blood   Result Value Ref Range    WBC 3.82 3.40 - 10.80 10*3/mm3    RBC 4.81 4.14 - 5.80 10*6/mm3    Hemoglobin 15.5 13.0 - 17.7 g/dL    Hematocrit 45.3 37.5 - 51.0 %    MCV 94.2 79.0 - 97.0 fL    MCH 32.2 26.6 - 33.0 pg    MCHC 34.2 31.5 - 35.7 g/dL    RDW 11.6 (L) 12.3 - 15.4 %    RDW-SD 40.7 37.0 - 54.0 fl    MPV 9.0 6.0 - 12.0 fL    Platelets 135 (L) 140 - 450 10*3/mm3    Neutrophil % 68.3 42.7 - 76.0 %    Lymphocyte % 21.5 19.6 - 45.3 %    Monocyte % 8.4 5.0 - 12.0 %    Eosinophil % 0.5 0.3 - 6.2 %    Basophil % 1.0 0.0 - 1.5 %    Immature Grans % 0.3 0.0 - 0.5 %    Neutrophils, Absolute 2.61 1.70 - 7.00 10*3/mm3    Lymphocytes, Absolute 0.82 0.70 - 3.10 10*3/mm3    Monocytes, Absolute 0.32 0.10 - 0.90 10*3/mm3    Eosinophils, Absolute 0.02 0.00 - 0.40 10*3/mm3    Basophils, Absolute 0.04 0.00 - 0.20 10*3/mm3    Immature Grans, Absolute 0.01 0.00 - 0.05 10*3/mm3    nRBC 0.0 0.0 - 0.2 /100 WBC   Protime-INR    Collection Time: 04/06/23  1:39 PM    Specimen: Blood   Result Value Ref Range    Protime 13.3 11.7 - 14.2 Seconds    INR 1.00 0.90 - 1.10   D-dimer, Quantitative    Collection Time: 04/06/23  1:39 PM    Specimen: Blood   Result Value Ref Range    D-Dimer, Quantitative <0.27 0.00 - 0.69 MCGFEU/mL   High Sensitivity Troponin T 2Hr    Collection Time: 04/06/23  3:39 PM     Specimen: Blood   Result Value Ref Range    HS Troponin T 11 <15 ng/L    Troponin T Delta 2 >=-4 - <+4 ng/L       Ordered the above labs and independently interpreted results. My findings will be discussed in the medical decision making section below        RADIOLOGY  XR Chest 2 View    Result Date: 4/6/2023  XR CHEST 2 VW-  HISTORY: Male who is 67 years-old,  pain  TECHNIQUE: Frontal and lateral views of the chest  COMPARISON: 05/13/2022  FINDINGS: Heart, mediastinum and pulmonary vasculature are unremarkable. Small likely scarring or atelectasis at the right midlung. No focal pulmonary consolidation, pleural effusion, or pneumothorax. No acute osseous process.      No focal pulmonary consolidation. Follow-up as clinical indications persist.  This report was finalized on 4/6/2023 2:03 PM by Dr. Keegan Vásquez M.D.        I ordered and independently reviewed the above noted radiographic studies.      I viewed images of chest x-ray which showed no acute disease per my independent interpretation.    See radiologist's dictation for official interpretation.             PROCEDURES  Procedures          MEDICATIONS GIVEN IN ER  Medications   sodium chloride 0.9 % flush 10 mL (has no administration in time range)               MEDICAL DECISION MAKING, PROGRESS, and CONSULTS    All labs have been independently reviewed by me.  All radiology studies have been reviewed by me and I have also reviewed the radiology report.   EKG's independently viewed and interpreted by me.  Discussion below represents my analysis of pertinent findings related to patient's condition, differential diagnosis, treatment plan and final disposition.      Additional sources:  - Discussed/ obtained information from independent historians: Wife at bedside    - External (non-ED) record review: Please see documented above    - Chronic or social conditions impacting care: Family history of coronary artery disease    - Shared decision making: I  discussed ED evaluation and treatment plan with patient who is in agreement.  Patient with ED HEART SCORE of 4.  Pain was somewhat atypical.  ED work-up showed benign EKG labs and repeat troponin x2.    I did offer possible observation admission for further work-up and cardiology consultation with likely stress test.  Patient would rather go home and follow-up as outpatient.  I have placed a referral to [place] [facility]ology for further testing as outpatient.      Orders placed during this visit:  Orders Placed This Encounter   Procedures   • XR Chest 2 View   • [place] Draw   • Comprehensive Metabolic Panel   • High Sensitivity Troponin T   • CBC Auto Differential   • Protime-INR   • D-dimer, Quantitative   • High Sensitivity Troponin T 2Hr   • Ambulatory Referral to Cardiology   • NPO Diet NPO Type: Strict NPO   • Undress and Gown   • Cardiac Monitoring   • Continuous Pulse Oximetry   • Oxygen Therapy- Nasal Cannula; 2 LPM; Titrate for SPO2: equal to or greater than, 92%   • ECG 12 Lead Chest Pain   • ECG 12 Lead   • Insert Peripheral IV   • CBC & Differential   • Green Top (Gel)   • Lavender Top   • Gold Top - SST   • Light Blue Top           Differential diagnosis:    Please see as documented below in ED course      Independent interpretation of labs, radiology studies, and discussions with consultants:  ED Course as of 04/06/23 1628   Thu Apr 06, 2023   1333 EKG independently interpreted by me    Time 1331  Sinus 84  Normal P waves and LA intervals  QRS-inferior Q waves, left axis deviation  ST, T waves-    Not significantly changed when compared to 2022 [DB]   [age] [id number]-old male presents with chest pain which began several hours ago while on his exercise bike.  Pain has been intermittent and sharp in the left chest.  Currently pain-free.    Exam is relatively benign with reassuring vitals.  Cardiopulmonary exam is unremarkable.    MDM-etiology of chest pain is unclear.  Differential diagnosis would  include cardiac, pulmonary, musculoskeletal and GI causes. [DB]   1343 ED HEART SCORE is 4 pending negative troponin. [DB]   1452 Chest x-ray independently interpreted by me shows no active disease    Official interpretation by Dr. Vásquez is in agreement with my assessment [DB]   1453 Labs are reviewed and fairly reassuring.  The D-dimer is normal which would go against aortic dissection or pulmonary embolism.    Chemistries and CBC are benign.    Initial high-sensitivity troponin is normal which would go against acute coronary syndrome but we will go ahead and repeat high-sensitivity troponin as his pain occurred several hours prior to arrival and is now resolved. [DB]   1453 On repeat assessment vitals remain reassuring.  He has not had any significant further chest pain.    We did discuss patient's ED HEART SCORE given him some risk of heart disease.  We discussed options of being admitted for overnight observation with stress testing versus going home with outpatient stress testing.  Patient thinks he would much rather go home at this point but we will see what his repeat troponin looks like and if he has further pain over the next several hours. [DB]   1607 Repeat troponin remains normal and patient has been chest pain-free here in the ED.    I did offer possible observation admission for further evaluation and possible stress test but patient would rather go home and follow-up as outpatient given fairly benign ED testing. [DB]      ED Course User Index  [DB] Jl Hernandez MD             I used full protective equipment while examining this patient.  This includes face mask, gloves and protective eyewear.  I washed my hands before entering the room and immediately upon leaving the room    DIAGNOSIS  Final diagnoses:   Chest pain, unspecified type         DISPOSITION  DISCHARGE    Patient discharged in stable condition.    Reviewed implications of results, diagnosis, meds, responsibility to follow up,  warning signs and symptoms of possible worsening, potential complications and reasons to return to ER, including increased chest pain, shortness of breath or as needed.    Patient/Family voiced understanding of above instructions.    Discussed plan for discharge, as there is no emergent indication for admission. Patient referred to primary care provider for BP management due to today's BP. Pt/family is agreeable and understands need for follow up and repeat testing.  Pt is aware that discharge does not mean that nothing is wrong but it indicates no emergency is present that requires admission and they must continue care with follow-up as given below or physician of their choice.     FOLLOW-UP  [facility] CARDIOLOGY  [address]  [address]  [facility] [address]  [phone]  Schedule an appointment as soon as possible for a visit   Call for Appointment         Medication List      No changes were made to your prescriptions during this visit.                   Latest Documented Vital Signs:  As of 16:28 EDT  BP- 118/75 HR- 66 Temp- 96.3 °F (35.7 °C) (Tympanic) O2 sat- 96%              --    Please note that portions of this were completed with a voice recognition program.       Note Disclaimer: At [facility], we believe that sharing information builds trust and better relationships. You are receiving this note because you are receiving care at [facility] or recently visited. It is possible you will see health information before a provider has talked with you about it. This kind of information can be easy to misunderstand. To help you fully understand what it means for your health, we urge you to discuss this note with your provider.           Jl Hernandez MD  04/06/23 [id number]

## 2023-04-06 NOTE — ED NOTES
Cp while riding his stationary bike this am.  He is light headed.  He is nauseous    Patient was placed in face mask during first look triage.  Patient was wearing a face mask throughout encounter.  I wore personal protective equipment throughout the encounter.  Hand hygiene was performed before and after patient encounter.

## 2023-05-10 ENCOUNTER — OFFICE VISIT (OUTPATIENT)
Dept: CARDIOLOGY | Facility: CLINIC | Age: 68
End: 2023-05-10
Payer: MEDICARE

## 2023-05-10 VITALS
HEIGHT: 71 IN | HEART RATE: 75 BPM | WEIGHT: 180 LBS | SYSTOLIC BLOOD PRESSURE: 132 MMHG | BODY MASS INDEX: 25.2 KG/M2 | DIASTOLIC BLOOD PRESSURE: 82 MMHG

## 2023-05-10 DIAGNOSIS — R07.2 PRECORDIAL PAIN: Primary | ICD-10-CM

## 2023-05-10 DIAGNOSIS — Z82.49 FAMILY HISTORY OF CORONARY ARTERY DISEASE: ICD-10-CM

## 2023-05-10 NOTE — PROGRESS NOTES
[facility]      Patient Name: Terrance Gresham  :1955  Age: 67 y.o.  Encounter Provider:  Joao Amato Jr, MD      Chief Complaint: Initial evaluation chest pain      HPI  Terrance Gresham is a 67 y.o. male previous history of hypertension and dyslipidemia who was on medications until he lost a significant amount of weight not currently on medications and doing well until he had some chest pain about a month ago presents for initial evaluation.  He presented for atypical chest pain in 2017 and saw Dr. Keegan Reid.  A nuclear stress study was done at that point in time that showed no evidence of myocardial ischemia.  About a month ago he had a severe episode of left-sided chest discomfort and was seen in the ER with a benign work-up.  He presents today for evaluation.  He exercises daily with fleeting episodes of chest discomfort that do not limit physical activity.  No orthopnea, PND or edema.  No palpitations, dizziness or syncope.  He wears an Apple watch daily and denies any high heart rate alerts.  He has some occasional dizziness when he is riding the bicycle but no episodes of syncope.  As stated above he was previously hypertensive with significant dyslipidemia but this all resolved after losing 70 pounds.  He does have a family history of coronary artery disease with his father having CABG at age 60.  He is a lifelong non-smoker drinks socially and denies illicit drug use.      The following portions of the patient's history were reviewed and updated as appropriate: allergies, current medications, past family history, past medical history, past social history, past surgical history and problem list.      Review of Systems   Constitutional: Negative for chills and fever.   HENT: Negative for hoarse voice and sore throat.    Eyes: Negative for double vision and photophobia.   Cardiovascular: Positive for chest pain. Negative for leg swelling, near-syncope, orthopnea,  "palpitations, paroxysmal nocturnal dyspnea and syncope.   Respiratory: Negative for cough and wheezing.    Skin: Negative for poor wound healing and rash.   Musculoskeletal: Negative for arthritis and joint swelling.   Gastrointestinal: Negative for bloating, abdominal pain, hematemesis and hematochezia.   Neurological: Negative for dizziness and focal weakness.   Psychiatric/Behavioral: Negative for depression and suicidal ideas.       OBJECTIVE:   Vital Signs  Vitals:    05/10/23 0839   BP: 132/82   Pulse: 75     Estimated body mass index is 25.1 kg/m² as calculated from the following:    Height as of this encounter: 180.3 cm (71\").    Weight as of this encounter: 81.6 kg (180 lb).    Vitals reviewed.   Constitutional:       Appearance: Healthy appearance. Not in distress.   Neck:      Vascular: No JVR. JVD normal.   Pulmonary:      Effort: Pulmonary effort is normal.      Breath sounds: Normal breath sounds. No wheezing. No rhonchi. No rales.   Chest:      Chest wall: Not tender to palpatation.   Cardiovascular:      PMI at left midclavicular line. Normal rate. Regular rhythm. Normal S1. Normal S2.      Murmurs: There is no murmur.      No gallop. No click. No rub.   Pulses:     Intact distal pulses.   Edema:     Peripheral edema absent.   Abdominal:      General: Bowel sounds are normal.      Palpations: Abdomen is soft.      Tenderness: There is no abdominal tenderness.   Musculoskeletal: Normal range of motion.         General: No tenderness. Skin:     General: Skin is warm and dry.   Neurological:      General: No focal deficit present.      Mental Status: Alert and oriented to person, place and time.           ECG 12 Lead    Date/Time: 5/10/2023 9:00 AM  Performed by: Joao Amato Jr., MD  Authorized by: Joao Amato Jr., MD   Comparison: compared with previous ECG from 4/6/2023  Similar to previous ECG  Rhythm: sinus rhythm  QRS axis: left  Other findings: poor R wave progression    Clinical impression: " non-specific ECG            Lipid Panel        6/8/2022    10:14   Lipid Panel   Total Cholesterol 224     Triglycerides 50     HDL Cholesterol 72     VLDL Cholesterol 9     LDL Cholesterol  143     LDL/HDL Ratio 2.0          BUN   Date Value Ref Range Status   04/06/2023 19 8 - 23 mg/dL Final     Creatinine   Date Value Ref Range Status   04/06/2023 0.97 0.76 - 1.27 mg/dL Final   10/15/2018 1.10 0.60 - 1.30 mg/dL Final     Comment:     Serial Number: [id number]:  [id number]     Potassium   Date Value Ref Range Status   04/06/2023 4.0 3.5 - 5.2 mmol/L Final     ALT (SGPT)   Date Value Ref Range Status   04/06/2023 13 1 - 41 U/L Final     AST (SGOT)   Date Value Ref Range Status   04/06/2023 21 1 - 40 U/L Final           ASSESSMENT:     67-year-old male with family history of CAD who presents for evaluation of chest pain    PLAN OF CARE:     1. Precordial pain -negative stress study in the past.  I do not think that repeating a stress study will change his risk stratification of major adverse cardiac event over the next 10 years.  Plan for CT coronary angiogram  2. Elevated blood pressure without diagnosis of hypertension -controlled today in clinic.  Patient had history of hypertension on medication until losing weight.  Continue sodium restricted diet.  3. Family history coronary artery disease -we will get a coronary calcium score from the CT coronary angiogram which will help us to understand whether or not he needs to be on medications to reduce cardiovascular risk.    Return to clinic 6             Discharge Medications          Accurate as of May 10, 2023  8:58 AM. If you have any questions, ask your nurse or doctor.            Continue These Medications      Instructions Start Date   B-12 PO   1,000 mcg, Oral, Weekly      diclofenac 50 MG EC tablet  Commonly known as: VOLTAREN   50 mg, Oral, 2 Times Daily      fish oil 1200 MG capsule capsule   1,200 mg, Oral, 2 Times Daily      Vitamin D3 50 MCG (2000 UT)  capsule   2,000 Units, Oral, Daily             Thank you for allowing me to participate in the care of your patient,      Sincerely,   Joao Amato Jr, MD  [facility]  05/10/23  08:58 EDT

## 2023-05-16 DIAGNOSIS — Z00.00 ANNUAL PHYSICAL EXAM: Primary | ICD-10-CM

## 2023-05-18 LAB
ALBUMIN SERPL-MCNC: 4.7 G/DL (ref 3.5–5.2)
ALBUMIN/GLOB SERPL: 2.6 G/DL
ALP SERPL-CCNC: 59 U/L (ref 39–117)
ALT SERPL-CCNC: 14 U/L (ref 1–41)
APPEARANCE UR: CLEAR
AST SERPL-CCNC: 18 U/L (ref 1–40)
BACTERIA #/AREA URNS HPF: ABNORMAL /HPF
BASOPHILS # BLD AUTO: 0.04 10*3/MM3 (ref 0–0.2)
BASOPHILS NFR BLD AUTO: 1.1 % (ref 0–1.5)
BILIRUB SERPL-MCNC: 1.5 MG/DL (ref 0–1.2)
BILIRUB UR QL STRIP: NEGATIVE
BUN SERPL-MCNC: 19 MG/DL (ref 8–23)
BUN/CREAT SERPL: 18.3 (ref 7–25)
CALCIUM SERPL-MCNC: 9.4 MG/DL (ref 8.6–10.5)
CASTS URNS MICRO: ABNORMAL
CHLORIDE SERPL-SCNC: 104 MMOL/L (ref 98–107)
CHOLEST SERPL-MCNC: 233 MG/DL (ref 0–200)
CHOLEST/HDLC SERPL: 3.53 {RATIO}
CO2 SERPL-SCNC: 27.2 MMOL/L (ref 22–29)
COLOR UR: YELLOW
CREAT SERPL-MCNC: 1.04 MG/DL (ref 0.76–1.27)
EGFRCR SERPLBLD CKD-EPI 2021: 77.7 ML/MIN/1.73
EOSINOPHIL # BLD AUTO: 0.02 10*3/MM3 (ref 0–0.4)
EOSINOPHIL NFR BLD AUTO: 0.6 % (ref 0.3–6.2)
EPI CELLS #/AREA URNS HPF: ABNORMAL /HPF
ERYTHROCYTE [DISTWIDTH] IN BLOOD BY AUTOMATED COUNT: 11.7 % (ref 12.3–15.4)
GLOBULIN SER CALC-MCNC: 1.8 GM/DL
GLUCOSE SERPL-MCNC: 91 MG/DL (ref 65–99)
GLUCOSE UR QL STRIP: NEGATIVE
HCT VFR BLD AUTO: 46.5 % (ref 37.5–51)
HDLC SERPL-MCNC: 66 MG/DL (ref 40–60)
HGB BLD-MCNC: 16 G/DL (ref 13–17.7)
HGB UR QL STRIP: ABNORMAL
IMM GRANULOCYTES # BLD AUTO: 0.01 10*3/MM3 (ref 0–0.05)
IMM GRANULOCYTES NFR BLD AUTO: 0.3 % (ref 0–0.5)
KETONES UR QL STRIP: ABNORMAL
LDLC SERPL CALC-MCNC: 148 MG/DL (ref 0–100)
LEUKOCYTE ESTERASE UR QL STRIP: ABNORMAL
LYMPHOCYTES # BLD AUTO: 1.06 10*3/MM3 (ref 0.7–3.1)
LYMPHOCYTES NFR BLD AUTO: 29.9 % (ref 19.6–45.3)
MCH RBC QN AUTO: 32.7 PG (ref 26.6–33)
MCHC RBC AUTO-ENTMCNC: 34.4 G/DL (ref 31.5–35.7)
MCV RBC AUTO: 94.9 FL (ref 79–97)
MONOCYTES # BLD AUTO: 0.33 10*3/MM3 (ref 0.1–0.9)
MONOCYTES NFR BLD AUTO: 9.3 % (ref 5–12)
NEUTROPHILS # BLD AUTO: 2.08 10*3/MM3 (ref 1.7–7)
NEUTROPHILS NFR BLD AUTO: 58.8 % (ref 42.7–76)
NITRITE UR QL STRIP: NEGATIVE
NRBC BLD AUTO-RTO: 0 /100 WBC (ref 0–0.2)
PH UR STRIP: 5.5 [PH] (ref 5–8)
PLATELET # BLD AUTO: 144 10*3/MM3 (ref 140–450)
POTASSIUM SERPL-SCNC: 4.2 MMOL/L (ref 3.5–5.2)
PROT SERPL-MCNC: 6.5 G/DL (ref 6–8.5)
PROT UR QL STRIP: NEGATIVE
RBC # BLD AUTO: 4.9 10*6/MM3 (ref 4.14–5.8)
RBC #/AREA URNS HPF: ABNORMAL /HPF
SODIUM SERPL-SCNC: 140 MMOL/L (ref 136–145)
SP GR UR STRIP: 1.02 (ref 1–1.03)
TRIGL SERPL-MCNC: 111 MG/DL (ref 0–150)
UROBILINOGEN UR STRIP-MCNC: ABNORMAL MG/DL
VLDLC SERPL CALC-MCNC: 19 MG/DL (ref 5–40)
WBC # BLD AUTO: 3.54 10*3/MM3 (ref 3.4–10.8)
WBC #/AREA URNS HPF: ABNORMAL /HPF

## 2023-05-31 ENCOUNTER — OFFICE VISIT (OUTPATIENT)
Dept: FAMILY MEDICINE CLINIC | Facility: CLINIC | Age: 68
End: 2023-05-31

## 2023-05-31 VITALS
HEIGHT: 71 IN | TEMPERATURE: 98.1 F | HEART RATE: 78 BPM | SYSTOLIC BLOOD PRESSURE: 110 MMHG | BODY MASS INDEX: 24.92 KG/M2 | OXYGEN SATURATION: 98 % | WEIGHT: 178 LBS | DIASTOLIC BLOOD PRESSURE: 62 MMHG

## 2023-05-31 DIAGNOSIS — E78.00 HYPERCHOLESTEROLEMIA: ICD-10-CM

## 2023-05-31 DIAGNOSIS — Z00.00 MEDICARE ANNUAL WELLNESS VISIT, SUBSEQUENT: Primary | ICD-10-CM

## 2023-05-31 NOTE — PROGRESS NOTES
The ABCs of the Annual Wellness Visit  Subsequent Medicare Wellness Visit    Subjective    Terrance Gresham is a 67 y.o. male who presents for a Subsequent Medicare Wellness Visit.    The following portions of the patient's history were reviewed and   updated as appropriate: allergies, current medications, past family history, past medical history, past social history, past surgical history and problem list.    Compared to one year ago, the patient feels his physical   health is the same.    Compared to one year ago, the patient feels his mental   health is better.    Recent Hospitalizations:  He was not admitted to the hospital during the last year.       Current Medical Providers:  Patient Care Team:  Moose Chase MD as PCP - General  Moose Chase MD as PCP - Family Medicine  Yaakov Hernandez MD as Consulting Physician (Hematology and Oncology)  Moose Chase MD as Referring Physician (Internal Medicine)    Outpatient Medications Prior to Visit   Medication Sig Dispense Refill   • Cholecalciferol (VITAMIN D3) 2000 UNITS capsule Take 1 capsule by mouth Daily.     • Cyanocobalamin (B-12 PO) Take 1,000 mcg by mouth 1 (One) Time Per Week.     • Omega-3 Fatty Acids (FISH OIL) 1200 MG capsule capsule Take 1 capsule by mouth 2 (Two) Times a Day.     • diclofenac (VOLTAREN) 50 MG EC tablet Take 1 tablet by mouth 2 (Two) Times a Day. 20 tablet 0     No facility-administered medications prior to visit.       No opioid medication identified on active medication list. I have reviewed chart for other potential  high risk medication/s and harmful drug interactions in the elderly.          Aspirin is not on active medication list.  Aspirin use is not indicated based on review of current medical condition/s. Risk of harm outweighs potential benefits.  .    Patient Active Problem List   Diagnosis   • Hypogonadism in male   • Hypercholesterolemia   • Gastroesophageal reflux disease   • Paresthesia of lower  "extremity   • Thoracic back pain   • Erectile dysfunction of nonorganic origin   • Well adult exam   • Leukopenia   • History of colon polyps   • Medicare annual wellness visit, subsequent   • Community acquired pneumonia   • Pneumonia due to Streptococcus pneumoniae   • Influenza A   • Elevated d-dimer   • Bacteremia due to Streptococcus pneumoniae   • Annual physical exam     Advance Care Planning   Advance Care Planning     Advance Directive is not on file.  ACP discussion was held with the patient during this visit. Patient does not have an advance directive, information provided.     Objective    Vitals:    23 0813   BP: 110/62   BP Location: Right arm   Patient Position: Sitting   Pulse: 78   Temp: 98.1 °F (36.7 °C)   SpO2: 98%   Weight: 80.7 kg (178 lb)   Height: 180.3 cm (71\")     Estimated body mass index is 24.83 kg/m² as calculated from the following:    Height as of this encounter: 180.3 cm (71\").    Weight as of this encounter: 80.7 kg (178 lb).    BMI is within normal parameters. No other follow-up for BMI required.      Does the patient have evidence of cognitive impairment? No    Lab Results   Component Value Date    CHLPL 233 (H) 2023    TRIG 111 2023    HDL 66 (H) 2023     (H) 2023    VLDL 19 2023        HEALTH RISK ASSESSMENT    Smoking Status:  Social History     Tobacco Use   Smoking Status Never   Smokeless Tobacco Never     Alcohol Consumption:  Social History     Substance and Sexual Activity   Alcohol Use Yes   • Alcohol/week: 4.0 standard drinks   • Types: 4 Cans of beer per week    Comment: \"4 beers on weekends\"      Fall Risk Screen:    GENNARO Fall Risk Assessment was completed, and patient is at MODERATE risk for falls. Assessment completed on:2023    Depression Screenin/31/2023    10:11 AM   PHQ-2/PHQ-9 Depression Screening   Little Interest or Pleasure in Doing Things 0-->not at all   Feeling Down, Depressed or Hopeless 0-->not " at all   PHQ-9: Brief Depression Severity Measure Score 0       Health Habits and Functional and Cognitive Screenin/31/2023    10:11 AM   Functional & Cognitive Status   Do you have difficulty preparing food and eating? No   Do you have difficulty bathing yourself, getting dressed or grooming yourself? No   Do you have difficulty using the toilet? No   Do you have difficulty moving around from place to place? No   Do you have trouble with steps or getting out of a bed or a chair? No   Do you need help using the phone?  No   Are you deaf or do you have serious difficulty hearing?  No   Do you need help with transportation? No   Do you need help shopping? No   Do you need help preparing meals?  No   Do you need help with housework?  No   Do you need help with laundry? No   Do you need help taking your medications? No   Do you need help managing money? No   Do you ever drive or ride in a car without wearing a seat belt? No   Have you felt unusual stress, anger or loneliness in the last month? No   Who do you live with? Spouse   If you need help, do you have trouble finding someone available to you? No   Have you been bothered in the last four weeks by sexual problems? No   Do you have difficulty concentrating, remembering or making decisions? No       Age-appropriate Screening Schedule:  Refer to the list below for future screening recommendations based on patient's age, sex and/or medical conditions. Orders for these recommended tests are listed in the plan section. The patient has been provided with a written plan.    Health Maintenance   Topic Date Due   • ZOSTER VACCINE (1 of 2) Never done   • TDAP/TD VACCINES (2 - Td or Tdap) 2021   • Pneumococcal Vaccine 65+ (2 - PCV) 2022   • INFLUENZA VACCINE  2023   • LIPID PANEL  2024   • ANNUAL WELLNESS VISIT  2024   • COLORECTAL CANCER SCREENING  12/10/2028   • HEPATITIS C SCREENING  Completed   • COVID-19 Vaccine  Completed             "      CMS Preventative Services Quick Reference  Risk Factors Identified During Encounter  None Identified  The above risks/problems have been discussed with the patient.  Pertinent information has been shared with the patient in the After Visit Summary.  An After Visit Summary and PPPS were made available to the patient.    Follow Up:   Next Medicare Wellness visit to be scheduled in 1 year.       Additional E&M Note during same encounter follows:  Patient has multiple medical problems which are significant and separately identifiable that require additional work above and beyond the Medicare Wellness Visit.      Chief Complaint  Medicare Wellness-subsequent and Annual Exam    Subjective        He is here today for a Medicare annual wellness visit but also for review of labs done last week and a prostate exam.    Terrance Gresham is also being seen today for lab review and prostate exam.    Review of Systems   Constitutional: Negative.    HENT: Negative.    Respiratory: Negative.    Cardiovascular: Negative.    Musculoskeletal: Negative.    Psychiatric/Behavioral: Negative.        Objective   Vital Signs:  /62 (BP Location: Right arm, Patient Position: Sitting)   Pulse 78   Temp 98.1 °F (36.7 °C)   Ht 180.3 cm (71\")   Wt 80.7 kg (178 lb)   SpO2 98%   BMI 24.83 kg/m²     Physical Exam  Vitals and nursing note reviewed.   Constitutional:       General: He is not in acute distress.     Appearance: Normal appearance. He is normal weight. He is not ill-appearing, toxic-appearing or diaphoretic.      Comments: Pleasant, neatly groomed, no distress.  BMI 24.8.   HENT:      Head: Normocephalic and atraumatic.   Eyes:      General: No scleral icterus.        Right eye: No discharge.         Left eye: No discharge.      Conjunctiva/sclera: Conjunctivae normal.   Neck:      Vascular: No carotid bruit.   Cardiovascular:      Rate and Rhythm: Normal rate and regular rhythm.      Heart sounds: Normal heart sounds. " No murmur heard.    No friction rub. No gallop.   Pulmonary:      Effort: Pulmonary effort is normal. No respiratory distress.      Breath sounds: Normal breath sounds. No wheezing, rhonchi or rales.   Chest:      Chest wall: No tenderness.   Abdominal:      General: Abdomen is flat. There is no distension.      Palpations: Abdomen is soft. There is no mass.      Tenderness: There is no abdominal tenderness. There is no right CVA tenderness, left CVA tenderness, guarding or rebound.      Hernia: No hernia is present.   Musculoskeletal:      Cervical back: No tenderness.   Lymphadenopathy:      Cervical: No cervical adenopathy.   Skin:     General: Skin is dry.   Neurological:      Mental Status: He is alert and oriented to person, place, and time.   Psychiatric:         Mood and Affect: Mood normal.         Behavior: Behavior normal.         Thought Content: Thought content normal.                         Assessment and Plan   Diagnoses and all orders for this visit:    1. Medicare annual wellness visit, subsequent (Primary)    2. Hypercholesterolemia        He and I reviewed his labs.  His LDL cholesterol is too high.  Review of vascular screening from few years ago shows no plaque in either carotid artery.    He feels that he can make significant changes in his diet.  He is to many fried foods.    I asked him to follow-up in about 6 months.  I asked him to get another vascular screening specifically carotid artery and aortic aneurysm screening.    Fasting labs prior to his appointment 6 months from now should include: Lipid profile, comprehensive metabolic panel, TSH with reflex free T4 (in light of elevated cholesterol).    I will add a PSA on the labs done last week.           Follow Up   No follow-ups on file.  Patient was given instructions and counseling regarding his condition or for health maintenance advice. Please see specific information pulled into the AVS if appropriate.

## 2023-06-16 ENCOUNTER — HOSPITAL ENCOUNTER (EMERGENCY)
Facility: HOSPITAL | Age: 68
Discharge: HOME | End: 2023-06-16
Attending: EMERGENCY MEDICINE
Payer: MEDICARE

## 2023-06-16 ENCOUNTER — APPOINTMENT (OUTPATIENT)
Dept: CT IMAGING | Facility: HOSPITAL | Age: 68
End: 2023-06-16
Payer: MEDICARE

## 2023-06-16 VITALS
HEART RATE: 68 BPM | DIASTOLIC BLOOD PRESSURE: 73 MMHG | HEIGHT: 71 IN | SYSTOLIC BLOOD PRESSURE: 123 MMHG | RESPIRATION RATE: 17 BRPM | OXYGEN SATURATION: 97 % | BODY MASS INDEX: 25.2 KG/M2 | TEMPERATURE: 97.9 F | WEIGHT: 180 LBS

## 2023-06-16 DIAGNOSIS — N20.0 BILATERAL NEPHROLITHIASIS: ICD-10-CM

## 2023-06-16 DIAGNOSIS — N20.1 CALCULUS OF URETER: Primary | ICD-10-CM

## 2023-06-16 LAB
ALBUMIN SERPL-MCNC: 4.1 G/DL (ref 3.5–5.2)
ALBUMIN/GLOB SERPL: 2 G/DL
ALP SERPL-CCNC: 67 U/L (ref 39–117)
ALT SERPL W P-5'-P-CCNC: 17 U/L (ref 1–41)
ANION GAP SERPL CALCULATED.3IONS-SCNC: 8.6 MMOL/L (ref 5–15)
AST SERPL-CCNC: 19 U/L (ref 1–40)
BACTERIA UR QL AUTO: ABNORMAL /HPF
BASOPHILS # BLD AUTO: 0.03 10*3/MM3 (ref 0–0.2)
BASOPHILS NFR BLD AUTO: 0.8 % (ref 0–1.5)
BILIRUB SERPL-MCNC: 0.5 MG/DL (ref 0–1.2)
BILIRUB UR QL STRIP: NEGATIVE
BUN SERPL-MCNC: 22 MG/DL (ref 8–23)
BUN/CREAT SERPL: 20.4 (ref 7–25)
CALCIUM SPEC-SCNC: 9.5 MG/DL (ref 8.6–10.5)
CHLORIDE SERPL-SCNC: 103 MMOL/L (ref 98–107)
CLARITY UR: CLEAR
CO2 SERPL-SCNC: 28.4 MMOL/L (ref 22–29)
COLOR UR: YELLOW
CREAT SERPL-MCNC: 1.08 MG/DL (ref 0.76–1.27)
DEPRECATED RDW RBC AUTO: 41 FL (ref 37–54)
EGFRCR SERPLBLD CKD-EPI 2021: 74.3 ML/MIN/1.73
EOSINOPHIL # BLD AUTO: 0.05 10*3/MM3 (ref 0–0.4)
EOSINOPHIL NFR BLD AUTO: 1.3 % (ref 0.3–6.2)
ERYTHROCYTE [DISTWIDTH] IN BLOOD BY AUTOMATED COUNT: 11.9 % (ref 12.3–15.4)
GLOBULIN UR ELPH-MCNC: 2.1 GM/DL
GLUCOSE SERPL-MCNC: 108 MG/DL (ref 65–99)
GLUCOSE UR STRIP-MCNC: NEGATIVE MG/DL
HCT VFR BLD AUTO: 43.4 % (ref 37.5–51)
HGB BLD-MCNC: 14.8 G/DL (ref 13–17.7)
HGB UR QL STRIP.AUTO: ABNORMAL
HYALINE CASTS UR QL AUTO: ABNORMAL /LPF
IMM GRANULOCYTES # BLD AUTO: 0.01 10*3/MM3 (ref 0–0.05)
IMM GRANULOCYTES NFR BLD AUTO: 0.3 % (ref 0–0.5)
KETONES UR QL STRIP: ABNORMAL
LEUKOCYTE ESTERASE UR QL STRIP.AUTO: NEGATIVE
LIPASE SERPL-CCNC: 16 U/L (ref 13–60)
LYMPHOCYTES # BLD AUTO: 1.3 10*3/MM3 (ref 0.7–3.1)
LYMPHOCYTES NFR BLD AUTO: 33.3 % (ref 19.6–45.3)
MCH RBC QN AUTO: 32.3 PG (ref 26.6–33)
MCHC RBC AUTO-ENTMCNC: 34.1 G/DL (ref 31.5–35.7)
MCV RBC AUTO: 94.8 FL (ref 79–97)
MONOCYTES # BLD AUTO: 0.47 10*3/MM3 (ref 0.1–0.9)
MONOCYTES NFR BLD AUTO: 12.1 % (ref 5–12)
NEUTROPHILS NFR BLD AUTO: 2.04 10*3/MM3 (ref 1.7–7)
NEUTROPHILS NFR BLD AUTO: 52.2 % (ref 42.7–76)
NITRITE UR QL STRIP: NEGATIVE
NRBC BLD AUTO-RTO: 0 /100 WBC (ref 0–0.2)
PH UR STRIP.AUTO: 5.5 [PH] (ref 5–8)
PLATELET # BLD AUTO: 118 10*3/MM3 (ref 140–450)
PMV BLD AUTO: 9.2 FL (ref 6–12)
POTASSIUM SERPL-SCNC: 3.8 MMOL/L (ref 3.5–5.2)
PROT SERPL-MCNC: 6.2 G/DL (ref 6–8.5)
PROT UR QL STRIP: NEGATIVE
RBC # BLD AUTO: 4.58 10*6/MM3 (ref 4.14–5.8)
RBC # UR STRIP: ABNORMAL /HPF
REF LAB TEST METHOD: ABNORMAL
SODIUM SERPL-SCNC: 140 MMOL/L (ref 136–145)
SP GR UR STRIP: 1.02 (ref 1–1.03)
SQUAMOUS #/AREA URNS HPF: ABNORMAL /HPF
UROBILINOGEN UR QL STRIP: ABNORMAL
WBC # UR STRIP: ABNORMAL /HPF
WBC NRBC COR # BLD: 3.9 10*3/MM3 (ref 3.4–10.8)

## 2023-06-16 PROCEDURE — 83690 ASSAY OF LIPASE: CPT | Performed by: EMERGENCY MEDICINE

## 2023-06-16 PROCEDURE — 81001 URINALYSIS AUTO W/SCOPE: CPT | Performed by: EMERGENCY MEDICINE

## 2023-06-16 PROCEDURE — 25010000002 KETOROLAC TROMETHAMINE PER 15 MG: Performed by: EMERGENCY MEDICINE

## 2023-06-16 PROCEDURE — 85025 COMPLETE CBC W/AUTO DIFF WBC: CPT | Performed by: EMERGENCY MEDICINE

## 2023-06-16 PROCEDURE — 74176 CT ABD & PELVIS W/O CONTRAST: CPT

## 2023-06-16 PROCEDURE — 80053 COMPREHEN METABOLIC PANEL: CPT | Performed by: EMERGENCY MEDICINE

## 2023-06-16 RX ORDER — TAMSULOSIN HYDROCHLORIDE 0.4 MG/1
0.4 CAPSULE ORAL ONCE
Status: COMPLETED | OUTPATIENT
Start: 2023-06-16 | End: 2023-06-16

## 2023-06-16 RX ORDER — KETOROLAC TROMETHAMINE 15 MG/ML
15 INJECTION, SOLUTION INTRAMUSCULAR; INTRAVENOUS ONCE
Status: COMPLETED | OUTPATIENT
Start: 2023-06-16 | End: 2023-06-16

## 2023-06-16 RX ORDER — HYDROCODONE BITARTRATE AND ACETAMINOPHEN 5; 325 MG/1; MG/1
1 TABLET ORAL ONCE
Status: COMPLETED | OUTPATIENT
Start: 2023-06-16 | End: 2023-06-16

## 2023-06-16 RX ORDER — TAMSULOSIN HYDROCHLORIDE 0.4 MG/1
1 CAPSULE ORAL DAILY
Qty: 30 CAPSULE | Refills: 0 | Status: SHIPPED | OUTPATIENT
Start: 2023-06-16

## 2023-06-16 RX ORDER — HYDROCODONE BITARTRATE AND ACETAMINOPHEN 7.5; 325 MG/1; MG/1
1 TABLET ORAL EVERY 6 HOURS PRN
Qty: 12 TABLET | Refills: 0 | Status: SHIPPED | OUTPATIENT
Start: 2023-06-16

## 2023-06-16 RX ADMIN — SODIUM CHLORIDE 1000 ML: 9 INJECTION, SOLUTION INTRAVENOUS at 20:20

## 2023-06-16 RX ADMIN — KETOROLAC TROMETHAMINE 15 MG: 15 INJECTION, SOLUTION INTRAMUSCULAR; INTRAVENOUS at 22:24

## 2023-06-16 RX ADMIN — HYDROCODONE BITARTRATE AND ACETAMINOPHEN 1 TABLET: 5; 325 TABLET ORAL at 22:24

## 2023-06-16 RX ADMIN — TAMSULOSIN HYDROCHLORIDE 0.4 MG: 0.4 CAPSULE ORAL at 22:24

## 2023-06-16 NOTE — ED PROVIDER NOTES
EMERGENCY DEPARTMENT ENCOUNTER    Room Number:  09/09  Date of encounter:  6/16/2023  PCP: Moose Chase MD  Historian: Patient      HPI:  Chief Complaint: Right flank pain  A complete HPI/ROS/PMH/PSH/SH/FH are unobtainable due to: None    Context: Terrance Gresham is a 67 y.o. male who presents to the ED via private vehicle for evaluation of right flank pain that started Wednesday after working in the yard and had some beers.  Resolved and was fine and then did some work again today and had a beer and pain returned.  Has not radiated, is in the same spot is Wednesday.  Does have a history of prior kidney stone on the opposite side.  No nausea or vomiting, no dysuria, no hematuria.  Bowel movements been regular and soft.  Did take some ibuprofen for the pain.      MEDICAL RECORD REVIEW    External (non-ED) record review: No anticoagulation noted on chart review in epic    PAST MEDICAL HISTORY  Active Ambulatory Problems     Diagnosis Date Noted    Hypogonadism in male 05/09/2016    Hypercholesterolemia 05/09/2016    Gastroesophageal reflux disease 05/09/2016    Paresthesia of lower extremity 05/09/2016    Thoracic back pain 05/09/2016    Erectile dysfunction of nonorganic origin 05/09/2016    Well adult exam 11/17/2016    Leukopenia 08/29/2018    History of colon polyps 09/07/2018    Medicare annual wellness visit, subsequent 01/19/2022    Community acquired pneumonia 04/29/2022    Pneumonia due to Streptococcus pneumoniae 04/30/2022    Influenza A 04/30/2022    Elevated d-dimer 05/02/2022    Bacteremia due to Streptococcus pneumoniae 05/02/2022    Annual physical exam 06/10/2022     Resolved Ambulatory Problems     Diagnosis Date Noted    Adiposity 05/09/2016    Essential hypertension 05/09/2016    Chest pain, atypical 02/24/2017    Depression with anxiety 03/24/2020    Dyspnea 04/30/2022     Past Medical History:   Diagnosis Date    Disc disorder     Enlarged prostate     Hx of colonic polyp      "Hyperlipidemia     Hypertension     Kidney stone 2007    Pneumonia 2022         PAST SURGICAL HISTORY  Past Surgical History:   Procedure Laterality Date    COLONOSCOPY N/A 12/10/2018    Procedure: COLONOSCOPY to cecuman ti;  Surgeon: Jose Montgomery MD;  Location: [facility] ENDOSCOPY;  Service: Gastroenterology    COLONOSCOPY W/ POLYPECTOMY      CYST REMOVAL Right 2012    wrist     KNEE ARTHROSCOPY Right 2015    KNEE SURGERY Right 01/13/2016         FAMILY HISTORY  Family History   Problem Relation Age of Onset    Stomach cancer Mother     Stroke Mother     Breast cancer Mother     Lung cancer Mother     Prostate cancer Father     Alzheimer's disease Father     Kidney disease Father     Heart disease Father         Triple bypass    Hypertension Father     Cancer Paternal Grandfather     No Known Problems Sister     Prostate cancer Brother 60    Other Maternal Grandmother         Circulatory /heart problems    Heart attack Maternal Grandfather     Obesity Brother     Sleep apnea Brother          SOCIAL HISTORY  Social History     Socioeconomic History    Marital status:      Spouse name: Kimberly    Years of education: College   Tobacco Use    Smoking status: Never    Smokeless tobacco: Never   Vaping Use    Vaping Use: Never used   Substance and Sexual Activity    Alcohol use: Yes     Alcohol/week: 4.0 standard drinks     Types: 4 Cans of beer per week     Comment: \"4 beers on weekends\"     Drug use: No    Sexual activity: Yes     Partners: Female     Birth control/protection: Hysterectomy         ALLERGIES  Patient has no known allergies.        REVIEW OF SYSTEMS  Review of Systems     All systems reviewed and negative except for those discussed in HPI.       PHYSICAL EXAM    I have reviewed the triage vital signs and nursing notes.    ED Triage Vitals [06/16/23 1913]   Temp Heart Rate Resp BP SpO2   98 °F (36.7 °C) 88 16 -- 97 %      Temp src Heart Rate Source Patient Position BP Location FiO2 (%)   Tympanic " Monitor -- -- --       Physical Exam  General: No acute distress, nontoxic, nondiaphoretic  HEENT: Mucous membranes moist, atraumatic, EOMI  Neck: Full ROM  Pulm: Symmetric chest rise, nonlabored, lungs CTAB  Cardiovascular: Regular rate and rhythm, intact distal pulses  GI: Soft, nontender, nondistended, no rebound, no guarding, bowel sounds present  MSK: Full ROM, no deformity, minimally reproducible right CVA tenderness to palpation  Skin: Warm, dry  Neuro: Awake, alert, oriented x 4, GCS 15, moving all extremities, no focal deficits  Psych: Calm, cooperative        LAB RESULTS  Recent Results (from the past 24 hour(s))   Comprehensive Metabolic Panel    Collection Time: 06/16/23  7:40 PM    Specimen: Blood   Result Value Ref Range    Glucose 108 (H) 65 - 99 mg/dL    BUN 22 8 - 23 mg/dL    Creatinine 1.08 0.76 - 1.27 mg/dL    Sodium 140 136 - 145 mmol/L    Potassium 3.8 3.5 - 5.2 mmol/L    Chloride 103 98 - 107 mmol/L    CO2 28.4 22.0 - 29.0 mmol/L    Calcium 9.5 8.6 - 10.5 mg/dL    Total Protein 6.2 6.0 - 8.5 g/dL    Albumin 4.1 3.5 - 5.2 g/dL    ALT (SGPT) 17 1 - 41 U/L    AST (SGOT) 19 1 - 40 U/L    Alkaline Phosphatase 67 39 - 117 U/L    Total Bilirubin 0.5 0.0 - 1.2 mg/dL    Globulin 2.1 gm/dL    A/G Ratio 2.0 g/dL    BUN/Creatinine Ratio 20.4 7.0 - 25.0    Anion Gap 8.6 5.0 - 15.0 mmol/L    eGFR 74.3 >60.0 mL/min/1.73   Lipase    Collection Time: 06/16/23  7:40 PM    Specimen: Blood   Result Value Ref Range    Lipase 16 13 - 60 U/L   CBC Auto Differential    Collection Time: 06/16/23  7:40 PM    Specimen: Blood   Result Value Ref Range    WBC 3.90 3.40 - 10.80 10*3/mm3    RBC 4.58 4.14 - 5.80 10*6/mm3    Hemoglobin 14.8 13.0 - 17.7 g/dL    Hematocrit 43.4 37.5 - 51.0 %    MCV 94.8 79.0 - 97.0 fL    MCH 32.3 26.6 - 33.0 pg    MCHC 34.1 31.5 - 35.7 g/dL    RDW 11.9 (L) 12.3 - 15.4 %    RDW-SD 41.0 37.0 - 54.0 fl    MPV 9.2 6.0 - 12.0 fL    Platelets 118 (L) 140 - 450 10*3/mm3    Neutrophil % 52.2 42.7 - 76.0  %    Lymphocyte % 33.3 19.6 - 45.3 %    Monocyte % 12.1 (H) 5.0 - 12.0 %    Eosinophil % 1.3 0.3 - 6.2 %    Basophil % 0.8 0.0 - 1.5 %    Immature Grans % 0.3 0.0 - 0.5 %    Neutrophils, Absolute 2.04 1.70 - 7.00 10*3/mm3    Lymphocytes, Absolute 1.30 0.70 - 3.10 10*3/mm3    Monocytes, Absolute 0.47 0.10 - 0.90 10*3/mm3    Eosinophils, Absolute 0.05 0.00 - 0.40 10*3/mm3    Basophils, Absolute 0.03 0.00 - 0.20 10*3/mm3    Immature Grans, Absolute 0.01 0.00 - 0.05 10*3/mm3    nRBC 0.0 0.0 - 0.2 /100 WBC   Urinalysis With Microscopic If Indicated (No Culture) - Urine, Clean Catch    Collection Time: 06/16/23  9:24 PM    Specimen: Urine, Clean Catch   Result Value Ref Range    Color, UA Yellow Yellow, Straw    Appearance, UA Clear Clear    pH, UA 5.5 5.0 - 8.0    Specific Gravity, UA 1.023 1.005 - 1.030    Glucose, UA Negative Negative    Ketones, UA Trace (A) Negative    Bilirubin, UA Negative Negative    Blood, UA Large (3+) (A) Negative    Protein, UA Negative Negative    Leuk Esterase, UA Negative Negative    Nitrite, UA Negative Negative    Urobilinogen, UA 1.0 E.U./dL 0.2 - 1.0 E.U./dL   Urinalysis, Microscopic Only - Urine, Clean Catch    Collection Time: 06/16/23  9:24 PM    Specimen: Urine, Clean Catch   Result Value Ref Range    RBC, UA Too Numerous to Count (A) None Seen, 0-2 /HPF    WBC, UA 0-2 None Seen, 0-2 /HPF    Bacteria, UA None Seen None Seen /HPF    Squamous Epithelial Cells, UA 0-2 None Seen, 0-2 /HPF    Hyaline Casts, UA 0-2 None Seen /LPF    Methodology Automated Microscopy        Ordered the above labs and independently interpreted results. My findings will be discussed in the medical decision making section below        RADIOLOGY  CT Abdomen Pelvis Stone Protocol    Result Date: 6/16/2023  CT ABDOMEN AND PELVIS WITHOUT IV CONTRAST  HISTORY: Right flank pain, kidney stone suspected  TECHNIQUE: Radiation dose reduction techniques were utilized, including automated exposure control and exposure  modulation based on body size. 3 mm images were obtained through the abdomen and pelvis without IV contrast.  COMPARISON: CTA chest 05/13/2022 and CT abdomen pelvis 10/15/2018  FINDINGS: Sub-6 mm nodule right middle lobe seen on 05/13/2022 is incompletely visualized.  No findings of small bowel obstruction are present. The appendix is not seen. The gallbladder is decompressed and cannot be evaluated.  Subcentimeter hepatic lesions are too small to characterize. The pancreas, spleen and adrenal glands have an unremarkable postcontrast CT appearance. Bilateral hypodense renal lesions demonstrating density less than 15 Hounsfield units are likely benign per Bosniak 2019 criteria. There is bilateral nephrolithiasis measuring up to approximately 9 to 10 mm on the left and 4 to 5 mm on the right. There is a 5 x 3 mm calculus within the right distal ureter just proximal to the right ureterovesical junction. There is mild to moderate right hydroureteronephrosis.  No abdominopelvic adenopathy by size the bladder is decompressed and cannot be evaluated. No free intraperitoneal air is seen. For the purposes of this dictation, last well-formed disc space be referred to as L5-S1. There is moderate anterolisthesis of L5 on S1 secondary to bilateral pars defects. No suspicious lytic blastic osseous lesions are present.      1.  5 x 3 mm calculus within the right distal ureter just proximal to the right ureterovesical junction with mild to moderate upstream hydroureteronephrosis. 2.  Bilateral nephrolithiasis. 3.  Other findings as above.  This report was finalized on 6/16/2023 9:53 PM by Dr. Ady Ramirez M.D.       Ordered the above noted radiological studies.  Independently interpreted by me and my independent review of findings can be found in the ED Course.  See dictation for official radiology interpretation.      PROCEDURES    Procedures      MEDICATIONS GIVEN IN ER    Medications   ketorolac (TORADOL) injection 15 mg (has  no administration in time range)   tamsulosin (FLOMAX) 24 hr capsule 0.4 mg (has no administration in time range)   HYDROcodone-acetaminophen (NORCO) 5-325 MG per tablet 1 tablet (has no administration in time range)   sodium chloride 0.9 % bolus 1,000 mL (1,000 mL Intravenous New Bag 6/16/23 2020)         PROGRESS, DATA ANALYSIS, CONSULTS, AND MEDICAL DECISION MAKING    Please note that this section constitutes my independent interpretation of clinical data including lab results, radiology, EKG's.  This constitutes my independent professional opinion regarding differential diagnosis and management of this patient.  It may include any factors such as history from outside sources, review of external records, social determinants of health, management of medications, response to those treatments, and discussions with other providers.    Differential Diagnosis and Plan: Initial concern for urolithiasis, muscle strain or spasm, cholelithiasis, cholecystitis, dehydration, renal failure, lecture abnormalities, among others.  Plan for labs, CT scan, IV fluids, and symptomatic control as needed, reevaluation with results.  Additional sources:  - Discussed/ obtained information from independent historians: Wife at bedside states that patient did take ibuprofen for pain which is unusual because he does not take pain medicine for anything typically     - Chronic or social conditions impacting care:      - Shared decision making:  Patient and wife at bedside fully updated on and in agreement with the course and plan moving forward    ED Course as of 06/16/23 2214 Fri Jun 16, 2023 2010 CT Abdomen Pelvis Stone Protocol [DC]   2012 Glucose(!): 108 [DC]   2012 BUN: 22 [DC]   2012 Creatinine: 1.08 [DC]   2012 Sodium: 140 [DC]   2012 Potassium: 3.8 [DC]   2012 ALT (SGPT): 17 [DC]   2012 AST (SGOT): 19 [DC]   2012 Alkaline Phosphatase: 67 [DC]   2012 Total Bilirubin: 0.5 [DC]   2012 Lipase: 16 [DC]   2012 WBC: 3.90 [DC]   2012  Hemoglobin: 14.8 [DC]   2012 Platelets(!): 118 [DC]   2151 Nitrite, UA: Negative [DC]   2151 Leukocytes, UA: Negative [DC]   2151 Blood, UA(!): Large (3+) [DC]   2151 RBC, UA(!): Too Numerous to Count [DC]   2210 CT Abdomen Pelvis Stone Protocol  Radiology report reviewed, 5 x 3 mm distal right ureteral stone with mild upstream hydronephrosis [DC]   2210 Patient and wife updated on the findings today and plan for discharge.  We will give a dose of Toradol, Flomax, and hydrocodone prior to discharge, has a urology follow-up already scheduled for this coming Tuesday.  ED return for worsening symptoms as needed. [DC]      ED Course User Index  [DC] Caesar Lee MD     Patient is well-appearing and in no acute distress.    Hospitalization Considered?:     Orders Placed During This Visit:  Orders Placed This Encounter   Procedures    CT Abdomen Pelvis Stone Protocol    Comprehensive Metabolic Panel    Lipase    Urinalysis With Microscopic If Indicated (No Culture) - Urine, Clean Catch    CBC Auto Differential    Urinalysis, Microscopic Only - Urine, Clean Catch    CBC & Differential       Additional orders considered but not placed:      Independent interpretation of labs, radiology studies, and discussions with consultants: See ED Course        AS OF 22:14 EDT VITALS:    BP - 122/79  HR - 74  TEMP - 97.9 °F (36.6 °C) (Oral)  02 SATS - 97%        DIAGNOSIS  Final diagnoses:   Calculus of ureter   Bilateral nephrolithiasis         DISPOSITION  DISCHARGE    Patient discharged in stable condition.    Reviewed implications of results, diagnosis, meds, responsibility to follow up, warning signs and symptoms of possible worsening, potential complications and reasons to return to ER. If your blood pressure > 120/80 please follow up with your primary care provider for further management.    Patient/Family voiced understanding of above instructions.    Discussed plan for discharge, as there is no emergent indication for  admission. Pt/family is agreeable and understands need for follow up and repeat testing.  Pt is aware that discharge does not mean that nothing is wrong but it indicates no emergency is present that requires admission and they must continue care with follow-up as given below or physician of their choice.     FOLLOW-UP  [facility]  [address]  [facility] [place] [facility] [id number] [phone]    As needed, If symptoms worsen    [facility]  [address] [facility] [address]  [phone]  Go to   As scheduled         Medication List        New Prescriptions      HYDROcodone-acetaminophen 7.5-325 MG per tablet  Commonly known as: NORCO  Take 1 tablet by mouth Every 6 (Six) Hours As Needed for Moderate Pain.     tamsulosin 0.4 MG capsule 24 hr capsule  Commonly known as: FLOMAX  Take 1 capsule by mouth Daily.               Where to Get Your Medications        These medications were sent to [facility] #[id number] - [place], [place] - [address] [facility] AT [facility] [phone] [facility] [phone] [address] [address]      Phone: [phone]   HYDROcodone-acetaminophen 7.5-325 MG per tablet  tamsulosin 0.4 MG capsule 24 hr capsule               [facility] reviewed by Caesar Lee MD       --    Please note that portions of this were completed with a voice recognition program.       Note Disclaimer: At [facility], we believe that sharing information builds trust and better relationships. You are receiving this note because you are receiving care at [facility] or recently visited. It is possible you will see health information before a provider has talked with you about it. This kind of information can be easy to misunderstand. To help you fully understand what it means for your health, we urge you to discuss this note with your provider.           Caesar Lee MD  06/16/23 4705

## 2023-06-17 NOTE — DISCHARGE INSTRUCTIONS
Take medications as prescribed, stay well-hydrated, outpatient urology follow-up next week as scheduled, ED return for worsening symptoms as needed.   No complaints

## 2023-07-05 PROBLEM — N20.0 RENAL CALCULI: Status: ACTIVE | Noted: 2023-07-05

## 2023-07-20 ENCOUNTER — HOSPITAL ENCOUNTER (OUTPATIENT)
Dept: CT IMAGING | Facility: HOSPITAL | Age: 68
Discharge: HOME | End: 2023-07-20
Admitting: INTERNAL MEDICINE
Payer: MEDICARE

## 2023-07-20 VITALS
OXYGEN SATURATION: 96 % | WEIGHT: 175 LBS | SYSTOLIC BLOOD PRESSURE: 99 MMHG | BODY MASS INDEX: 24.5 KG/M2 | DIASTOLIC BLOOD PRESSURE: 59 MMHG | TEMPERATURE: 98.1 F | HEIGHT: 71 IN | RESPIRATION RATE: 16 BRPM | HEART RATE: 64 BPM

## 2023-07-20 DIAGNOSIS — R07.2 PRECORDIAL PAIN: ICD-10-CM

## 2023-07-20 LAB — CREAT BLDA-MCNC: 0.9 MG/DL (ref 0.6–1.3)

## 2023-07-20 PROCEDURE — 82565 ASSAY OF CREATININE: CPT

## 2023-07-20 PROCEDURE — 75574 CT ANGIO HRT W/3D IMAGE: CPT

## 2023-07-20 PROCEDURE — 25510000001 IOPAMIDOL PER 1 ML: Performed by: INTERNAL MEDICINE

## 2023-07-20 PROCEDURE — 93005 ELECTROCARDIOGRAM TRACING: CPT | Performed by: INTERNAL MEDICINE

## 2023-07-20 RX ORDER — NITROGLYCERIN 0.4 MG/1
0.4 TABLET SUBLINGUAL ONCE
Status: COMPLETED | OUTPATIENT
Start: 2023-07-20 | End: 2023-07-20

## 2023-07-20 RX ORDER — METOPROLOL TARTRATE 5 MG/5ML
5 INJECTION INTRAVENOUS
Status: DISCONTINUED | OUTPATIENT
Start: 2023-07-20 | End: 2023-07-21 | Stop reason: HOSPADM

## 2023-07-20 RX ADMIN — NITROGLYCERIN 0.4 MG: 0.4 TABLET SUBLINGUAL at 14:23

## 2023-07-20 RX ADMIN — IOPAMIDOL 95 ML: 755 INJECTION, SOLUTION INTRAVENOUS at 14:28

## 2023-07-20 RX ADMIN — METOROPROLOL TARTRATE 5 MG: 5 INJECTION, SOLUTION INTRAVENOUS at 13:37

## 2023-07-21 LAB — QT INTERVAL: 384 MS

## 2023-07-25 ENCOUNTER — TELEPHONE (OUTPATIENT)
Dept: CARDIOLOGY | Facility: CLINIC | Age: 68
End: 2023-07-25
Payer: MEDICARE

## 2023-07-25 ENCOUNTER — DOCUMENTATION (OUTPATIENT)
Dept: CARDIOLOGY | Facility: CLINIC | Age: 68
End: 2023-07-25
Payer: MEDICARE

## 2023-07-25 RX ORDER — ATORVASTATIN CALCIUM 20 MG/1
20 TABLET, FILM COATED ORAL DAILY
Qty: 90 TABLET | Refills: 3 | Status: SHIPPED | OUTPATIENT
Start: 2023-07-25

## 2023-07-25 RX ORDER — ASPIRIN 81 MG/1
81 TABLET ORAL DAILY
Qty: 90 TABLET | Refills: 3 | Status: SHIPPED | OUTPATIENT
Start: 2023-07-25

## 2023-07-25 NOTE — PROGRESS NOTES
Cardiac CTA with morphology  Imaging done 7/20/23  Reason for the exam: chest pain    Calcium score is 912 Agatston units.  This is between the  percentile for men between the ages of 65-69 years old.    Heart rate 58 bpm.  Left ventricular end-diastolic volume 146 mL.  Left ventricular end-systolic volume 41 mL.  Ejection fraction 72%.  Stroke volume 105 mL.  Cardiac output 6.06 L/m    The right atrium is normal in size.  The right ventricle is mildly enlarged.  There is grossly normal right ventricular systolic function.  The pulmonary artery is normal in size.  There are 4 pulmonary veins which enter the left atrium in their expected location.  The left atrial appendage was visualized and is without thrombus.  The intra-atrial septum appeared to be intact.  The left ventricle is normal in size with normal systolic function with very mild anteroseptal hypokinesis.  There was no evidence of a left ventricular thrombus.  The intraventricular septum appeared to be intact.  The mitral valve appeared structurally normal.  The aortic valve was trileaflet and appears structurally and functionally normal.  The pulmonic valve appeared structurally normal.  The tricuspid valve appeared structurally normal.  There was no pericardial effusion.  The pericardium appeared normal.    The left main coronary artery came off the left coronary cusp in its anticipated location.  It trifurcated into the left anterior descending artery, ramus intermedius and the circumflex coronary artery.  There was evidence of atherosclerotic disease of the left main coronary artery, <10% calcified proximal left main stenosis.  Left anterior descending artery(LAD) is a large-caliber vessel and wraps around the apex of the heart.  There is evidence of atherosclerotic disease, 25% calcified noncalcified ostial stenosis, 50-75% calcified proximal LAD stenosis, 50% calcified noncalcified mid LAD stenosis.  The ramus intermedius is a small caliber  vessel with >50% calcified and noncalcified stenosis in the proximal vessel.  The circumflex artery is the nondominant vessel with evidence of atherosclerotic disease, <25% calcified scattered stenosis.  The right coronary artery is the dominant vessel with evidence of atherosclerotic disease, <25% scattered calcified stenosis,>50% calcified noncalcified mid RCA stenosis, distal vessel is small caliber with small caliber PDA-likely scattered noncalcified atherosclerosis throughout the vessel.    Conclusions:  1.  Normal coronary artery anatomy with evidence of atherosclerotic disease, moderate multivessel coronary artery disease-possibly severe distal RCA disease and a small caliber vessel, see above report.  Calcium score is 912 Agatston units.  2.  Mildly enlarged right ventricle, slight wall motion abnormalities of the left ventricle-see above report      Miri Skinner MD  07/25/23

## 2023-07-28 ENCOUNTER — TELEPHONE (OUTPATIENT)
Dept: CARDIOLOGY | Facility: CLINIC | Age: 68
End: 2023-07-28
Payer: MEDICARE

## 2023-07-28 NOTE — TELEPHONE ENCOUNTER
Spoke to patient about moderate thickening of hiatal hernia.  I have a message out to his primary care physician for consideration of GI referral.  I am seeing the patient in clinic on Wednesday.

## 2023-08-01 ENCOUNTER — HOSPITAL ENCOUNTER (EMERGENCY)
Facility: HOSPITAL | Age: 68
Discharge: HOME | End: 2023-08-01
Attending: EMERGENCY MEDICINE | Admitting: INTERNAL MEDICINE
Payer: MEDICARE

## 2023-08-01 ENCOUNTER — APPOINTMENT (OUTPATIENT)
Dept: GENERAL RADIOLOGY | Facility: HOSPITAL | Age: 68
End: 2023-08-01
Payer: MEDICARE

## 2023-08-01 VITALS
SYSTOLIC BLOOD PRESSURE: 110 MMHG | BODY MASS INDEX: 24.5 KG/M2 | TEMPERATURE: 97 F | HEIGHT: 71 IN | OXYGEN SATURATION: 96 % | DIASTOLIC BLOOD PRESSURE: 75 MMHG | HEART RATE: 77 BPM | WEIGHT: 175 LBS | RESPIRATION RATE: 18 BRPM

## 2023-08-01 DIAGNOSIS — R07.9 CHEST PAIN, UNSPECIFIED TYPE: Primary | ICD-10-CM

## 2023-08-01 LAB
ALBUMIN SERPL-MCNC: 4.7 G/DL (ref 3.5–5.2)
ALBUMIN/GLOB SERPL: 2.5 G/DL
ALP SERPL-CCNC: 58 U/L (ref 39–117)
ALT SERPL W P-5'-P-CCNC: 10 U/L (ref 1–41)
ANION GAP SERPL CALCULATED.3IONS-SCNC: 10 MMOL/L (ref 5–15)
AST SERPL-CCNC: 17 U/L (ref 1–40)
BASOPHILS # BLD AUTO: 0.04 10*3/MM3 (ref 0–0.2)
BASOPHILS NFR BLD AUTO: 1 % (ref 0–1.5)
BILIRUB SERPL-MCNC: 1.5 MG/DL (ref 0–1.2)
BUN SERPL-MCNC: 14 MG/DL (ref 8–23)
BUN/CREAT SERPL: 15.6 (ref 7–25)
CALCIUM SPEC-SCNC: 9.2 MG/DL (ref 8.6–10.5)
CHLORIDE SERPL-SCNC: 103 MMOL/L (ref 98–107)
CO2 SERPL-SCNC: 26 MMOL/L (ref 22–29)
CREAT SERPL-MCNC: 0.9 MG/DL (ref 0.76–1.27)
DEPRECATED RDW RBC AUTO: 40 FL (ref 37–54)
EGFRCR SERPLBLD CKD-EPI 2021: 92.5 ML/MIN/1.73
EOSINOPHIL # BLD AUTO: 0.01 10*3/MM3 (ref 0–0.4)
EOSINOPHIL NFR BLD AUTO: 0.2 % (ref 0.3–6.2)
ERYTHROCYTE [DISTWIDTH] IN BLOOD BY AUTOMATED COUNT: 11.7 % (ref 12.3–15.4)
GEN 5 2HR TROPONIN T REFLEX: 10 NG/L
GLOBULIN UR ELPH-MCNC: 1.9 GM/DL
GLUCOSE SERPL-MCNC: 102 MG/DL (ref 65–99)
HCT VFR BLD AUTO: 47.3 % (ref 37.5–51)
HGB BLD-MCNC: 16.5 G/DL (ref 13–17.7)
HOLD SPECIMEN: NORMAL
IMM GRANULOCYTES # BLD AUTO: 0.02 10*3/MM3 (ref 0–0.05)
IMM GRANULOCYTES NFR BLD AUTO: 0.5 % (ref 0–0.5)
LYMPHOCYTES # BLD AUTO: 0.63 10*3/MM3 (ref 0.7–3.1)
LYMPHOCYTES NFR BLD AUTO: 15.1 % (ref 19.6–45.3)
MCH RBC QN AUTO: 32.6 PG (ref 26.6–33)
MCHC RBC AUTO-ENTMCNC: 34.9 G/DL (ref 31.5–35.7)
MCV RBC AUTO: 93.5 FL (ref 79–97)
MONOCYTES # BLD AUTO: 0.33 10*3/MM3 (ref 0.1–0.9)
MONOCYTES NFR BLD AUTO: 7.9 % (ref 5–12)
NEUTROPHILS NFR BLD AUTO: 3.15 10*3/MM3 (ref 1.7–7)
NEUTROPHILS NFR BLD AUTO: 75.3 % (ref 42.7–76)
NRBC BLD AUTO-RTO: 0 /100 WBC (ref 0–0.2)
PLATELET # BLD AUTO: 150 10*3/MM3 (ref 140–450)
PMV BLD AUTO: 8.9 FL (ref 6–12)
POTASSIUM SERPL-SCNC: 4.2 MMOL/L (ref 3.5–5.2)
PROT SERPL-MCNC: 6.6 G/DL (ref 6–8.5)
QT INTERVAL: 341 MS
RBC # BLD AUTO: 5.06 10*6/MM3 (ref 4.14–5.8)
SODIUM SERPL-SCNC: 139 MMOL/L (ref 136–145)
TROPONIN T DELTA: 1 NG/L
TROPONIN T SERPL HS-MCNC: 9 NG/L
WBC NRBC COR # BLD: 4.18 10*3/MM3 (ref 3.4–10.8)
WHOLE BLOOD HOLD COAG: NORMAL
WHOLE BLOOD HOLD SPECIMEN: NORMAL

## 2023-08-01 PROCEDURE — 36415 COLL VENOUS BLD VENIPUNCTURE: CPT

## 2023-08-01 PROCEDURE — 80053 COMPREHEN METABOLIC PANEL: CPT

## 2023-08-01 PROCEDURE — 71045 X-RAY EXAM CHEST 1 VIEW: CPT

## 2023-08-01 PROCEDURE — 25510000001 IOPAMIDOL PER 1 ML: Performed by: INTERNAL MEDICINE

## 2023-08-01 PROCEDURE — 99284 EMERGENCY DEPT VISIT MOD MDM: CPT

## 2023-08-01 PROCEDURE — 93010 ELECTROCARDIOGRAM REPORT: CPT | Performed by: INTERNAL MEDICINE

## 2023-08-01 PROCEDURE — C1894 INTRO/SHEATH, NON-LASER: HCPCS | Performed by: INTERNAL MEDICINE

## 2023-08-01 PROCEDURE — C1769 GUIDE WIRE: HCPCS | Performed by: INTERNAL MEDICINE

## 2023-08-01 PROCEDURE — 25010000002 MIDAZOLAM PER 1 MG: Performed by: INTERNAL MEDICINE

## 2023-08-01 PROCEDURE — 25010000002 HEPARIN (PORCINE) PER 1000 UNITS: Performed by: INTERNAL MEDICINE

## 2023-08-01 PROCEDURE — 84484 ASSAY OF TROPONIN QUANT: CPT

## 2023-08-01 PROCEDURE — 99214 OFFICE O/P EST MOD 30 MIN: CPT | Performed by: INTERNAL MEDICINE

## 2023-08-01 PROCEDURE — 93005 ELECTROCARDIOGRAM TRACING: CPT

## 2023-08-01 PROCEDURE — 93458 L HRT ARTERY/VENTRICLE ANGIO: CPT | Performed by: INTERNAL MEDICINE

## 2023-08-01 PROCEDURE — 85025 COMPLETE CBC W/AUTO DIFF WBC: CPT

## 2023-08-01 PROCEDURE — 25010000002 FENTANYL CITRATE (PF) 50 MCG/ML SOLUTION: Performed by: INTERNAL MEDICINE

## 2023-08-01 RX ORDER — ASPIRIN 325 MG
325 TABLET ORAL ONCE
Status: COMPLETED | OUTPATIENT
Start: 2023-08-01 | End: 2023-08-01

## 2023-08-01 RX ORDER — VERAPAMIL HYDROCHLORIDE 2.5 MG/ML
INJECTION, SOLUTION INTRAVENOUS
Status: DISCONTINUED | OUTPATIENT
Start: 2023-08-01 | End: 2023-08-01 | Stop reason: HOSPADM

## 2023-08-01 RX ORDER — SODIUM CHLORIDE 9 MG/ML
INJECTION, SOLUTION INTRAVENOUS
Status: COMPLETED | OUTPATIENT
Start: 2023-08-01 | End: 2023-08-01

## 2023-08-01 RX ORDER — SODIUM CHLORIDE 0.9 % (FLUSH) 0.9 %
3 SYRINGE (ML) INJECTION EVERY 12 HOURS SCHEDULED
Status: DISCONTINUED | OUTPATIENT
Start: 2023-08-01 | End: 2023-08-01 | Stop reason: HOSPADM

## 2023-08-01 RX ORDER — LIDOCAINE HYDROCHLORIDE 20 MG/ML
INJECTION, SOLUTION INFILTRATION; PERINEURAL
Status: DISCONTINUED | OUTPATIENT
Start: 2023-08-01 | End: 2023-08-01 | Stop reason: HOSPADM

## 2023-08-01 RX ORDER — MIDAZOLAM HYDROCHLORIDE 1 MG/ML
INJECTION INTRAMUSCULAR; INTRAVENOUS
Status: DISCONTINUED | OUTPATIENT
Start: 2023-08-01 | End: 2023-08-01 | Stop reason: HOSPADM

## 2023-08-01 RX ORDER — FENTANYL CITRATE 50 UG/ML
INJECTION, SOLUTION INTRAMUSCULAR; INTRAVENOUS
Status: DISCONTINUED | OUTPATIENT
Start: 2023-08-01 | End: 2023-08-01 | Stop reason: HOSPADM

## 2023-08-01 RX ORDER — HEPARIN SODIUM 1000 [USP'U]/ML
INJECTION, SOLUTION INTRAVENOUS; SUBCUTANEOUS
Status: DISCONTINUED | OUTPATIENT
Start: 2023-08-01 | End: 2023-08-01 | Stop reason: HOSPADM

## 2023-08-01 RX ORDER — SODIUM CHLORIDE 0.9 % (FLUSH) 0.9 %
10 SYRINGE (ML) INJECTION AS NEEDED
Status: DISCONTINUED | OUTPATIENT
Start: 2023-08-01 | End: 2023-08-01 | Stop reason: HOSPADM

## 2023-08-01 RX ORDER — SODIUM CHLORIDE 9 MG/ML
100 INJECTION, SOLUTION INTRAVENOUS CONTINUOUS
Status: ACTIVE | OUTPATIENT
Start: 2023-08-01 | End: 2023-08-01

## 2023-08-01 RX ORDER — SODIUM CHLORIDE 9 MG/ML
40 INJECTION, SOLUTION INTRAVENOUS AS NEEDED
Status: DISCONTINUED | OUTPATIENT
Start: 2023-08-01 | End: 2023-08-01 | Stop reason: HOSPADM

## 2023-08-01 RX ADMIN — ASPIRIN 325 MG: 325 TABLET ORAL at 11:25

## 2023-08-01 NOTE — ED PROVIDER NOTES
" EMERGENCY DEPARTMENT ENCOUNTER    Room Number:  29/29  PCP: Moose Chase MD  Patient Care Team:  Moose Chase MD as PCP - General (Internal Medicine)  Yaakov Hernandez MD as Consulting Physician (Hematology and Oncology)  Joao Amato Jr., MD as Consulting Physician (Cardiology)   Independent Historians: Patient and his wife    HPI:  Chief Complaint: Chest pain        Context: Terrance Gresham is a 67 y.o. male who presents to the ED c/o acute chest discomfort.  Patient was alarmed by an episode of central chest discomfort that radiated to his left arm at around 830 this morning.  He says the episode was very brief lasting \"seconds\".  In the setting of a recent evaluation with a cardiologist and a CT angiogram he was told was abnormal patient became concerned and decided to come in.  Patient's wife also notes that he has complained of some episodes of feeling lightheaded and dizzy.  Patient is very active and is noted to have lost 75 pounds over the last few years.  He has never been a smoker.  He was previously on medication for blood pressure and cholesterol which were all discontinued after he had significant weight loss.  Patient says that he had been told his cholesterol was going up and that medication was suggested but the patient did not start any medication because he is very active.  Patient bicycles sometimes for 2 hours at a time.  Patient has an appointment with cardiology coming up to follow-up on the abnormal CT angio.  That he knew of the next step was going to be a stress test.  Patient was started on aspirin and statin recently.  Patient does acknowledge that he has a large hiatal hernia and underlying anxiety.    Review of prior external notes (non-ED) -and- Review of prior external test results outside of this encounter: I reviewed the patient's recent cardiology testing including a CT angiogram done on July 20.  The results of that study include concern for moderate multivessel " coronary artery disease with possibly severe distal right coronary artery disease within a small caliber vessel.  He was also noted to have a mildly enlarged right ventricle with slight wall motion abnormalities of the left ventricle.  Patient is noted to have an appointment actually tomorrow with cardiology.    Prescription drug monitoring program review:         PAST MEDICAL HISTORY  Active Ambulatory Problems     Diagnosis Date Noted    Hypogonadism in male 05/09/2016    Hypercholesterolemia 05/09/2016    Gastroesophageal reflux disease 05/09/2016    Paresthesia of lower extremity 05/09/2016    Thoracic back pain 05/09/2016    Erectile dysfunction of nonorganic origin 05/09/2016    Well adult exam 11/17/2016    Leukopenia 08/29/2018    History of colon polyps 09/07/2018    Medicare annual wellness visit, subsequent 01/19/2022    Community acquired pneumonia 04/29/2022    Pneumonia due to Streptococcus pneumoniae 04/30/2022    Influenza A 04/30/2022    Elevated d-dimer 05/02/2022    Bacteremia due to Streptococcus pneumoniae 05/02/2022    Annual physical exam 06/10/2022    Renal calculi 07/05/2023     Resolved Ambulatory Problems     Diagnosis Date Noted    Adiposity 05/09/2016    Essential hypertension 05/09/2016    Chest pain, atypical 02/24/2017    Depression with anxiety 03/24/2020    Dyspnea 04/30/2022     Past Medical History:   Diagnosis Date    Chest pain 04/06/2023    Coronary artery disease     Disc disorder     Enlarged prostate     History of exertional chest pain 05/2022    Hx of colonic polyp     Hyperlipidemia     Hypertension     Kidney stone 2007    Nephrolithiasis     Pneumonia 2022         PAST SURGICAL HISTORY  Past Surgical History:   Procedure Laterality Date    CATARACT EXTRACTION W/ INTRAOCULAR LENS IMPLANT Bilateral 2022    COLONOSCOPY N/A 12/10/2018    Procedure: COLONOSCOPY to cecuman ;  Surgeon: Jose Montgomery MD;  Location: [facility]COPY;  Service: Gastroenterology     "COLONOSCOPY W/ POLYPECTOMY      CYST REMOVAL Right 2012    wrist     CYSTOSCOPY      CYSTOSCOPY W/ LASER LITHOTRIPSY      EXTRACORPOREAL SHOCK WAVE LITHOTRIPSY (ESWL) Left 7/5/2023    Procedure: LEFT  SHOCKWAVE LITHOTRIPSY;  Surgeon: Abel Pinto MD;  Location: [facility] OR;  Service: Urology;  Laterality: Left;    KNEE ARTHROSCOPY Right 2015    KNEE SURGERY Right 01/13/2016         FAMILY HISTORY  Family History   Problem Relation Age of Onset    Stomach cancer Mother     Stroke Mother     Breast cancer Mother     Lung cancer Mother     Prostate cancer Father     Alzheimer's disease Father     Kidney disease Father     Heart disease Father         Triple bypass    Hypertension Father     No Known Problems Sister     Prostate cancer Brother 60    Obesity Brother     Sleep apnea Brother     Other Maternal Grandmother         Circulatory /heart problems    Heart attack Maternal Grandfather     Cancer Paternal Grandfather     Malig Hyperthermia Neg Hx          SOCIAL HISTORY  Social History     Socioeconomic History    Marital status:      Spouse name: Kimberly    Years of education: College   Tobacco Use    Smoking status: Never    Smokeless tobacco: Never   Vaping Use    Vaping Use: Never used   Substance and Sexual Activity    Alcohol use: Yes     Alcohol/week: 4.0 standard drinks     Types: 4 Cans of beer per week     Comment: \"4 beers on weekends\"     Drug use: No    Sexual activity: Yes     Partners: Female     Birth control/protection: Hysterectomy         ALLERGIES  Patient has no known allergies.        REVIEW OF SYSTEMS  Review of Systems  Included in HPI  All systems reviewed and negative except for those discussed in HPI.  No nausea no vomiting no diarrhea no shortness of breath      PHYSICAL EXAM    I have reviewed the triage vital signs and nursing notes.    ED Triage Vitals   Temp Heart Rate Resp BP SpO2   08/01/23 0916 08/01/23 0916 08/01/23 0916 08/01/23 0942 08/01/23 0916   97 øF (36.1 " øC) 103 18 136/85 98 %      Temp src Heart Rate Source Patient Position BP Location FiO2 (%)   -- -- 08/01/23 0942 08/01/23 0942 --     Sitting Right arm        Physical Exam  GENERAL: Well-appearing well-nourished conversant  male, alert, no acute distress  SKIN: Warm, dry  HENT: Normocephalic, atraumatic  Neck: No carotid bruits  EYES: no scleral icterus  CV: regular rhythm, regular rate, no murmurs gallops or rubs  RESPIRATORY: normal effort, lungs clear, no wheezes, crackles or rales  ABDOMEN: soft, nontender, nondistended  MUSCULOSKELETAL: no deformity, no lower extremity edema  NEURO: alert, moves all extremities, follows commands                                                               LAB RESULTS  Recent Results (from the past 24 hour(s))   ECG 12 Lead Chest Pain    Collection Time: 08/01/23  9:21 AM   Result Value Ref Range    QT Interval 341 ms   Comprehensive Metabolic Panel    Collection Time: 08/01/23  9:47 AM    Specimen: Blood   Result Value Ref Range    Glucose 102 (H) 65 - 99 mg/dL    BUN 14 8 - 23 mg/dL    Creatinine 0.90 0.76 - 1.27 mg/dL    Sodium 139 136 - 145 mmol/L    Potassium 4.2 3.5 - 5.2 mmol/L    Chloride 103 98 - 107 mmol/L    CO2 26.0 22.0 - 29.0 mmol/L    Calcium 9.2 8.6 - 10.5 mg/dL    Total Protein 6.6 6.0 - 8.5 g/dL    Albumin 4.7 3.5 - 5.2 g/dL    ALT (SGPT) 10 1 - 41 U/L    AST (SGOT) 17 1 - 40 U/L    Alkaline Phosphatase 58 39 - 117 U/L    Total Bilirubin 1.5 (H) 0.0 - 1.2 mg/dL    Globulin 1.9 gm/dL    A/G Ratio 2.5 g/dL    BUN/Creatinine Ratio 15.6 7.0 - 25.0    Anion Gap 10.0 5.0 - 15.0 mmol/L    eGFR 92.5 >60.0 mL/min/1.73   High Sensitivity Troponin T    Collection Time: 08/01/23  9:47 AM    Specimen: Blood   Result Value Ref Range    HS Troponin T 9 <15 ng/L   Green Top (Gel)    Collection Time: 08/01/23  9:47 AM   Result Value Ref Range    Extra Tube Hold for add-ons.    Lavender Top    Collection Time: 08/01/23  9:47 AM   Result Value Ref Range    Extra Tube  hold for add-on    Light Blue Top    Collection Time: 08/01/23  9:47 AM   Result Value Ref Range    Extra Tube Hold for add-ons.    CBC Auto Differential    Collection Time: 08/01/23  9:47 AM    Specimen: Blood   Result Value Ref Range    WBC 4.18 3.40 - 10.80 10*3/mm3    RBC 5.06 4.14 - 5.80 10*6/mm3    Hemoglobin 16.5 13.0 - 17.7 g/dL    Hematocrit 47.3 37.5 - 51.0 %    MCV 93.5 79.0 - 97.0 fL    MCH 32.6 26.6 - 33.0 pg    MCHC 34.9 31.5 - 35.7 g/dL    RDW 11.7 (L) 12.3 - 15.4 %    RDW-SD 40.0 37.0 - 54.0 fl    MPV 8.9 6.0 - 12.0 fL    Platelets 150 140 - 450 10*3/mm3    Neutrophil % 75.3 42.7 - 76.0 %    Lymphocyte % 15.1 (L) 19.6 - 45.3 %    Monocyte % 7.9 5.0 - 12.0 %    Eosinophil % 0.2 (L) 0.3 - 6.2 %    Basophil % 1.0 0.0 - 1.5 %    Immature Grans % 0.5 0.0 - 0.5 %    Neutrophils, Absolute 3.15 1.70 - 7.00 10*3/mm3    Lymphocytes, Absolute 0.63 (L) 0.70 - 3.10 10*3/mm3    Monocytes, Absolute 0.33 0.10 - 0.90 10*3/mm3    Eosinophils, Absolute 0.01 0.00 - 0.40 10*3/mm3    Basophils, Absolute 0.04 0.00 - 0.20 10*3/mm3    Immature Grans, Absolute 0.02 0.00 - 0.05 10*3/mm3    nRBC 0.0 0.0 - 0.2 /100 WBC   High Sensitivity Troponin T 2Hr    Collection Time: 08/01/23 11:51 AM    Specimen: Arm, Right; Blood   Result Value Ref Range    HS Troponin T 10 <15 ng/L    Troponin T Delta 1 >=-4 - <+4 ng/L       Ordered the above labs and independently reviewed the results.        RADIOLOGY  XR Chest 1 View    Result Date: 8/1/2023  XR CHEST 1 VW-  HISTORY: Male who is 67 years-old, Chest pain  TECHNIQUE: Frontal views of the chest  COMPARISON: 4/6/2023  FINDINGS: Heart, mediastinum and pulmonary vasculature are unremarkable. Mild hazy increased opacity at the right upper lung could be developing atelectasis or infiltrate, follow-up suggested. No pleural effusion, or pneumothorax. No acute osseous process.      Mild hazy increased opacity at the right upper lung could be developing atelectasis or infiltrate, follow-up  suggested.  This report was finalized on 8/1/2023 10:20 AM by Dr. Keegan Vásquez M.D.       I ordered the above noted radiological studies. Reviewed by me  See dictation for official radiology interpretation.    EKG          EKG time: 921  Rhythm/Rate: Sinus rhythm at a rate of 99  P waves and MS: Normal  QRS, axis: Normal  ST and T waves: No elevation of the ST segment and no T wave inversions   Interpreted Contemporaneously by me, independently viewed  Similar to previous in system from July 20        MEDICATIONS GIVEN IN ER    Medications   sodium chloride 0.9 % flush 10 mL (has no administration in time range)   aspirin tablet 325 mg (325 mg Oral Given 8/1/23 1125)         ORDERS PLACED DURING THIS VISIT:  Orders Placed This Encounter   Procedures    XR Chest 1 View    [place] Draw    Comprehensive Metabolic Panel    High Sensitivity Troponin T    CBC Auto Differential    High Sensitivity Troponin T 2Hr    NPO Diet NPO Type: Strict NPO    Undress & Gown    Continuous Pulse Oximetry    Obtain Informed Consent    LCG (on-call MD unless specified)    Oxygen Therapy- Nasal Cannula; Titrate 1-6 LPM Per SpO2; 90 - 95%    ECG 12 Lead Chest Pain    Insert Peripheral IV    CBC & Differential    Green Top (Gel)    Lavender Top    Gold Top - SST    Light Blue Top         PROGRESS, DATA ANALYSIS, CONSULTS, AND MEDICAL DECISION MAKING    All labs have been independently interpreted by me.  All radiology studies have been reviewed by me and discussed with radiologist dictating the report.   EKG's independently viewed and interpreted by me.  Discussion below represents my analysis of pertinent findings related to patient's condition, differential diagnosis, treatment plan and final disposition.    Differential diagnosis includes but is not limited to ACS/unstable angina given patient's recent cardiology evaluation and abnormal CT angiogram.  Some of patient's pain is exertional.  Patient does deny any shortness of breath  leaving pulmonary emboli and pneumonia and pneumothorax less likely.  Patient's pain is also not pleuritic in nature.  Patient does admit having a hiatal hernia so he could have a gastrointestinal reason for his chest pain.  Given patient's risk factors, heart score and exertional pain plan to discuss patient case with Dr. Amato who is on for cardiology today as well as patient's established cardiologist.    ED Course as of 08/01/23 1244   Tue Aug 01, 2023   1125 XR Chest 1 View [AR]   1126 XR Chest 1 View [AR]   1126 Chest x-ray reviewed and independently reviewed by myself with an interpretation significant for no cardiomegaly and no obvious infiltrates [AR]   1127 ECG 12 Lead Other; cardiac CTA [AR]   1146 ECG 12 Lead Chest Pain [AR]   1231 I discussed patient's case with his established cardiologist  who is reassured by patient's negative troponin.  Given his recent abnormal CT angio he feels patient would benefit from cardiac cath but not necessarily on an emergent basis.  Second troponin pending and cardiology plans to call back with a plan for either elective cath today or as an outpatient. [AR]   1240  called back with recommendations for patient to go to the Cath Lab. [AR]      ED Course User Index  [AR] Carolann Jose MD       I interpreted the cardiac monitor rhythm and my independent interpretation is: normal sinus rhythm, rate of 90s. The cardiac monitor was ordered to monitor for arrhythmias.        AS OF 12:44 EDT VITALS:    BP - 136/86  HR - 73  TEMP - 97 øF (36.1 øC)  O2 SATS - 97%        DIAGNOSIS  Final diagnoses:   Chest pain, unspecified type         DISPOSITION  ED Disposition       ED Disposition   Send to Cath Lab    Condition   --    Comment   Plan to go for elective cardiac cath                  Note Disclaimer: At [facility], we believe that sharing information builds trust and better relationships. You are receiving this note because you recently  visited [facility]. It is possible you will see health information before a provider has talked with you about it. This kind of information can be easy to misunderstand. To help you fully understand what it means for your health, we urge you to discuss this note with your provider.         Carolann Jose MD  08/01/23 8923

## 2023-08-01 NOTE — ED NOTES
"Nursing report ED to floor  Terrance Gresham  67 y.o.  male    HPI :   Chief Complaint   Patient presents with    Chest Pain       Admitting doctor:   No admitting provider for patient encounter.    Admitting diagnosis:   The encounter diagnosis was Chest pain, unspecified type.    Code status:   Current Code Status       Date Active Code Status Order ID Comments User Context       Prior            Allergies:   Patient has no known allergies.    Isolation:   No active isolations    Intake and Output  No intake or output data in the 24 hours ending 08/01/23 1347    Weight:       08/01/23  0941   Weight: 79.4 kg (175 lb)       Most recent vitals:   Vitals:    08/01/23 0941 08/01/23 0942 08/01/23 1141 08/01/23 1315   BP:  136/85 136/86 136/86   BP Location:  Right arm Right arm Right arm   Patient Position:  Sitting Lying Lying   Pulse:   73 88   Resp:   18 18   Temp:       SpO2:   97% 97%   Weight: 79.4 kg (175 lb)      Height: 180.3 cm (71\")          Active LDAs/IV Access:   Lines, Drains & Airways       Active LDAs       Name Placement date Placement time Site Days    Peripheral IV 08/01/23 1151 Right Antecubital 08/01/23  1151  Antecubital  less than 1                    Labs (abnormal labs have a star):   Labs Reviewed   COMPREHENSIVE METABOLIC PANEL - Abnormal; Notable for the following components:       Result Value    Glucose 102 (*)     Total Bilirubin 1.5 (*)     All other components within normal limits    Narrative:     GFR Normal >60  Chronic Kidney Disease <60  Kidney Failure <15     CBC WITH AUTO DIFFERENTIAL - Abnormal; Notable for the following components:    RDW 11.7 (*)     Lymphocyte % 15.1 (*)     Eosinophil % 0.2 (*)     Lymphocytes, Absolute 0.63 (*)     All other components within normal limits   TROPONIN - Normal    Narrative:     High Sensitive Troponin T Reference Range:  <10.0 ng/L- Negative Female for AMI  <15.0 ng/L- Negative Male for AMI  >=10 - Abnormal Female indicating possible " myocardial injury.  >=15 - Abnormal Male indicating possible myocardial injury.   Clinicians would have to utilize clinical acumen, EKG, Troponin, and serial changes to determine if it is an Acute Myocardial Infarction or myocardial injury due to an underlying chronic condition.        HIGH SENSITIVITIY TROPONIN T 2HR - Normal    Narrative:     High Sensitive Troponin T Reference Range:  <10.0 ng/L- Negative Female for AMI  <15.0 ng/L- Negative Male for AMI  >=10 - Abnormal Female indicating possible myocardial injury.  >=15 - Abnormal Male indicating possible myocardial injury.   Clinicians would have to utilize clinical acumen, EKG, Troponin, and serial changes to determine if it is an Acute Myocardial Infarction or myocardial injury due to an underlying chronic condition.        RAINBOW DRAW    Narrative:     The following orders were created for panel order [place] Draw.  Procedure                               Abnormality         Status                     ---------                               -----------         ------                     Green Top (Gel)[[id number]]                                  Final result               Lavender Top[[id number]]                                     Final result               Gold Top - SST[[id number]]                                                              Light Blue Top[[id number]]                                   Final result                 Please view results for these tests on the individual orders.   CBC AND DIFFERENTIAL    Narrative:     The following orders were created for panel order CBC & Differential.  Procedure                               Abnormality         Status                     ---------                               -----------         ------                     CBC Auto Differential[[id number]]        Abnormal            Final result                 Please view results for these tests on the individual orders.   GREEN TOP   LAVENDER TOP   LIGHT  "BLUE TOP   GOLD TOP - [facility]       EKG:   ECG 12 Lead Chest Pain   Preliminary Result   HEART RATE= 99  bpm   RR Interval= 606  ms   ME Interval= 178  ms   P Horizontal Axis= 22  deg   P Front Axis= 56  deg   QRSD Interval= 87  ms   QT Interval= 341  ms   QRS Axis= -51  deg   T Wave Axis= 62  deg   - ABNORMAL ECG -   Sinus rhythm   Left anterior fascicular block   Low voltage, extremity leads   Abnormal R-wave progression, early transition   Electronically Signed By:    Date and Time of Study: 2023-08-01 09:21:07          Meds given in ED:   Medications   sodium chloride 0.9 % flush 10 mL (has no administration in time range)   aspirin tablet 325 mg (325 mg Oral Given 8/1/23 1125)       Imaging results:  XR Chest 1 View    Result Date: 8/1/2023  Mild hazy increased opacity at the right upper lung could be developing atelectasis or infiltrate, follow-up suggested.  This report was finalized on 8/1/2023 10:20 AM by Dr. Keegan Vásquez M.D.       Ambulatory status:   Ad karis    Social issues:   Social History     Socioeconomic History    Marital status:      Spouse name: Kimberly    Years of education: College   Tobacco Use    Smoking status: Never    Smokeless tobacco: Never   Vaping Use    Vaping Use: Never used   Substance and Sexual Activity    Alcohol use: Yes     Alcohol/week: 4.0 standard drinks     Types: 4 Cans of beer per week     Comment: \"4 beers on weekends\"     Drug use: No    Sexual activity: Yes     Partners: Female     Birth control/protection: Hysterectomy       NIH Stroke Scale:       Unique Martel RN  08/01/23 13:47 EDT       "

## 2023-08-01 NOTE — DISCHARGE INSTRUCTIONS
"[facility]  [address] [place] [address]    Coronary Angiogram (Radial/Ulnar Approach) After Care    Refer to this sheet in the next few weeks. These instructions provide you with information on caring for yourself after your procedure. Your caregiver may also give you more specific instructions. Your treatment has been planned according to current medical practices, but problems sometimes occur. Call your caregiver if you have any problems or questions after your procedure.    Home Care Instructions:  You may shower the day after the procedure. Remove the bandage (dressing) and gently wash the site with plain soap and water. Gently pat the site dry. You may apply a band aid daily for 2 days if desired.    Do not apply powder or lotion to the site.  Do not submerge the affected site in water for 3 to 5 days or until the site is completely healed.   Do not lift, push or pull anything over 5 pounds for 5 days after your procedure or as directed by your physician.  As a reference, a gallon of milk weighs 8 pounds.   Inspect the site at least twice daily. You may notice some bruising at the site and it may be tender for 1 to 2 weeks.     Increase your fluid intake for the next 2 days.    Keep arm elevated for 24 hours. For the remainder of the day, keep your arm in "Pledge of Allegiance" position when up and about.     You may drive 24 hours after the procedure unless otherwise instructed by your caregiver.  Do not operate machinery or power tools for 24 hours.  A responsible adult should be with you for the first 24 hours after you arrive home. Do not make any important legal decisions or sign legal papers for 24 hours.  Do not drink alcohol for 24 hours.    Metformin or any medications containing Metformin should not be taken for 48 hours after your procedure.      Call Your Doctor if:   You have unusual pain at the radial/ulnar (wrist) site.  You have redness, warmth, swelling, or pain at the " radial/ulnar (wrist) site.  You have drainage (other than a small amount of blood on the dressing).  `You have chills or a fever > 101.  Your arm becomes pale or dark, cool, tingly, or numb.  You develop chest pain, shortness of breath, feel faint or pass out.    You have heavy bleeding from the site, hold pressure on the site for 20 minutes.  If the bleeding stops, apply a fresh bandage and call your cardiologist.  However, if you        continue to have bleeding, call 911 and continue to apply pressure to the site.   You have any symptoms of a stroke.  Remember BE FAST  B-balance. Sudden trouble walking or loss of balance.  E-eyes.  Sudden changes in how you see or a sudden onset of a very bad headache.   F-face. Sudden weakness or loss of feeling of the face or facial droop on one side.   A-arms Sudden weakness or numbness in one arm.  One arm drifts down if they are both held out in front of you. This happens suddenly and usually on one side of the body.   S-speech.  Sudden trouble speaking, slurred speech or trouble understanding what are saying.   T-time  Time to call emergency services.  Write down the symptoms and the time they started.       PATIENT IS NOT TO MOW THE LAWN FOR ONE WEEK

## 2023-08-01 NOTE — Clinical Note
Inserted under fluoro. Xolair Counseling:  Patient informed of potential adverse effects including but not limited to fever, muscle aches, rash and allergic reactions.  The patient verbalized understanding of the proper use and possible adverse effects of Xolair.  All of the patient's questions and concerns were addressed.

## 2023-08-01 NOTE — ED TRIAGE NOTES
Pt to ed from home via PV    Pt c/o intermittent cp x2 days. Pt describes pain as dull. Pt denies SOB. Pain radiated to arms and jaw. Pt hx artery blockage. Pt also c/o feeling lightheaded/dizzy when getting up

## 2023-08-01 NOTE — Clinical Note
Hemostasis started on the right radial artery. Radial compression device applied to vessel. Hemostasis achieved successfully. Closure device additional comment: Vasc band 14 cc air

## 2023-08-01 NOTE — CONSULTS
[facility] History and Physical       Encounter Date:23  Patient:Terrance Gresham  :1955  MRN:[id number]    Date of Admission: 2023  Date of Encounter Visit: 23  Encounter Provider: Ariel Landeros MD  Place of Service: [facility]  Patient Care Team:  Moose Chase MD as PCP - General (Internal Medicine)  Yaakov Hernandez MD as Consulting Physician (Hematology and Oncology)  Joao Amato Jr., MD as Consulting Physician (Cardiology)      Chief Complaint: Chest pain      History of Presenting Illness:      Mr. Gresham is a 67 y.o. gentleman with past medical history notable for hypertension and mixed hyperlipidemia who presents to the hospital with chest discomfort.  Of note patient was recently evaluated as an outpatient by one of our partners Dr. Preciado and at that juncture he underwent a coronary CTA which did identify a 50 to 75% proximal LAD segment stenoses as well as small vessel disease within a small caliber PDA.  He was notified of these results last week.    Unfortunately patient had acute onset of chest discomfort around 830 this morning.  It was sudden onset.  Came out of nowhere.  Fortunately was only brief lasting for a few seconds but was concerned about this given that he also recently had the news regarding his coronary disease.  Pain was central.  Nonradiating.  Was moderate in severity.  Has had some associated lightheadedness and dizziness.  Fortunately is remained chest pain-free since arrival      Review of Systems:  Review of Systems   Constitutional: Negative.   HENT: Negative.     Eyes: Negative.    Cardiovascular:  Positive for chest pain.   Respiratory: Negative.     Endocrine: Negative.    Hematologic/Lymphatic: Negative.    Skin: Negative.    Musculoskeletal: Negative.    Gastrointestinal: Negative.    Genitourinary: Negative.    Neurological: Negative.    Psychiatric/Behavioral: Negative.     Allergic/Immunologic:  "Negative.      Medications:  Prior to Admission medications    Medication Sig Start Date End Date Taking? Authorizing Provider   aspirin 81 MG EC tablet Take 1 tablet by mouth Daily. 7/25/23   Joao Amato Jr., MD   atorvastatin (LIPITOR) 20 MG tablet Take 1 tablet by mouth Daily. 7/25/23   Joao Amato Jr., MD   Cholecalciferol (VITAMIN D3) 2000 UNITS capsule Take 1 capsule by mouth Daily. 11/5/15   Serafin Linares MD   Cyanocobalamin (B-12 PO) Take 1,000 mcg by mouth 1 (One) Time Per Week.    Serafin Linares MD   HYDROcodone-acetaminophen (NORCO) 7.5-325 MG per tablet Take 1 tablet by mouth Every 6 (Six) Hours As Needed for Moderate Pain. 7/5/23   Abel Pinto MD   Omega-3 Fatty Acids (FISH OIL) 1200 MG capsule capsule Take 1 capsule by mouth 2 (Two) Times a Day. Hold for surgery 9/5/13   Serafin Linares MD   tamsulosin (FLOMAX) 0.4 MG capsule 24 hr capsule Take 1 capsule by mouth Daily. 6/16/23   Caesar Lee MD       No Known Allergies    Past Medical History:   Diagnosis Date    Chest pain 04/06/2023    came through [facility] ED..  followed up with  on 5/10/23. Coronary angiogram on 7/20 showed \"moderate to severe stenosis in the proximal LAD\" pt to start aspirin and statin.    Coronary artery disease     Disc disorder     Bulging    Enlarged prostate     History of exertional chest pain 05/2022    Hx of colonic polyp     Hyperlipidemia     Hypertension     Kidney stone 2007    Leukopenia     negative liver scan    Nephrolithiasis     Pneumonia 2022    SEPTIC    Renal calculi        Past Surgical History:   Procedure Laterality Date    CATARACT EXTRACTION W/ INTRAOCULAR LENS IMPLANT Bilateral 2022    COLONOSCOPY N/A 12/10/2018    Procedure: COLONOSCOPY to cecuman ti;  Surgeon: Jose Montgomery MD;  Location: [facility] ENDOSCOPY;  Service: Gastroenterology    COLONOSCOPY W/ POLYPECTOMY      CYST REMOVAL Right 2012    wrist     CYSTOSCOPY      CYSTOSCOPY W/ LASER LITHOTRIPSY " "     EXTRACORPOREAL SHOCK WAVE LITHOTRIPSY (ESWL) Left 7/5/2023    Procedure: LEFT  SHOCKWAVE LITHOTRIPSY;  Surgeon: Abel Pinto MD;  Location: [facility];  Service: Urology;  Laterality: Left;    KNEE ARTHROSCOPY Right 2015    KNEE SURGERY Right 01/13/2016       Social History     Socioeconomic History    Marital status:      Spouse name: Kimberly    Years of education: College   Tobacco Use    Smoking status: Never    Smokeless tobacco: Never   Vaping Use    Vaping Use: Never used   Substance and Sexual Activity    Alcohol use: Yes     Alcohol/week: 4.0 standard drinks     Types: 4 Cans of beer per week     Comment: \"4 beers on weekends\"     Drug use: No    Sexual activity: Yes     Partners: Female     Birth control/protection: Hysterectomy       Family History   Problem Relation Age of Onset    Stomach cancer Mother     Stroke Mother     Breast cancer Mother     Lung cancer Mother     Prostate cancer Father     Alzheimer's disease Father     Kidney disease Father     Heart disease Father         Triple bypass    Hypertension Father     No Known Problems Sister     Prostate cancer Brother 60    Obesity Brother     Sleep apnea Brother     Other Maternal Grandmother         Circulatory /heart problems    Heart attack Maternal Grandfather     Cancer Paternal Grandfather     Malig Hyperthermia Neg Hx          The following portions of the patient's history were reviewed and updated as appropriate: allergies, current medications, past family history, past medical history, past social history, past surgical history and problem list.         Objective:      Temp:  [97 øF (36.1 øC)] 97 øF (36.1 øC)  Heart Rate:  [] 88  Resp:  [18] 18  BP: (136)/(85-86) 136/86   No intake or output data in the 24 hours ending 08/01/23 1323  Body mass index is 24.41 kg/mý.      08/01/23  0941   Weight: 79.4 kg (175 lb)           Physical Exam:  Constitutional: Well appearing, well developed, no acute distress   HENT: " Oropharynx clear and membrane moist  Eyes: Normal conjunctiva, no sclera icterus.  Neck: Supple, no carotid bruit bilaterally.  Cardiovascular: Regular rate and rhythm, No Murmur, No bilateral lower extremity edema.  Pulmonary: Normal respiratory effort, normal lung sounds, no wheezing.  Abdominal: Soft, nontender, no hepatosplenomegaly, liver is non-pulsatile.  Neurological: Alert and orient x 3.   Skin: Warm, dry, no ecchymosis, no rash.  Psych: Appropriate mood and affect. Normal judgment and insight.        Lab Review:   Results from last 7 days   Lab Units 08/01/23  0947   SODIUM mmol/L 139   POTASSIUM mmol/L 4.2   CHLORIDE mmol/L 103   CO2 mmol/L 26.0   BUN mg/dL 14   CREATININE mg/dL 0.90   GLUCOSE mg/dL 102*   CALCIUM mg/dL 9.2   AST (SGOT) U/L 17   ALT (SGPT) U/L 10     Results from last 7 days   Lab Units 08/01/23  1151 08/01/23  0947   HSTROP T ng/L 10 9     Results from last 7 days   Lab Units 08/01/23  0947   WBC 10*3/mm3 4.18   HEMOGLOBIN g/dL 16.5   HEMATOCRIT % 47.3   PLATELETS 10*3/mm3 150                   Invalid input(s): LDLCALC  The 10-year ASCVD risk score (Alison MOONEY, et al., 2019) is: 17.8%    Values used to calculate the score:      Age: 67 years      Sex: Male      Is Non- : No      Diabetic: No      Tobacco smoker: No      Systolic Blood Pressure: 136 mmHg      Is BP treated: No      HDL Cholesterol: 66 mg/dL      Total Cholesterol: 233 mg/dL               Coronary CTA 7/25/2023:  LM: <10% calcified proximal left main stenosis.   LAD: Left anterior descending artery(LAD) is a large-caliber vessel and wraps around the apex of the heart. There is evidence of atherosclerotic disease, 25% calcified noncalcified ostial stenosis, 50-75% calcified proximal LAD stenosis, 50% calcified noncalcified mid LAD stenosis.   Ramus: The ramus intermedius is a small caliber vessel with >50% calcified and noncalcified stenosis in the proximal vessel.   Lcx: The circumflex artery is  the nondominant vessel with evidence of atherosclerotic disease, <25% calcified scattered stenosis.   RCA: The right coronary artery is the dominant vessel with evidence of atherosclerotic disease, <25% scattered calcified stenosis,>50% calcified noncalcified mid RCA stenosis, distal vessel is small caliber with small caliber PDA-likely scattered noncalcified atherosclerosis throughout the vessel.         Assessment:           * No active hospital problems. *         Plan:       Mr. Gresham is a 67 y.o. gentleman with past medical history notable for hypertension and mixed hyperlipidemia who presents to the hospital with chest discomfort.  Known coronary disease is felt that proceeding forth cardiac catheterization would be our next best step after further discussion with his primary cardiologist Dr. Umana.    Coronary artery disease with stable angina:  Given acute and worsening chest pain we will further evaluate coronary angiogram               Ariel Landeros MD  [facility]  08/01/23  13:23 EDT

## 2023-08-09 ENCOUNTER — OFFICE VISIT (OUTPATIENT)
Dept: CARDIOLOGY | Facility: CLINIC | Age: 68
End: 2023-08-09
Payer: MEDICARE

## 2023-08-09 VITALS
WEIGHT: 170.2 LBS | OXYGEN SATURATION: 97 % | HEIGHT: 71 IN | HEART RATE: 78 BPM | SYSTOLIC BLOOD PRESSURE: 130 MMHG | BODY MASS INDEX: 23.83 KG/M2 | DIASTOLIC BLOOD PRESSURE: 82 MMHG

## 2023-08-09 DIAGNOSIS — R07.9 CHEST PAIN, UNSPECIFIED TYPE: Primary | ICD-10-CM

## 2023-08-09 DIAGNOSIS — I25.10 CORONARY ARTERY DISEASE INVOLVING NATIVE CORONARY ARTERY OF NATIVE HEART WITHOUT ANGINA PECTORIS: ICD-10-CM

## 2023-08-09 NOTE — PROGRESS NOTES
[facility]      Patient Name: Terrance Gresham  :1955  Age: 67 y.o.  Encounter Provider:  Joao Amato Jr, MD      Chief Complaint: Initial evaluation chest pain      Chest Pain   Pertinent negatives include no abdominal pain, cough, dizziness, fever, near-syncope, orthopnea, palpitations, PND or syncope.     Terrance Gresham is a 67 y.o. male previous history of hypertension and dyslipidemia who was on medications until he lost a significant amount of weight not currently on medications and doing well until he had some chest pain about a month ago presents for follow-up evaluation.     Last clinic visit note: He presented for atypical chest pain in 2017 and saw Dr. Keegan Reid.  A nuclear stress study was done at that point in time that showed no evidence of myocardial ischemia.  About a month ago he had a severe episode of left-sided chest discomfort and was seen in the ER with a benign work-up.  He presents today for evaluation.  He exercises daily with fleeting episodes of chest discomfort that do not limit physical activity.  No orthopnea, PND or edema.  No palpitations, dizziness or syncope.  He wears an Apple watch daily and denies any high heart rate alerts.  He has some occasional dizziness when he is riding the bicycle but no episodes of syncope.  As stated above he was previously hypertensive with significant dyslipidemia but this all resolved after losing 70 pounds.  He does have a family history of coronary artery disease with his father having CABG at age 60.  He is a lifelong non-smoker drinks socially and denies illicit drug use.    CT coronary angiogram showed calcium score of 400 with questionable obstructive disease in the proximal to mid LAD.  He had diffuse atherosclerosis but otherwise mild nonobstructive disease.  We decided to start medical therapy but a few days later he came into the ER with more chest pain and concern for symptoms.  He was taken to  "the cardiac catheterization lab and found to have mild nonobstructive disease.  He is very anxious about his cardiac diagnosis despite getting a good report on cardiac catheterization.  He is slowly getting back into activity.  No cardiac complaints at time of interview.    The following portions of the patient's history were reviewed and updated as appropriate: allergies, current medications, past family history, past medical history, past social history, past surgical history and problem list.      Review of Systems   Constitutional: Negative for chills and fever.   HENT:  Negative for hoarse voice and sore throat.    Eyes:  Negative for double vision and photophobia.   Cardiovascular:  Positive for chest pain. Negative for leg swelling, near-syncope, orthopnea, palpitations, paroxysmal nocturnal dyspnea and syncope.   Respiratory:  Negative for cough and wheezing.    Skin:  Negative for poor wound healing and rash.   Musculoskeletal:  Negative for arthritis and joint swelling.   Gastrointestinal:  Negative for bloating, abdominal pain, hematemesis and hematochezia.   Neurological:  Negative for dizziness and focal weakness.   Psychiatric/Behavioral:  Negative for depression and suicidal ideas.        OBJECTIVE:   Vital Signs  Vitals:    08/09/23 1120   BP: 130/82   Pulse: 78   SpO2: 97%     Estimated body mass index is 23.74 kg/mý as calculated from the following:    Height as of this encounter: 180.3 cm (71\").    Weight as of this encounter: 77.2 kg (170 lb 3.2 oz).    Vitals reviewed.   Constitutional:       Appearance: Healthy appearance. Not in distress.   Neck:      Vascular: No JVR. JVD normal.   Pulmonary:      Effort: Pulmonary effort is normal.      Breath sounds: Normal breath sounds. No wheezing. No rhonchi. No rales.   Chest:      Chest wall: Not tender to palpatation.   Cardiovascular:      PMI at left midclavicular line. Normal rate. Regular rhythm. Normal S1. Normal S2.       Murmurs: There is no " murmur.      No gallop.  No click. No rub.   Pulses:     Intact distal pulses.   Edema:     Peripheral edema absent.   Abdominal:      General: Bowel sounds are normal.      Palpations: Abdomen is soft.      Tenderness: There is no abdominal tenderness.   Musculoskeletal: Normal range of motion.         General: No tenderness. Skin:     General: Skin is warm and dry.   Neurological:      General: No focal deficit present.      Mental Status: Alert and oriented to person, place and time.       Procedures      Lipid Panel          5/17/2023    08:20   Lipid Panel   Total Cholesterol 233    Triglycerides 111    HDL Cholesterol 66    VLDL Cholesterol 19    LDL Cholesterol  148         BUN   Date Value Ref Range Status   08/01/2023 14 8 - 23 mg/dL Final     Creatinine   Date Value Ref Range Status   08/01/2023 0.90 0.76 - 1.27 mg/dL Final   07/20/2023 0.90 0.60 - 1.30 mg/dL Final     Comment:     Serial Number: [id number]:  [id number]     Potassium   Date Value Ref Range Status   08/01/2023 4.2 3.5 - 5.2 mmol/L Final     Comment:     Slight hemolysis detected by analyzer. Results may be affected.     ALT (SGPT)   Date Value Ref Range Status   08/01/2023 10 1 - 41 U/L Final     AST (SGOT)   Date Value Ref Range Status   08/01/2023 17 1 - 40 U/L Final           ASSESSMENT:     67-year-old male with family history of CAD who presents for evaluation of chest pain    PLAN OF CARE:     Nonobstructive coronary artery disease -continue aspirin and statin.  Aspirin may be held for lithotripsy.  Low risk of periprocedural MACE.  Elevated blood pressure without diagnosis of hypertension -controlled today in clinic.  Patient had history of hypertension on medication until losing weight.  Continue sodium restricted diet.  Family history coronary artery disease -we will get a coronary calcium score from the CT coronary angiogram which will help us to understand whether or not he needs to be on medications to reduce cardiovascular  risk.    Return to clinic 6             Discharge Medications            Accurate as of August 9, 2023 11:23 AM. If you have any questions, ask your nurse or doctor.                Continue These Medications        Instructions Start Date   aspirin 81 MG EC tablet   81 mg, Oral, Daily      atorvastatin 20 MG tablet  Commonly known as: LIPITOR   20 mg, Oral, Daily      B-12 PO   1,000 mcg, Oral, Weekly      fish oil 1200 MG capsule capsule   1,200 mg, Oral, 2 Times Daily, Hold for surgery      HYDROcodone-acetaminophen 7.5-325 MG per tablet  Commonly known as: NORCO   1 tablet, Oral, Every 6 Hours PRN      metoprolol tartrate 25 MG tablet  Commonly known as: LOPRESSOR   25 mg, Oral, 2 Times Daily      tamsulosin 0.4 MG capsule 24 hr capsule  Commonly known as: FLOMAX   0.4 mg, Oral, Daily      Vitamin D3 50 MCG (2000 UT) capsule   2,000 Units, Oral, Daily               Thank you for allowing me to participate in the care of your patient,      Sincerely,   Joao Amato MD  [facility]  08/09/23  11:23 EDT

## 2023-12-06 DIAGNOSIS — Z12.5 SCREENING PSA (PROSTATE SPECIFIC ANTIGEN): ICD-10-CM

## 2023-12-06 DIAGNOSIS — E78.00 HYPERCHOLESTEROLEMIA: Primary | ICD-10-CM

## 2023-12-12 LAB
ALBUMIN SERPL-MCNC: 4.4 G/DL (ref 3.9–4.9)
ALBUMIN/GLOB SERPL: 2.4 {RATIO} (ref 1.2–2.2)
ALP SERPL-CCNC: 49 IU/L (ref 44–121)
ALT SERPL-CCNC: 14 IU/L (ref 0–44)
APPEARANCE UR: CLEAR
AST SERPL-CCNC: 21 IU/L (ref 0–40)
BACTERIA #/AREA URNS HPF: ABNORMAL /[HPF]
BILIRUB SERPL-MCNC: 1.1 MG/DL (ref 0–1.2)
BILIRUB UR QL STRIP: NEGATIVE
BUN SERPL-MCNC: 15 MG/DL (ref 8–27)
BUN/CREAT SERPL: 14 (ref 10–24)
CALCIUM SERPL-MCNC: 9.3 MG/DL (ref 8.6–10.2)
CASTS URNS QL MICRO: ABNORMAL /LPF
CHLORIDE SERPL-SCNC: 103 MMOL/L (ref 96–106)
CHOLEST SERPL-MCNC: 158 MG/DL (ref 100–199)
CO2 SERPL-SCNC: 26 MMOL/L (ref 20–29)
COLOR UR: YELLOW
CREAT SERPL-MCNC: 1.08 MG/DL (ref 0.76–1.27)
EGFRCR SERPLBLD CKD-EPI 2021: 74 ML/MIN/1.73
EPI CELLS #/AREA URNS HPF: ABNORMAL /HPF (ref 0–10)
GLOBULIN SER CALC-MCNC: 1.8 G/DL (ref 1.5–4.5)
GLUCOSE SERPL-MCNC: 88 MG/DL (ref 70–99)
GLUCOSE UR QL STRIP: NEGATIVE
HDLC SERPL-MCNC: 70 MG/DL
HGB UR QL STRIP: ABNORMAL
KETONES UR QL STRIP: NEGATIVE
LDLC SERPL CALC-MCNC: 76 MG/DL (ref 0–99)
LDLC/HDLC SERPL: 1.1 RATIO (ref 0–3.6)
LEUKOCYTE ESTERASE UR QL STRIP: NEGATIVE
MICRO URNS: ABNORMAL
NITRITE UR QL STRIP: NEGATIVE
PH UR STRIP: 5.5 [PH] (ref 5–7.5)
POTASSIUM SERPL-SCNC: 4.4 MMOL/L (ref 3.5–5.2)
PROT SERPL-MCNC: 6.2 G/DL (ref 6–8.5)
PROT UR QL STRIP: NEGATIVE
PSA SERPL-MCNC: 0.8 NG/ML (ref 0–4)
RBC #/AREA URNS HPF: ABNORMAL /HPF (ref 0–2)
SODIUM SERPL-SCNC: 140 MMOL/L (ref 134–144)
SP GR UR STRIP: 1.02 (ref 1–1.03)
T4 FREE SERPL-MCNC: 1.08 NG/DL (ref 0.82–1.77)
TRIGL SERPL-MCNC: 61 MG/DL (ref 0–149)
TSH SERPL DL<=0.005 MIU/L-ACNC: 2.9 UIU/ML (ref 0.45–4.5)
UROBILINOGEN UR STRIP-MCNC: 0.2 MG/DL (ref 0.2–1)
VLDLC SERPL CALC-MCNC: 12 MG/DL (ref 5–40)
WBC #/AREA URNS HPF: ABNORMAL /HPF (ref 0–5)

## 2024-01-12 ENCOUNTER — OFFICE VISIT (OUTPATIENT)
Dept: FAMILY MEDICINE CLINIC | Facility: CLINIC | Age: 69
End: 2024-01-12
Payer: MEDICARE

## 2024-01-12 VITALS
SYSTOLIC BLOOD PRESSURE: 110 MMHG | TEMPERATURE: 96.8 F | HEIGHT: 71 IN | WEIGHT: 178.2 LBS | BODY MASS INDEX: 24.95 KG/M2 | HEART RATE: 56 BPM | DIASTOLIC BLOOD PRESSURE: 78 MMHG | RESPIRATION RATE: 15 BRPM | OXYGEN SATURATION: 98 %

## 2024-01-12 DIAGNOSIS — I25.119 ATHEROSCLEROSIS OF NATIVE CORONARY ARTERY OF NATIVE HEART WITH ANGINA PECTORIS: ICD-10-CM

## 2024-01-12 DIAGNOSIS — E78.00 HYPERCHOLESTEROLEMIA: Primary | ICD-10-CM

## 2024-01-12 PROCEDURE — 99213 OFFICE O/P EST LOW 20 MIN: CPT | Performed by: INTERNAL MEDICINE

## 2024-01-12 NOTE — PROGRESS NOTES
"Acacia Gresham is a 68 y.o. male. Patient is here today for   Chief Complaint   Patient presents with    Hyperlipidemia          Vitals:    01/12/24 1017   BP: 110/78   Pulse: 56   Resp: 15   Temp: 96.8 °F (36 °C)   SpO2: 98%     Body mass index is 24.87 kg/m².      Past Medical History:   Diagnosis Date    Chest pain 04/06/2023    came through [facility] ED..  followed up with  on 5/10/23. Coronary angiogram on 7/20 showed \"moderate to severe stenosis in the proximal LAD\" pt to start aspirin and statin.    Coronary artery disease     Disc disorder     Bulging    Enlarged prostate     History of exertional chest pain 05/2022    Hx of colonic polyp     Hyperlipidemia     Hypertension     Kidney stone 2007    Leukopenia     negative liver scan    Nephrolithiasis     Pneumonia 2022    SEPTIC    Renal calculi       No Known Allergies   Social History     Socioeconomic History    Marital status:      Spouse name: Kimberly    Years of education: College   Tobacco Use    Smoking status: Never    Smokeless tobacco: Never   Vaping Use    Vaping Use: Never used   Substance and Sexual Activity    Alcohol use: Yes     Alcohol/week: 4.0 standard drinks of alcohol     Types: 4 Cans of beer per week     Comment: \"4 beers on weekends\" - \"rare\"    Drug use: No    Sexual activity: Yes     Partners: Female     Birth control/protection: Hysterectomy        Current Outpatient Medications:     aspirin 81 MG EC tablet, Take 1 tablet by mouth Daily., Disp: 90 tablet, Rfl: 3    atorvastatin (LIPITOR) 20 MG tablet, Take 1 tablet by mouth Daily., Disp: 90 tablet, Rfl: 3    Cholecalciferol (VITAMIN D3) 2000 UNITS capsule, Take 1 capsule by mouth Daily., Disp: , Rfl:     Cyanocobalamin (B-12 PO), Take 1,000 mcg by mouth 1 (One) Time Per Week., Disp: , Rfl:     metoprolol tartrate (LOPRESSOR) 25 MG tablet, Take 1 tablet by mouth 2 (Two) Times a Day., Disp: 60 tablet, Rfl: 11    Omega-3 Fatty Acids (FISH OIL) 1200 MG " capsule capsule, Take 1 capsule by mouth 2 (Two) Times a Day. Hold for surgery, Disp: , Rfl:      Objective     History of Present Illness  This patient is here to follow-up on labs from last week.    Since I saw him last he has had a cardiac evaluation which included a cardiac cath. His cardiac catheterization revealed a right dominance.  Left main angiographically normal, left anterior descending with a focal 20 to 30% subacute stenosis of the proximal and mid segments otherwise irregularities throughout supplying the proximal diagonal branch.  Daily ED  He had no other abnormalities.    He had had some anginal symptoms and his cardiologist put him on metoprolol.  He has not had any further episodes.      Hyperlipidemia  Pertinent negatives include no chest pain.        Review of Systems   Constitutional: Negative.    HENT: Negative.     Respiratory: Negative.     Cardiovascular: Negative.  Negative for chest pain.   Musculoskeletal: Negative.    Psychiatric/Behavioral: Negative.         Physical Exam  Vitals and nursing note reviewed.   Constitutional:       Appearance: Normal appearance.   Cardiovascular:      Rate and Rhythm: Regular rhythm.      Heart sounds: Normal heart sounds. No murmur heard.     No gallop.   Pulmonary:      Effort: No respiratory distress.      Breath sounds: Normal breath sounds. No wheezing or rales.   Neurological:      Mental Status: He is oriented to person, place, and time.   Psychiatric:         Mood and Affect: Mood normal.         Behavior: Behavior normal.         Thought Content: Thought content normal.           Problems Addressed this Visit          Cardiac and Vasculature    Hypercholesterolemia - Primary    Atherosclerosis of native coronary artery of native heart with angina pectoris     Diagnoses         Codes Comments    Hypercholesterolemia    -  Primary ICD-10-CM: E78.00  ICD-9-CM: 272.0     Atherosclerosis of native coronary artery of native heart with angina pectoris      ICD-10-CM: I25.119  ICD-9-CM: 414.01, 413.9               PLAN  His hypercholesterolemia is relatively well-controlled would like to see his LDL cholesterol lower.  His LDL cholesterol was 76 and his HDL cholesterol 70 on December 11.    He has single-vessel atherosclerosis.  He is asymptomatic on metoprolol.    I asked him to follow-up for a annual physical exam in about 6 months.  Fasting labs prior to visit should include: Lipid profile, comprehensive metabolic panel, CBC, urinalysis.  It would be reasonable to screen her for vitamin D deficiency as well.  No follow-ups on file.  Answers submitted by the patient for this visit:  Primary Reason for Visit (Submitted on 1/9/2024)  What is the primary reason for your visit?: Other  Other (Submitted on 1/9/2024)  Please describe your symptoms.: high cholesterol  Have you had these symptoms before?: Yes  How long have you been having these symptoms?: Greater than 2 weeks  Please list any medications you are currently taking for this condition.: atorvastatin  Please describe any probable cause for these symptoms. : diet

## 2024-02-09 ENCOUNTER — OFFICE VISIT (OUTPATIENT)
Dept: CARDIOLOGY | Facility: CLINIC | Age: 69
End: 2024-02-09
Payer: MEDICARE

## 2024-02-09 VITALS
DIASTOLIC BLOOD PRESSURE: 60 MMHG | HEART RATE: 60 BPM | HEIGHT: 71 IN | WEIGHT: 179 LBS | BODY MASS INDEX: 25.06 KG/M2 | SYSTOLIC BLOOD PRESSURE: 90 MMHG

## 2024-02-09 DIAGNOSIS — I25.10 CORONARY ARTERY DISEASE INVOLVING NATIVE CORONARY ARTERY OF NATIVE HEART WITHOUT ANGINA PECTORIS: Primary | ICD-10-CM

## 2024-02-09 DIAGNOSIS — Z82.49 FAMILY HISTORY OF CORONARY ARTERY DISEASE: ICD-10-CM

## 2024-02-09 PROCEDURE — 1160F RVW MEDS BY RX/DR IN RCRD: CPT | Performed by: NURSE PRACTITIONER

## 2024-02-09 PROCEDURE — 1159F MED LIST DOCD IN RCRD: CPT | Performed by: NURSE PRACTITIONER

## 2024-02-09 PROCEDURE — 93000 ELECTROCARDIOGRAM COMPLETE: CPT | Performed by: NURSE PRACTITIONER

## 2024-02-09 PROCEDURE — 99214 OFFICE O/P EST MOD 30 MIN: CPT | Performed by: NURSE PRACTITIONER

## 2024-02-09 NOTE — PROGRESS NOTES
Date of Office Visit: 24  Encounter Provider: JUANCHO Davis  Place of Service: [facility] CARDIOLOGY  Patient Name: Terrance Gresham  :1955    Chief Complaint   Patient presents with    Essential hypertension    Precordial pain    Hyperlipidemia    ATHEROSCLEROSIS OF CORONARY ARTERY   :     HPI: Terrance Gresham is a 68 y.o. male  with hypertension, hyperlipidemia, GERD, nonobstructive coronary artery disease,    He has family history of early chronic coronary artery disease with his father having bypass in his late 50s.  He was previously followed by Dr. Keegan Reid.  He is now followed by Dr. Joao Amato.  I will visit with him for the first time I have reviewed his medical record.    In 2017 he had echocardiogram showing normal left ventricular systolic function, mild concentric hypertrophy, mildly dilated right ventricular cavity, mild to moderately dilated left atrial cavity and no significant valve disease.  Perfusion stress test was normal.  He had a normal bilateral carotid artery duplex at that time as well  He reported exertional chest discomfort while riding his bike.  He had a coronary CTA 2023 which showed normal coronary anatomy with evidence of atherosclerotic disease, moderate multivessel coronary artery disease with possibly severe distal RCA lesion.  Calcium score was 912.  RV was mildly enlarged with slight wall motion abnormality as well.  He had invasive coronary angiogram 2023 which showed mild single-vessel coronary artery disease with a 20-30% proximal LAD lesion as well as a mid LAD segment stenosis with otherwise luminal irregularities.  He ultimately was treated with aspirin, atorvastatin and started on metoprolol tartrate.    He presents today for 6-month reassessment.  He rides his stationary bike every morning for an hour.  He is no longer having chest discomfort.  He states initially he felt a little lightheaded after  "starting metoprolol but now he no longer has that.  He denies chest pain or shortness of breath.  He had COVID 3 weeks ago and his blood pressure was a little more elevated at that time.  He states his blood pressure is usually 110/60 when he checks at home.  He reports left foot numbness which he has had intermittently for quite some time.  He believes that is getting a little worse.  He also tells me that he needs to reschedule lithotripsy with urology.    No Known Allergies        Family and social history reviewed.     ROS  All other systems were reviewed and are negative          Objective:     Vitals:    02/09/24 1008   BP: 90/60   BP Location: Left arm   Patient Position: Sitting   Pulse: 60   Weight: 81.2 kg (179 lb)   Height: 180.3 cm (71\")     Body mass index is 24.97 kg/m².    PHYSICAL EXAM:  Pulmonary:      Effort: Pulmonary effort is normal.      Breath sounds: Normal breath sounds.   Cardiovascular:      Normal rate. Regular rhythm.           ECG 12 Lead    Date/Time: 2/9/2024 10:44 AM  Performed by: Rajwinder Del Toro APRN    Authorized by: Rajwinder Del Toro APRN  Comparison: compared with previous ECG   Similar to previous ECG  Rhythm: sinus rhythm  Rate: normal  QRS axis: left    Clinical impression: abnormal EKG            Current Outpatient Medications   Medication Sig Dispense Refill    aspirin 81 MG EC tablet Take 1 tablet by mouth Daily. 90 tablet 3    atorvastatin (LIPITOR) 20 MG tablet Take 1 tablet by mouth Daily. 90 tablet 3    Cholecalciferol (VITAMIN D3) 2000 UNITS capsule Take 1 capsule by mouth Daily.      Cyanocobalamin (B-12 PO) Take 1,000 mcg by mouth 1 (One) Time Per Week.      metoprolol tartrate (LOPRESSOR) 25 MG tablet Take 1 tablet by mouth 2 (Two) Times a Day. 60 tablet 11    Omega-3 Fatty Acids (FISH OIL) 1200 MG capsule capsule Take 1 capsule by mouth 2 (Two) Times a Day. Hold for surgery       No current facility-administered medications for this visit.     Assessment:       " Diagnosis Plan   1. Coronary artery disease involving native coronary artery of native heart without angina pectoris  ECG 12 Lead      2. Family history of coronary artery disease  ECG 12 Lead           Orders Placed This Encounter   Procedures    ECG 12 Lead     This order was created via procedure documentation     Order Specific Question:   Release to patient     Answer:   Routine Release [[id number]]         Plan:   1.  68-year-old gentleman with nonobstructive coronary artery disease on cardiac catheterization August 2023.  He had a 20-30% proximal LAD and mid LAD segment stenosis with otherwise luminal irregularities.  He is continue aspirin, metoprolol tartrate 25 mg twice daily and atorvastatin 20 mg.  2.  Mixed hyperlipidemia on atorvastatin 20 mg and omega-3 fatty acid.  Target LDL is less than 70.  Reviewed his lipid panel from December 2023 showing LDL of 76.  Will continue current regimen  3. Normal Bilateral carotid duplex March 2017  4.  Left foot numbness-this is intermittent and has been present for a couple years.  He has palpable distal pulse.  I have asked him to discuss with his PCP.  5.History of kidney stones-he [facility].  He had a reportedly failed lithotripsy July 2023 but his repeat procedure had to be postponed due to need for further cardiac testing.  At this time, patient is at acceptable risk to have lithotripsy.  He may hold aspirin 3 to 5 days prior if needed and resume soon as possible postprocedure.      Follow-up in 6 months  Call with questions or concerns.           It has been a pleasure to participate in this patient's care.      Thank you,  JUANCHO Davis      **I used Dragon to dictate this note:**

## 2024-03-07 ENCOUNTER — DOCUMENTATION (OUTPATIENT)
Dept: FAMILY MEDICINE CLINIC | Facility: CLINIC | Age: 69
End: 2024-03-07
Payer: MEDICARE

## 2024-03-24 ENCOUNTER — APPOINTMENT (OUTPATIENT)
Dept: CT IMAGING | Facility: HOSPITAL | Age: 69
End: 2024-03-24
Payer: MEDICARE

## 2024-03-24 ENCOUNTER — HOSPITAL ENCOUNTER (EMERGENCY)
Facility: HOSPITAL | Age: 69
Discharge: HOME | End: 2024-03-24
Attending: EMERGENCY MEDICINE | Admitting: EMERGENCY MEDICINE
Payer: MEDICARE

## 2024-03-24 VITALS
WEIGHT: 175 LBS | HEART RATE: 89 BPM | OXYGEN SATURATION: 97 % | DIASTOLIC BLOOD PRESSURE: 63 MMHG | BODY MASS INDEX: 24.5 KG/M2 | HEIGHT: 71 IN | RESPIRATION RATE: 16 BRPM | TEMPERATURE: 97.1 F | SYSTOLIC BLOOD PRESSURE: 122 MMHG

## 2024-03-24 DIAGNOSIS — R50.9 FEVER, UNSPECIFIED FEVER CAUSE: Primary | ICD-10-CM

## 2024-03-24 DIAGNOSIS — Z96.0 URETERAL STENT PRESENT: ICD-10-CM

## 2024-03-24 LAB
ALBUMIN SERPL-MCNC: 4.3 G/DL (ref 3.5–5.2)
ALBUMIN/GLOB SERPL: 1.8 G/DL
ALP SERPL-CCNC: 53 U/L (ref 39–117)
ALT SERPL W P-5'-P-CCNC: 16 U/L (ref 1–41)
ANION GAP SERPL CALCULATED.3IONS-SCNC: 9 MMOL/L (ref 5–15)
AST SERPL-CCNC: 16 U/L (ref 1–40)
B PARAPERT DNA SPEC QL NAA+PROBE: NOT DETECTED
B PERT DNA SPEC QL NAA+PROBE: NOT DETECTED
BACTERIA UR QL AUTO: ABNORMAL /HPF
BASOPHILS # BLD AUTO: 0.04 10*3/MM3 (ref 0–0.2)
BASOPHILS NFR BLD AUTO: 0.5 % (ref 0–1.5)
BILIRUB SERPL-MCNC: 1.7 MG/DL (ref 0–1.2)
BILIRUB UR QL STRIP: NEGATIVE
BUN SERPL-MCNC: 15 MG/DL (ref 8–23)
BUN/CREAT SERPL: 13.4 (ref 7–25)
C PNEUM DNA NPH QL NAA+NON-PROBE: NOT DETECTED
CALCIUM SPEC-SCNC: 8.8 MG/DL (ref 8.6–10.5)
CHLORIDE SERPL-SCNC: 101 MMOL/L (ref 98–107)
CLARITY UR: CLEAR
CO2 SERPL-SCNC: 25 MMOL/L (ref 22–29)
COLOR UR: YELLOW
CREAT SERPL-MCNC: 1.12 MG/DL (ref 0.76–1.27)
D-LACTATE SERPL-SCNC: 1.5 MMOL/L (ref 0.5–2)
DEPRECATED RDW RBC AUTO: 40.8 FL (ref 37–54)
EGFRCR SERPLBLD CKD-EPI 2021: 70.7 ML/MIN/1.73
EOSINOPHIL # BLD AUTO: 0 10*3/MM3 (ref 0–0.4)
EOSINOPHIL NFR BLD AUTO: 0 % (ref 0.3–6.2)
ERYTHROCYTE [DISTWIDTH] IN BLOOD BY AUTOMATED COUNT: 11.8 % (ref 12.3–15.4)
FLUAV SUBTYP SPEC NAA+PROBE: NOT DETECTED
FLUBV RNA ISLT QL NAA+PROBE: NOT DETECTED
GLOBULIN UR ELPH-MCNC: 2.4 GM/DL
GLUCOSE SERPL-MCNC: 121 MG/DL (ref 65–99)
GLUCOSE UR STRIP-MCNC: NEGATIVE MG/DL
HADV DNA SPEC NAA+PROBE: NOT DETECTED
HCOV 229E RNA SPEC QL NAA+PROBE: NOT DETECTED
HCOV HKU1 RNA SPEC QL NAA+PROBE: NOT DETECTED
HCOV NL63 RNA SPEC QL NAA+PROBE: NOT DETECTED
HCOV OC43 RNA SPEC QL NAA+PROBE: NOT DETECTED
HCT VFR BLD AUTO: 44.8 % (ref 37.5–51)
HGB BLD-MCNC: 15 G/DL (ref 13–17.7)
HGB UR QL STRIP.AUTO: ABNORMAL
HMPV RNA NPH QL NAA+NON-PROBE: NOT DETECTED
HPIV1 RNA ISLT QL NAA+PROBE: NOT DETECTED
HPIV2 RNA SPEC QL NAA+PROBE: NOT DETECTED
HPIV3 RNA NPH QL NAA+PROBE: NOT DETECTED
HPIV4 P GENE NPH QL NAA+PROBE: NOT DETECTED
HYALINE CASTS UR QL AUTO: ABNORMAL /LPF
KETONES UR QL STRIP: NEGATIVE
LEUKOCYTE ESTERASE UR QL STRIP.AUTO: ABNORMAL
LYMPHOCYTES # BLD AUTO: 0.88 10*3/MM3 (ref 0.7–3.1)
LYMPHOCYTES NFR BLD AUTO: 10.9 % (ref 19.6–45.3)
M PNEUMO IGG SER IA-ACNC: NOT DETECTED
MCH RBC QN AUTO: 31.8 PG (ref 26.6–33)
MCHC RBC AUTO-ENTMCNC: 33.5 G/DL (ref 31.5–35.7)
MCV RBC AUTO: 94.9 FL (ref 79–97)
MONOCYTES # BLD AUTO: 0.87 10*3/MM3 (ref 0.1–0.9)
MONOCYTES NFR BLD AUTO: 10.8 % (ref 5–12)
NEUTROPHILS NFR BLD AUTO: 6.25 10*3/MM3 (ref 1.7–7)
NEUTROPHILS NFR BLD AUTO: 77.4 % (ref 42.7–76)
NITRITE UR QL STRIP: NEGATIVE
PH UR STRIP.AUTO: 5.5 [PH] (ref 5–8)
PLATELET # BLD AUTO: 124 10*3/MM3 (ref 140–450)
PMV BLD AUTO: 9 FL (ref 6–12)
POTASSIUM SERPL-SCNC: 3.8 MMOL/L (ref 3.5–5.2)
PROCALCITONIN SERPL-MCNC: 0.08 NG/ML (ref 0–0.25)
PROT SERPL-MCNC: 6.7 G/DL (ref 6–8.5)
PROT UR QL STRIP: ABNORMAL
RBC # BLD AUTO: 4.72 10*6/MM3 (ref 4.14–5.8)
RBC # UR STRIP: ABNORMAL /HPF
REF LAB TEST METHOD: ABNORMAL
RHINOVIRUS RNA SPEC NAA+PROBE: NOT DETECTED
RSV RNA NPH QL NAA+NON-PROBE: NOT DETECTED
SARS-COV-2 RNA NPH QL NAA+NON-PROBE: NOT DETECTED
SODIUM SERPL-SCNC: 135 MMOL/L (ref 136–145)
SP GR UR STRIP: 1.01 (ref 1–1.03)
SQUAMOUS #/AREA URNS HPF: ABNORMAL /HPF
UROBILINOGEN UR QL STRIP: ABNORMAL
WBC # UR STRIP: ABNORMAL /HPF
WBC NRBC COR # BLD AUTO: 8.07 10*3/MM3 (ref 3.4–10.8)

## 2024-03-24 PROCEDURE — 85025 COMPLETE CBC W/AUTO DIFF WBC: CPT | Performed by: EMERGENCY MEDICINE

## 2024-03-24 PROCEDURE — 87086 URINE CULTURE/COLONY COUNT: CPT | Performed by: EMERGENCY MEDICINE

## 2024-03-24 PROCEDURE — 83605 ASSAY OF LACTIC ACID: CPT | Performed by: EMERGENCY MEDICINE

## 2024-03-24 PROCEDURE — 81001 URINALYSIS AUTO W/SCOPE: CPT | Performed by: EMERGENCY MEDICINE

## 2024-03-24 PROCEDURE — 0202U NFCT DS 22 TRGT SARS-COV-2: CPT | Performed by: EMERGENCY MEDICINE

## 2024-03-24 PROCEDURE — 36415 COLL VENOUS BLD VENIPUNCTURE: CPT

## 2024-03-24 PROCEDURE — 99285 EMERGENCY DEPT VISIT HI MDM: CPT

## 2024-03-24 PROCEDURE — 87147 CULTURE TYPE IMMUNOLOGIC: CPT | Performed by: EMERGENCY MEDICINE

## 2024-03-24 PROCEDURE — 74177 CT ABD & PELVIS W/CONTRAST: CPT

## 2024-03-24 PROCEDURE — 80053 COMPREHEN METABOLIC PANEL: CPT | Performed by: EMERGENCY MEDICINE

## 2024-03-24 PROCEDURE — 84145 PROCALCITONIN (PCT): CPT | Performed by: EMERGENCY MEDICINE

## 2024-03-24 PROCEDURE — 25010000002 CEFTRIAXONE PER 250 MG: Performed by: EMERGENCY MEDICINE

## 2024-03-24 PROCEDURE — 87186 SC STD MICRODIL/AGAR DIL: CPT | Performed by: EMERGENCY MEDICINE

## 2024-03-24 PROCEDURE — 96365 THER/PROPH/DIAG IV INF INIT: CPT

## 2024-03-24 PROCEDURE — 25510000001 IOPAMIDOL 61 % SOLUTION: Performed by: EMERGENCY MEDICINE

## 2024-03-24 RX ORDER — CEPHALEXIN 500 MG/1
500 CAPSULE ORAL 3 TIMES DAILY
Qty: 21 CAPSULE | Refills: 0 | Status: SHIPPED | OUTPATIENT
Start: 2024-03-24

## 2024-03-24 RX ADMIN — CEFTRIAXONE SODIUM 1000 MG: 1 INJECTION, POWDER, FOR SOLUTION INTRAMUSCULAR; INTRAVENOUS at 14:39

## 2024-03-24 RX ADMIN — IOPAMIDOL 85 ML: 612 INJECTION, SOLUTION INTRAVENOUS at 13:33

## 2024-03-24 NOTE — ED PROVIDER NOTES
EMERGENCY DEPARTMENT ENCOUNTER    Room Number:  20/20  PCP: Moose Chase MD  Historian: Patient, spouse      HPI:  Chief Complaint: Fever  A complete HPI/ROS/PMH/PSH/SH/FH are unobtainable due to: Nothing  Context: Terrance Gresham is a 68 y.o. male with a medical history of CAD, hypertension, hyperlipidemia who presents to the ED c/o acute fever.  Patient complains of intermittent fever for the past 2 days.  He reports a Tmax of 102.  He had shaking chills last night.  Denies sore throat, runny nose, sinus congestion, cough, chest pain, shortness of breath, abdominal pain, vomiting, diarrhea, or sick contacts.  He had a left ureteral stent placed on March 6.  He has had some mild dysuria since then but reports this is unchanged.  Denies hematuria.  He took NyQuil earlier this morning.            PAST MEDICAL HISTORY  Active Ambulatory Problems     Diagnosis Date Noted    Hypogonadism in male 05/09/2016    Hypercholesterolemia 05/09/2016    Gastroesophageal reflux disease 05/09/2016    Paresthesia of lower extremity 05/09/2016    Thoracic back pain 05/09/2016    Erectile dysfunction of nonorganic origin 05/09/2016    Well adult exam 11/17/2016    Leukopenia 08/29/2018    History of colon polyps 09/07/2018    Medicare annual wellness visit, subsequent 01/19/2022    Community acquired pneumonia 04/29/2022    Pneumonia due to Streptococcus pneumoniae 04/30/2022    Influenza A 04/30/2022    Elevated d-dimer 05/02/2022    Bacteremia due to Streptococcus pneumoniae 05/02/2022    Annual physical exam 06/10/2022    Renal calculi 07/05/2023    Atherosclerosis of native coronary artery of native heart with angina pectoris 01/12/2024     Resolved Ambulatory Problems     Diagnosis Date Noted    Adiposity 05/09/2016    Essential hypertension 05/09/2016    Chest pain, atypical 02/24/2017    Depression with anxiety 03/24/2020    Dyspnea 04/30/2022     Past Medical History:   Diagnosis Date    Chest pain 04/06/2023     Coronary artery disease     Disc disorder     Enlarged prostate     History of exertional chest pain 05/2022    Hx of colonic polyp     Hyperlipidemia     Hypertension     Kidney stone 2007    Nephrolithiasis     Pneumonia 2022         PAST SURGICAL HISTORY  Past Surgical History:   Procedure Laterality Date    CARDIAC CATHETERIZATION N/A 8/1/2023    Procedure: Left Heart Cath;  Surgeon: Ariel Landeros MD;  Location: [facility] CATH INVASIVE LOCATION;  Service: Cardiovascular;  Laterality: N/A;    CARDIAC CATHETERIZATION N/A 8/1/2023    Procedure: Coronary angiography;  Surgeon: Ariel Landeros MD;  Location: [facility] CATH INVASIVE LOCATION;  Service: Cardiovascular;  Laterality: N/A;    CATARACT EXTRACTION W/ INTRAOCULAR LENS IMPLANT Bilateral 2022    COLONOSCOPY N/A 12/10/2018    Procedure: COLONOSCOPY to cecuman ti;  Surgeon: Jose Montgomery MD;  Location: [facility] ENDOSCOPY;  Service: Gastroenterology    COLONOSCOPY W/ POLYPECTOMY      CYST REMOVAL Right 2012    wrist     CYSTOSCOPY      CYSTOSCOPY W/ LASER LITHOTRIPSY      EXTRACORPOREAL SHOCK WAVE LITHOTRIPSY (ESWL) Left 7/5/2023    Procedure: LEFT  SHOCKWAVE LITHOTRIPSY;  Surgeon: Abel Pinto MD;  Location: [facility] OR;  Service: Urology;  Laterality: Left;    KNEE ARTHROSCOPY Right 2015    KNEE SURGERY Right 01/13/2016         FAMILY HISTORY  Family History   Problem Relation Age of Onset    Stomach cancer Mother     Stroke Mother     Breast cancer Mother     Lung cancer Mother     Prostate cancer Father     Alzheimer's disease Father     Kidney disease Father     Heart disease Father         Triple bypass    Hypertension Father     No Known Problems Sister     Prostate cancer Brother 60    Obesity Brother     Sleep apnea Brother     Other Maternal Grandmother         Circulatory /heart problems    Heart attack Maternal Grandfather     Cancer Paternal Grandfather     Malig Hyperthermia Neg Hx          SOCIAL HISTORY  Social History  "    Socioeconomic History    Marital status:      Spouse name: Kimberly    Years of education: College   Tobacco Use    Smoking status: Never     Passive exposure: Never (mother smoked in the home)    Smokeless tobacco: Never   Vaping Use    Vaping status: Never Used   Substance and Sexual Activity    Alcohol use: Yes     Alcohol/week: 4.0 standard drinks of alcohol     Types: 4 Cans of beer per week     Comment: \"4 beers on weekends\" - \"rare\"    Drug use: No    Sexual activity: Yes     Partners: Female     Birth control/protection: Hysterectomy         ALLERGIES  Patient has no known allergies.    REVIEW OF SYSTEMS  Review of Systems  Included in HPI  All systems reviewed and negative except for those discussed in HPI.      PHYSICAL EXAM  ED Triage Vitals   Temp Heart Rate Resp BP SpO2   03/24/24 1202 03/24/24 1202 03/24/24 1202 03/24/24 1206 03/24/24 1202   97.1 °F (36.2 °C) 79 16 139/92 92 %      Temp src Heart Rate Source Patient Position BP Location FiO2 (%)   -- -- -- -- --              Physical Exam      GENERAL: Awake, alert, oriented x 3.  Well-developed, well-nourished and nontoxic-appearing male.  Resting comfortably in no acute distress  HENT: NCAT, nares patent, oropharynx is benign  EYES: no scleral icterus  CV: regular rhythm, normal rate  RESPIRATORY: normal effort, clear to auscultation bilaterally  ABDOMEN: soft, nondistended, nontender, no CVA tenderness  MUSCULOSKELETAL: Extremities are nontender with full range of motion  NEURO: Speech is normal.  No facial droop.  Follows commands  PSYCH:  calm, cooperative  SKIN: warm, dry    Vital signs and nursing notes reviewed.          LAB RESULTS  Recent Results (from the past 24 hour(s))   Urinalysis With Microscopic If Indicated (No Culture) - Urine, Clean Catch    Collection Time: 03/24/24 12:16 PM    Specimen: Urine, Clean Catch   Result Value Ref Range    Color, UA Yellow Yellow, Straw    Appearance, UA Clear Clear    pH, UA 5.5 5.0 - 8.0    " Specific Gravity, UA 1.009 1.005 - 1.030    Glucose, UA Negative Negative    Ketones, UA Negative Negative    Bilirubin, UA Negative Negative    Blood, UA Small (1+) (A) Negative    Protein, UA Trace (A) Negative    Leuk Esterase, UA Trace (A) Negative    Nitrite, UA Negative Negative    Urobilinogen, UA 1.0 E.U./dL 0.2 - 1.0 E.U./dL   Urinalysis, Microscopic Only - Urine, Clean Catch    Collection Time: 03/24/24 12:16 PM    Specimen: Urine, Clean Catch   Result Value Ref Range    RBC, UA 0-2 None Seen, 0-2 /HPF    WBC, UA 6-10 (A) None Seen, 0-2 /HPF    Bacteria, UA 1+ (A) None Seen /HPF    Squamous Epithelial Cells, UA 0-2 None Seen, 0-2 /HPF    Hyaline Casts, UA None Seen None Seen /LPF    Methodology Automated Microscopy    Comprehensive Metabolic Panel    Collection Time: 03/24/24 12:20 PM    Specimen: Blood   Result Value Ref Range    Glucose 121 (H) 65 - 99 mg/dL    BUN 15 8 - 23 mg/dL    Creatinine 1.12 0.76 - 1.27 mg/dL    Sodium 135 (L) 136 - 145 mmol/L    Potassium 3.8 3.5 - 5.2 mmol/L    Chloride 101 98 - 107 mmol/L    CO2 25.0 22.0 - 29.0 mmol/L    Calcium 8.8 8.6 - 10.5 mg/dL    Total Protein 6.7 6.0 - 8.5 g/dL    Albumin 4.3 3.5 - 5.2 g/dL    ALT (SGPT) 16 1 - 41 U/L    AST (SGOT) 16 1 - 40 U/L    Alkaline Phosphatase 53 39 - 117 U/L    Total Bilirubin 1.7 (H) 0.0 - 1.2 mg/dL    Globulin 2.4 gm/dL    A/G Ratio 1.8 g/dL    BUN/Creatinine Ratio 13.4 7.0 - 25.0    Anion Gap 9.0 5.0 - 15.0 mmol/L    eGFR 70.7 >60.0 mL/min/1.73   Procalcitonin    Collection Time: 03/24/24 12:20 PM    Specimen: Blood   Result Value Ref Range    Procalcitonin 0.08 0.00 - 0.25 ng/mL   Lactic Acid, Plasma    Collection Time: 03/24/24 12:20 PM    Specimen: Blood   Result Value Ref Range    Lactate 1.5 0.5 - 2.0 mmol/L   CBC Auto Differential    Collection Time: 03/24/24 12:20 PM    Specimen: Blood   Result Value Ref Range    WBC 8.07 3.40 - 10.80 10*3/mm3    RBC 4.72 4.14 - 5.80 10*6/mm3    Hemoglobin 15.0 13.0 - 17.7 g/dL     Hematocrit 44.8 37.5 - 51.0 %    MCV 94.9 79.0 - 97.0 fL    MCH 31.8 26.6 - 33.0 pg    MCHC 33.5 31.5 - 35.7 g/dL    RDW 11.8 (L) 12.3 - 15.4 %    RDW-SD 40.8 37.0 - 54.0 fl    MPV 9.0 6.0 - 12.0 fL    Platelets 124 (L) 140 - 450 10*3/mm3    Neutrophil % 77.4 (H) 42.7 - 76.0 %    Lymphocyte % 10.9 (L) 19.6 - 45.3 %    Monocyte % 10.8 5.0 - 12.0 %    Eosinophil % 0.0 (L) 0.3 - 6.2 %    Basophil % 0.5 0.0 - 1.5 %    Neutrophils, Absolute 6.25 1.70 - 7.00 10*3/mm3    Lymphocytes, Absolute 0.88 0.70 - 3.10 10*3/mm3    Monocytes, Absolute 0.87 0.10 - 0.90 10*3/mm3    Eosinophils, Absolute 0.00 0.00 - 0.40 10*3/mm3    Basophils, Absolute 0.04 0.00 - 0.20 10*3/mm3   Respiratory Panel PCR w/COVID-19(SARS-CoV-2) HARVEY/NEEL/ISABEL/PAD/COR/LISSY In-House, NP Swab in [facility], 2 HR TAT - Swab, Nasopharynx    Collection Time: 03/24/24 12:26 PM    Specimen: Nasopharynx; Swab   Result Value Ref Range    ADENOVIRUS, PCR Not Detected Not Detected    Coronavirus 229E Not Detected Not Detected    Coronavirus HKU1 Not Detected Not Detected    Coronavirus NL63 Not Detected Not Detected    Coronavirus OC43 Not Detected Not Detected    COVID19 Not Detected Not Detected - Ref. Range    Human Metapneumovirus Not Detected Not Detected    Human Rhinovirus/Enterovirus Not Detected Not Detected    Influenza A PCR Not Detected Not Detected    Influenza B PCR Not Detected Not Detected    Parainfluenza Virus 1 Not Detected Not Detected    Parainfluenza Virus 2 Not Detected Not Detected    Parainfluenza Virus 3 Not Detected Not Detected    Parainfluenza Virus 4 Not Detected Not Detected    RSV, PCR Not Detected Not Detected    Bordetella pertussis pcr Not Detected Not Detected    Bordetella parapertussis PCR Not Detected Not Detected    Chlamydophila pneumoniae PCR Not Detected Not Detected    Mycoplasma pneumo by PCR Not Detected Not Detected       Ordered the above labs and reviewed the results.        RADIOLOGY  CT Abdomen Pelvis With Contrast    Result  Date: 3/24/2024  CT ABDOMEN PELVIS W CONTRAST-  INDICATIONS: Fever, recent ureteral stent placement  TECHNIQUE: Radiation dose reduction techniques were utilized, including automated exposure control and exposure modulation based on body size. Enhanced ABDOMEN AND PELVIS CT  COMPARISON: 6/16/2023  FINDINGS:  A left ureteral stent is in place. Mild left hydronephrosis appears similar to prior exam. A left renal cyst is again demonstrated. New since the prior exam, a posterior left subcapsular collection at the mid left kidney measures 5.6 x 1.6 x 3.4 cm, shows intermediate density (about 30 Hounsfield units), with possible etiologies including resolving subcapsular hematoma, abscess, correlate clinically. More superiorly in the left kidney, 3 discrete areas of hypodensity are seen that are not apparent on the prior exam, for example axial image 34, axial image 40, probably representing pyelonephritis, follow-up advised to exclude any possibility of new renal lesion/neoplasm. Multiple nonobstructive left nephrolithiasis is seen, as large as 1.1 cm. A 4 mm nonobstructive calcification is noted in the right kidney, as well as a right renal cyst.  A left hepatic low-density is seen that is too small to characterize.   Otherwise unremarkable appearance of the liver, spleen, adrenal glands, pancreas, kidneys, bladder.  No bowel obstruction or abnormal bowel thickening is identified. Moderate hiatal hernia is present.  No free intraperitoneal gas. Mild pelvic free fluid. Umbilical hernia of fat is present.  Scattered small mesenteric and para-aortic lymph nodes are seen that are not significant by size criteria.  Abdominal aorta is not aneurysmal. Aortic and other arterial calcifications are present.  The lung bases show a 2-3 mm subpleural nodule right middle lobe on axial image 1, only partly included, stable to extend partly included. Minimal likely scarring in the left lower lobe. A 2-3 mm lingular nodule on image 10 is  stable..  Degenerative changes are seen in the spine, bilateral L5 spondylolysis. No acute fracture is identified.          1. Evidence of multifocal left pyelonephritis, possible subcapsular abscess versus resolving hematoma. Mild left hydronephrosis with left ureteral stent in place. Bilateral nonobstructive nephrolithiasis.  2. No acute inflammatory process of bowel is identified, follow-up as indications persist.  3. Small stable appearing pulmonary nodules, partly included.  This report was finalized on 3/24/2024 2:17 PM by Dr. Keegan Vásquez M.D on Workstation: [facility]       Ordered the above noted radiological studies. Reviewed by me in PACS.            PROCEDURES  Procedures        OUTPATIENT MEDICATION MANAGEMENT:  No current Epic-ordered facility-administered medications on file.     Current Outpatient Medications Ordered in Epic   Medication Sig Dispense Refill    aspirin 81 MG EC tablet Take 1 tablet by mouth Daily. 90 tablet 3    atorvastatin (LIPITOR) 20 MG tablet Take 1 tablet by mouth Daily. 90 tablet 3    cephalexin (KEFLEX) 500 MG capsule Take 1 capsule by mouth 3 (Three) Times a Day. 21 capsule 0    Cholecalciferol (VITAMIN D3) 2000 UNITS capsule Take 1 capsule by mouth Daily.      Cyanocobalamin (B-12 PO) Take 1,000 mcg by mouth 1 (One) Time Per Week.      metoprolol tartrate (LOPRESSOR) 25 MG tablet Take 1 tablet by mouth 2 (Two) Times a Day. 60 tablet 11    Omega-3 Fatty Acids (FISH OIL) 1200 MG capsule capsule Take 1 capsule by mouth 2 (Two) Times a Day. Hold for surgery             MEDICATIONS GIVEN IN ER  Medications   iopamidol (ISOVUE-300) 61 % injection 100 mL (85 mL Intravenous Given by Other 3/24/24 1333)   cefTRIAXone (ROCEPHIN) 1,000 mg in sodium chloride 0.9 % 100 mL MBP (0 mg Intravenous Stopped 3/24/24 1532)                   MEDICAL DECISION MAKING, PROGRESS, and CONSULTS    All labs have been independently reviewed by me.  All radiology studies have been reviewed by me and  I have also reviewed the radiology report.   EKG's independently viewed and interpreted by me.  Discussion below represents my analysis of pertinent findings related to patient's condition, differential diagnosis, treatment plan and final disposition.      Additional sources:    - Discussed/ obtained information from independent historians: Spouse    - External (non-ED) record review: Patient was last admitted here in August 2023 for chest pain.  He underwent a cardiac catheterization which showed mild single-vessel CAD with 20 to 30% proximal LAD and mid LAD segment stenosis.  Medical management was recommended.  Patient had left ESWL, cystoscopy, left stent placement on 3/6/2024.    -Prescription drug monitoring program review:     N/A    - Chronic or social conditions impacting patient care (Social Determinants of Health): None          Orders placed during this visit:  Orders Placed This Encounter   Procedures    Respiratory Panel PCR w/COVID-19(SARS-CoV-2) HARVEY/NEEL/ISABEL/PAD/COR/LISSY In-House, NP Swab in UTM/VTM, 2 HR TAT - Swab, Nasopharynx    Urine Culture - Urine, Urine, Clean Catch    CT Abdomen Pelvis With Contrast    Urinalysis With Microscopic If Indicated (No Culture) - Urine, Clean Catch    Comprehensive Metabolic Panel    Procalcitonin    Lactic Acid, Plasma    CBC Auto Differential    Urinalysis, Microscopic Only - Urine, Clean Catch    Urology (on-call MD unless specified)    CBC & Differential         Additional orders considered but not ordered:          Differential diagnosis includes, but is not limited to:    Viral syndrome, bacteremia, sepsis, UTI, pneumonia      Independent interpretation of labs, radiology studies, and discussions with consultants:  ED Course as of 03/24/24 1709   Sun Mar 24, 2024   1249 Lactate: 1.5 [WH]   1249 WBC, UA(!): 6-10 [WH]   1249 Bacteria, UA(!): 1+ [WH]   1250 Leukocytes, UA(!): Trace [WH]   1250 Nitrite, UA: Negative [WH]   1250 WBC: 8.07 [WH]   1250 Hemoglobin:  15.0 [WH]   1250 Platelets(!): 124 [WH]   1250 Neutrophil Rel %(!): 77.4 [WH]   1250 Lymphocyte Rel %(!): 10.9 [WH]   1255 Glucose(!): 121 [WH]   1255 Creatinine: 1.12 [WH]   1255 Sodium(!): 135 [WH]   1306 Procalcitonin: 0.08 [WH]   1349 Respiratory panel is negative [WH]   1424 CT abdomen/pelvis personally interpreted by me.  My personal interpretation is: Left ureteral stent is in place.  Lung bases are clear.  No bowel obstruction    Per the radiologist, left ureteral stent is in place.  There is mild left hydronephrosis which appears similar to prior exam.  There is a left renal cyst.  There is a posterior left subcapsular collection at the mid left kidney measuring 5.6 x 1.6 x 3.4 cm with possible etiologies including resolving subcapsular hematoma or abscess.  There are 3 areas of hypodensity seen in the left superior kidney not apparent on prior exam.  Findings probably represent pyelonephritis. [WH]   1425 Patient is resting comfortably.  Test results discussed were discussed with him and his wife.  Case will be discussed with on-call urology. [WH]   1434 Case discussed with Dr. Andrews, urology.  Pertinent history, exam findings, test results, ED course, and diagnoses were discussed with him.  He recommends giving the patient a dose of IV Rocephin and then discharging him on Keflex.  He can follow-up with Dr. Charles this week.    Rocephin Keflex [WH]   1457 I informed the patient and his wife of my conversation with Dr. Andrews and the plan for discharge on oral antibiotics.  They are agreeable with this.  I recommended they call Dr. Manrique office in the morning.  Return precautions were discussed [WH]   1511 MDM: Patient presented to ED complaining of fever.  He had lithotripsy and left ureteral stent placement earlier this month.  He complains of mild dysuria since his procedure.  Patient was nontoxic-appearing.  He did not have a UTI.  Labs were unremarkable.  CT abdomen/pelvis showed a left subcapsular  fluid collection of the left kidney.  Case was discussed with urology.  Patient will be given a dose of IV Rocephin and discharged on oral antibiotics.  Urine culture is pending. [WH]      ED Course User Index  [WH] Minh Garcia MD         COMPLEXITY OF CARE  Admission was considered but after careful review of the patient's presentation, physical examination, diagnostic results, and response to treatment the patient may be safely discharged with outpatient follow-up.      DIAGNOSIS  Final diagnoses:   Fever, unspecified fever cause   Ureteral stent present         DISPOSITION  DISCHARGE    Patient discharged in stable condition.    Reviewed implications of results, diagnosis, meds, responsibility to follow up, warning signs and symptoms of possible worsening, potential complications and reasons to return to ER, including persistent fever, abdominal pain, flank pain, vomiting, shortness of breath, or other concern.    Patient/Family voiced understanding of above instructions.    Discussed plan for discharge, as there is no emergent indication for admission. Patient referred to primary care provider for BP management due to today's BP. Pt/family is agreeable and understands need for follow up and repeat testing.  Pt is aware that discharge does not mean that nothing is wrong but it indicates no emergency is present that requires admission and they must continue care with follow-up as given below or physician of their choice.     FOLLOW-UP  [facility]  [address] [id number] [phone]  Call in 1 day  Fever         Medication List        New Prescriptions      cephalexin 500 MG capsule  Commonly known as: KEFLEX  Take 1 capsule by mouth 3 (Three) Times a Day.               Where to Get Your Medications        These medications were sent to [id number] - [place], [place] - [address] AT [facility] - [phone]  - [phone]    [address] [address]      Phone: [phone]   cephalexin 500 MG capsule                   Latest Documented Vital Signs:  AS OF 17:09 EDT VITALS:    BP - 122/63  HR - 89  TEMP - 97.1 °F (36.2 °C)  O2 SATS - 97%            --    Please note that portions of this were completed with a voice recognition program.       Note Disclaimer: At [facility], we believe that sharing information builds trust and better relationships. You are receiving this note because you are receiving care at [facility] or recently visited. It is possible you will see health information before a provider has talked with you about it. This kind of information can be easy to misunderstand. To help you fully understand what it means for your health, we urge you to discuss this note with your provider.             Minh Garcia MD  03/24/24 6441

## 2024-03-24 NOTE — DISCHARGE INSTRUCTIONS
Take antibiotics as prescribed.  Take your first dose around noon tomorrow.  Take Tylenol as needed for fever.  Call Dr. Charles office tomorrow.  Return to the emergency department for worsening/persistent fever, chest pain, abdominal pain, flank pain, vomiting, or other concern.

## 2024-03-24 NOTE — ED NOTES
Patient to ER via car from home for fever x Friday    Patient had stent placed in kidney on March 6

## 2024-03-25 ENCOUNTER — PATIENT OUTREACH (OUTPATIENT)
Dept: CASE MANAGEMENT | Facility: OTHER | Age: 69
End: 2024-03-25
Payer: MEDICARE

## 2024-03-25 NOTE — OUTREACH NOTE
AMBULATORY CASE MANAGEMENT NOTE    Name and Relationship of Patient/Support Person: Terrance Gresham - Self    Patient Outreach  RN-ACM outreach with patient. Discussed 3/24/24 ED visit regarding fever; ureteral stent present. Patient treated and discharged. Patient states to be compliant with ED recommendations; taking Cephalexin as ED directed; states symptoms improved and has 3/2824 urology appointment.Patient states no difficulty with fever; chest pain; SOB; appetite or sleeping. Patient states to be compliant with medications and appointments. Reviewed with patient   ED AVS recommendations; education; role of RN-ACM and HRCM case management services. Patient verbalized understanding. Patient states to appreciate outreach and declines needs for further outreach at this time. No further questions voiced at this time.     Adult Patient Profile  Questions/Answers      Flowsheet Row Most Recent Value   Symptoms/Conditions Managed at Home genitourinary   Genitourinary Symptoms/Conditions other (see comments)  [Fever,  ureteral stent present]   Genitourinary Management Strategies other (see comments)  [Physician follow up]   Barriers to Taking Medication as Prescribed none   Primary Source of Support/Comfort spouse   People in Home spouse        Social Work Assessment  Questions/Answers      Flowsheet Row Most Recent Value   People in Home spouse   Functional Status Comments Patient states to be independent with ADL' s' meal preparation,  transportation and ambulation        Send Education  Questions/Answers      Flowsheet Row Most Recent Value   Annual Wellness Visit:  Patient Has Completed   Other Patient Education/Resources  24/7 [facility] Nurse Call Line, Sammy MARTELL updated and reviewed with the patient during this program:  --     Food Insecurity: No Food Insecurity (3/25/2024)    Hunger Vital Sign     Worried About Running Out of Food in the Last Year: Never true     Ran Out of Food in the  Last Year: Never true      --     Transportation Needs: No Transportation Needs (3/25/2024)    PRAPARE - Transportation     Lack of Transportation (Medical): No     Lack of Transportation (Non-Medical): No     Education Documentation  Unresolved/Worsening Symptoms, taught by Mini Harvey, SAVANAH at 3/25/2024  2:53 PM.  Learner: Patient  Readiness: Acceptance  Method: Explanation  Response: Verbalizes Understanding    Provider Follow-Up, taught by Mini Harvey RN at 3/25/2024  2:53 PM.  Learner: Patient  Readiness: Acceptance  Method: Explanation  Response: Verbalizes Understanding    Medication Management, taught by Mini Harvey RN at 3/25/2024  2:53 PM.  Learner: Patient  Readiness: Acceptance  Method: Explanation  Response: Verbalizes Understanding    Fluid Intake, taught by Mini Harvey RN at 3/25/2024  2:53 PM.  Learner: Patient  Readiness: Acceptance  Method: Explanation  Response: Verbalizes Understanding          Mini GARCIA  Ambulatory Case Management    3/25/2024, 14:54 EDT

## 2024-03-28 LAB — BACTERIA SPEC AEROBE CULT: ABNORMAL

## 2024-04-02 RX ORDER — ATORVASTATIN CALCIUM 20 MG/1
20 TABLET, FILM COATED ORAL DAILY
Qty: 90 TABLET | Refills: 1 | Status: SHIPPED | OUTPATIENT
Start: 2024-04-02

## 2024-04-24 ENCOUNTER — PATIENT OUTREACH (OUTPATIENT)
Dept: CASE MANAGEMENT | Facility: OTHER | Age: 69
End: 2024-04-24
Payer: MEDICARE

## 2024-04-24 NOTE — OUTREACH NOTE
AMBULATORY CASE MANAGEMENT NOTE    Names and Relationships of Patient/Support Persons: Caller: Terrance Gresham; Relationship: Self -     Patient Outreach  RN-ACM outreach  with patient. Patient states to have had urology appointments; follow ups and recommendations following 3/24/24 ED visit regarding fever following 3/6/24 ureteral stent placement. Patient states improvement ; states original stent removed; compliant with recommendations per urology with 4/25/24 urology appointment for removal of current stent. Patient states to be compliant with medications; medical appointment and no difficulty with voiding; fever; abdominal  pain; chest pain; SOB; appetite or.sleeping.Reviewed with [facility] medical appointments and patient verbalized understanding. Patient states to appreciate outreach and declines needs for further outreach at this time. No further questions voiced at this time.         Mini GARCIA  Ambulatory Case Management    4/24/2024, 12:52 EDT

## 2024-04-26 ENCOUNTER — TRANSCRIBE ORDERS (OUTPATIENT)
Dept: ADMINISTRATIVE | Facility: HOSPITAL | Age: 69
End: 2024-04-26
Payer: MEDICARE

## 2024-04-26 DIAGNOSIS — N10 PYELONEPHRITIS, ACUTE: Primary | ICD-10-CM

## 2024-04-26 DIAGNOSIS — N20.2 CALCULUS OF KIDNEY AND URETER: ICD-10-CM

## 2024-04-26 DIAGNOSIS — N13.30 HYDRONEPHROSIS, UNSPECIFIED HYDRONEPHROSIS TYPE: ICD-10-CM

## 2024-05-07 ENCOUNTER — HOSPITAL ENCOUNTER (OUTPATIENT)
Facility: HOSPITAL | Age: 69
Discharge: HOME | End: 2024-05-07
Admitting: UROLOGY
Payer: MEDICARE

## 2024-05-07 DIAGNOSIS — N20.2 CALCULUS OF KIDNEY AND URETER: ICD-10-CM

## 2024-05-07 DIAGNOSIS — N10 PYELONEPHRITIS, ACUTE: ICD-10-CM

## 2024-05-07 DIAGNOSIS — N13.30 HYDRONEPHROSIS, UNSPECIFIED HYDRONEPHROSIS TYPE: ICD-10-CM

## 2024-05-07 PROCEDURE — 76775 US EXAM ABDO BACK WALL LIM: CPT

## 2024-06-19 ENCOUNTER — PATIENT MESSAGE (OUTPATIENT)
Dept: FAMILY MEDICINE CLINIC | Facility: CLINIC | Age: 69
End: 2024-06-19
Payer: MEDICARE

## 2024-06-26 ENCOUNTER — LAB (OUTPATIENT)
Dept: FAMILY MEDICINE CLINIC | Facility: CLINIC | Age: 69
End: 2024-06-26
Payer: MEDICARE

## 2024-06-26 DIAGNOSIS — E55.9 VITAMIN D DEFICIENCY: ICD-10-CM

## 2024-06-26 DIAGNOSIS — E78.00 HYPERCHOLESTEROLEMIA: Primary | ICD-10-CM

## 2024-06-26 DIAGNOSIS — Z00.00 ROUTINE GENERAL MEDICAL EXAMINATION AT A HEALTH CARE FACILITY: ICD-10-CM

## 2024-06-27 LAB
25(OH)D3+25(OH)D2 SERPL-MCNC: 49.3 NG/ML (ref 30–100)
ALBUMIN SERPL-MCNC: 4.5 G/DL (ref 3.5–5.2)
ALBUMIN/GLOB SERPL: 2 G/DL
ALP SERPL-CCNC: 54 U/L (ref 39–117)
ALT SERPL-CCNC: 15 U/L (ref 1–41)
APPEARANCE UR: CLEAR
AST SERPL-CCNC: 19 U/L (ref 1–40)
BACTERIA #/AREA URNS HPF: ABNORMAL /HPF
BASOPHILS # BLD AUTO: 0.03 10*3/MM3 (ref 0–0.2)
BASOPHILS NFR BLD AUTO: 0.7 % (ref 0–1.5)
BILIRUB SERPL-MCNC: 1.7 MG/DL (ref 0–1.2)
BILIRUB UR QL STRIP: NEGATIVE
BUN SERPL-MCNC: 15 MG/DL (ref 8–23)
BUN/CREAT SERPL: 14 (ref 7–25)
CALCIUM SERPL-MCNC: 9.5 MG/DL (ref 8.6–10.5)
CASTS URNS MICRO: ABNORMAL
CHLORIDE SERPL-SCNC: 101 MMOL/L (ref 98–107)
CHOLEST SERPL-MCNC: 163 MG/DL (ref 0–200)
CO2 SERPL-SCNC: 28.4 MMOL/L (ref 22–29)
COLOR UR: YELLOW
CREAT SERPL-MCNC: 1.07 MG/DL (ref 0.76–1.27)
EGFRCR SERPLBLD CKD-EPI 2021: 74.7 ML/MIN/1.73
EOSINOPHIL # BLD AUTO: 0.04 10*3/MM3 (ref 0–0.4)
EOSINOPHIL NFR BLD AUTO: 0.9 % (ref 0.3–6.2)
EPI CELLS #/AREA URNS HPF: ABNORMAL /HPF
ERYTHROCYTE [DISTWIDTH] IN BLOOD BY AUTOMATED COUNT: 12 % (ref 12.3–15.4)
GLOBULIN SER CALC-MCNC: 2.3 GM/DL
GLUCOSE SERPL-MCNC: 86 MG/DL (ref 65–99)
GLUCOSE UR QL STRIP: NEGATIVE
HCT VFR BLD AUTO: 47.6 % (ref 37.5–51)
HDLC SERPL-MCNC: 73 MG/DL (ref 40–60)
HGB BLD-MCNC: 15.8 G/DL (ref 13–17.7)
HGB UR QL STRIP: ABNORMAL
IMM GRANULOCYTES # BLD AUTO: 0.02 10*3/MM3 (ref 0–0.05)
IMM GRANULOCYTES NFR BLD AUTO: 0.4 % (ref 0–0.5)
KETONES UR QL STRIP: ABNORMAL
LDLC SERPL CALC-MCNC: 75 MG/DL (ref 0–100)
LDLC/HDLC SERPL: 1.02 {RATIO}
LEUKOCYTE ESTERASE UR QL STRIP: NEGATIVE
LYMPHOCYTES # BLD AUTO: 1.07 10*3/MM3 (ref 0.7–3.1)
LYMPHOCYTES NFR BLD AUTO: 23.9 % (ref 19.6–45.3)
MCH RBC QN AUTO: 31.8 PG (ref 26.6–33)
MCHC RBC AUTO-ENTMCNC: 33.2 G/DL (ref 31.5–35.7)
MCV RBC AUTO: 95.8 FL (ref 79–97)
MONOCYTES # BLD AUTO: 0.4 10*3/MM3 (ref 0.1–0.9)
MONOCYTES NFR BLD AUTO: 8.9 % (ref 5–12)
NEUTROPHILS # BLD AUTO: 2.91 10*3/MM3 (ref 1.7–7)
NEUTROPHILS NFR BLD AUTO: 65.2 % (ref 42.7–76)
NITRITE UR QL STRIP: NEGATIVE
NRBC BLD AUTO-RTO: 0 /100 WBC (ref 0–0.2)
PH UR STRIP: 5.5 [PH] (ref 5–8)
PLATELET # BLD AUTO: 147 10*3/MM3 (ref 140–450)
POTASSIUM SERPL-SCNC: 4.4 MMOL/L (ref 3.5–5.2)
PROT SERPL-MCNC: 6.8 G/DL (ref 6–8.5)
PROT UR QL STRIP: NEGATIVE
RBC # BLD AUTO: 4.97 10*6/MM3 (ref 4.14–5.8)
RBC #/AREA URNS HPF: ABNORMAL /HPF
SODIUM SERPL-SCNC: 139 MMOL/L (ref 136–145)
SP GR UR STRIP: 1.02 (ref 1–1.03)
TRIGL SERPL-MCNC: 78 MG/DL (ref 0–150)
UROBILINOGEN UR STRIP-MCNC: ABNORMAL MG/DL
VLDLC SERPL CALC-MCNC: 15 MG/DL (ref 5–40)
WBC # BLD AUTO: 4.47 10*3/MM3 (ref 3.4–10.8)
WBC #/AREA URNS HPF: ABNORMAL /HPF

## 2024-07-01 ENCOUNTER — OFFICE VISIT (OUTPATIENT)
Dept: FAMILY MEDICINE CLINIC | Facility: CLINIC | Age: 69
End: 2024-07-01
Payer: MEDICARE

## 2024-07-01 VITALS
HEART RATE: 63 BPM | TEMPERATURE: 96 F | OXYGEN SATURATION: 100 % | DIASTOLIC BLOOD PRESSURE: 78 MMHG | BODY MASS INDEX: 24.54 KG/M2 | RESPIRATION RATE: 16 BRPM | HEIGHT: 71 IN | SYSTOLIC BLOOD PRESSURE: 118 MMHG | WEIGHT: 175.3 LBS

## 2024-07-01 DIAGNOSIS — E78.00 HYPERCHOLESTEROLEMIA: Primary | ICD-10-CM

## 2024-07-01 DIAGNOSIS — I25.119 ATHEROSCLEROSIS OF NATIVE CORONARY ARTERY OF NATIVE HEART WITH ANGINA PECTORIS: ICD-10-CM

## 2024-07-01 DIAGNOSIS — Z23 IMMUNIZATION DUE: ICD-10-CM

## 2024-07-01 DIAGNOSIS — F52.21 ERECTILE DYSFUNCTION OF NONORGANIC ORIGIN: ICD-10-CM

## 2024-07-01 DIAGNOSIS — Z00.00 ANNUAL PHYSICAL EXAM: ICD-10-CM

## 2024-07-01 PROCEDURE — 90677 PCV20 VACCINE IM: CPT | Performed by: INTERNAL MEDICINE

## 2024-07-01 PROCEDURE — G0009 ADMIN PNEUMOCOCCAL VACCINE: HCPCS | Performed by: INTERNAL MEDICINE

## 2024-07-01 RX ORDER — TADALAFIL 10 MG/1
10 TABLET ORAL DAILY PRN
Qty: 30 TABLET | Refills: 0 | Status: SHIPPED | OUTPATIENT
Start: 2024-07-01

## 2024-07-01 NOTE — PROGRESS NOTES
"Subjective   Terrance Gresham is a 68 y.o. male. Patient is here today for   Chief Complaint   Patient presents with    Annual Exam    Med Refill     Atorvastatin, metoprolol            Vitals:    07/01/24 0803   BP: 118/78   Pulse: 63   Resp: 16   Temp: 96 °F (35.6 °C)   SpO2: 100%     Body mass index is 24.46 kg/m².    The following portions of the patient's history were reviewed and updated as appropriate: allergies, current medications, past family history, past medical history, past social history, past surgical history and problem list.    Past Medical History:   Diagnosis Date    Chest pain 04/06/2023    came through [facility] ED..  followed up with  on 5/10/23. Coronary angiogram on 7/20 showed \"moderate to severe stenosis in the proximal LAD\" pt to start aspirin and statin.    Coronary artery disease     Disc disorder     Bulging    Enlarged prostate     History of exertional chest pain 05/2022    Hx of colonic polyp     Hyperlipidemia     Hypertension     Kidney stone 2007    Leukopenia     negative liver scan    Nephrolithiasis     Pneumonia 2022    SEPTIC    Renal calculi       Allergies   Allergen Reactions    Sulfa Antibiotics Swelling     Pt states \"my legs swelled up, fever, I just felt bad.\"      Social History     Socioeconomic History    Marital status:      Spouse name: Kimberly    Years of education: College   Tobacco Use    Smoking status: Never     Passive exposure: Never (mother smoked in the home)    Smokeless tobacco: Never   Vaping Use    Vaping status: Never Used   Substance and Sexual Activity    Alcohol use: Yes     Alcohol/week: 4.0 standard drinks of alcohol     Types: 4 Cans of beer per week     Comment: \"4 beers on weekends\" - \"rare\"    Drug use: No    Sexual activity: Yes     Partners: Female     Birth control/protection: Hysterectomy        Current Outpatient Medications:     aspirin 81 MG EC tablet, Take 1 tablet by mouth Daily., Disp: 90 tablet, Rfl: 3    atorvastatin " (LIPITOR) 20 MG tablet, TAKE 1 TABLET BY MOUTH DAILY, Disp: 90 tablet, Rfl: 1    Cholecalciferol (VITAMIN D3) 2000 UNITS capsule, Take 1 capsule by mouth Daily., Disp: , Rfl:     Cyanocobalamin (B-12 PO), Take 1,000 mcg by mouth 1 (One) Time Per Week., Disp: , Rfl:     metoprolol tartrate (LOPRESSOR) 25 MG tablet, Take 1 tablet by mouth 2 (Two) Times a Day., Disp: 60 tablet, Rfl: 11    Omega-3 Fatty Acids (FISH OIL) 1200 MG capsule capsule, Take 1 capsule by mouth 2 (Two) Times a Day. Hold for surgery, Disp: , Rfl:     tadalafil (Cialis) 10 MG tablet, Take 1 tablet by mouth Daily As Needed for Erectile Dysfunction., Disp: 30 tablet, Rfl: 0     EKG Procedures    ECG Report    Objective   History of Present Illness  This patient is here for an annual physical exam.    He feels well for the most part.  He continues to exercise regularly.    He is following up with hand surgery regarding fracture and right wrist of a small bone.    He had some kidney stones with lithotripsy and laser procedure.  He had a stent placed for a while.    He is not having any issues there.    He continues to follow-up with cardiology regarding his asymptomatic cardiac atherosclerosis.     Terrance MUSA Gresham 68 y.o. male who presents for an Annual physical exam.   Labs results discussed in detail with the patient.  Plan to update vaccines if needed today.    Health Habits:  Dental Exam. up to date  Eye Exam. up to date  Exercise: 4 times/week.  Current exercise activities include: not asked    Preventative counseling  Discussed face to face the importance of healthy diet and exercise.     Lab Results (most recent)       None              Review of Systems   Constitutional: Negative.    HENT: Negative.     Eyes: Negative.    Respiratory: Negative.     Cardiovascular: Negative.    Gastrointestinal: Negative.    Endocrine: Negative.    Genitourinary:         He notes difficulty in obtaining and maintaining an erection.   Musculoskeletal:  Negative.    Allergic/Immunologic: Negative.    Neurological: Negative.    Hematological: Negative.    Psychiatric/Behavioral: Negative.         Physical Exam  Vitals and nursing note reviewed.   Constitutional:       General: He is not in acute distress.     Appearance: Normal appearance. He is normal weight. He is not ill-appearing, toxic-appearing or diaphoretic.      Comments: Pleasant, neatly groomed, no distress.   HENT:      Head: Normocephalic.      Right Ear: Tympanic membrane, ear canal and external ear normal. There is no impacted cerumen.      Left Ear: Tympanic membrane, ear canal and external ear normal. There is no impacted cerumen.      Nose: Nose normal.      Mouth/Throat:      Mouth: Mucous membranes are moist.      Pharynx: No oropharyngeal exudate or posterior oropharyngeal erythema.   Eyes:      General: No scleral icterus.        Right eye: No discharge.         Left eye: No discharge.      Extraocular Movements: Extraocular movements intact.      Conjunctiva/sclera: Conjunctivae normal.   Neck:      Vascular: No carotid bruit.   Cardiovascular:      Rate and Rhythm: Normal rate and regular rhythm.      Heart sounds: Normal heart sounds. No murmur heard.     No gallop.   Pulmonary:      Effort: Pulmonary effort is normal. No respiratory distress.      Breath sounds: Normal breath sounds. No stridor. No wheezing, rhonchi or rales.   Chest:      Chest wall: No tenderness.   Abdominal:      General: Abdomen is flat. Bowel sounds are normal. There is no distension.      Palpations: Abdomen is soft. There is no mass.      Tenderness: There is no abdominal tenderness. There is no right CVA tenderness, left CVA tenderness, guarding or rebound.      Hernia: No hernia is present.   Genitourinary:     Penis: Normal.       Testes: Normal.   Musculoskeletal:         General: No swelling, tenderness, deformity or signs of injury.      Right lower leg: No edema.      Left lower leg: No edema.   Skin:      General: Skin is warm.      Coloration: Skin is not jaundiced or pale.      Findings: No bruising, erythema, lesion or rash.   Neurological:      General: No focal deficit present.      Mental Status: He is alert and oriented to person, place, and time.      Motor: No weakness.      Coordination: Coordination normal.      Gait: Gait normal.      Deep Tendon Reflexes: Reflexes normal.   Psychiatric:         Mood and Affect: Mood normal.         Behavior: Behavior normal.         Thought Content: Thought content normal.         Judgment: Judgment normal.         ASSESSMENT       Problems Addressed this Visit          Cardiac and Vasculature    Hypercholesterolemia - Primary    Atherosclerosis of native coronary artery of native heart with angina pectoris    Relevant Medications    tadalafil (Cialis) 10 MG tablet    metoprolol tartrate (LOPRESSOR) 25 MG tablet       Genitourinary and Reproductive     Erectile dysfunction of nonorganic origin       Health Encounters    Annual physical exam     Diagnoses         Codes Comments    Hypercholesterolemia    -  Primary ICD-10-CM: E78.00  ICD-9-CM: 272.0     Annual physical exam     ICD-10-CM: Z00.00  ICD-9-CM: V70.0     Atherosclerosis of native coronary artery of native heart with angina pectoris     ICD-10-CM: I25.119  ICD-9-CM: 414.01, 413.9     Erectile dysfunction of nonorganic origin     ICD-10-CM: F52.21  ICD-9-CM: 302.72             PLAN  His hypercholesterol could be better.  In light of his asymptomatic atherosclerotic coronary artery disease I would like his LDL cholesterol to be less than 70 or maybe even less than 55.  He is taking 20 mg of atorvastatin.  He and I did talk about increasing the dose.    He has a bit of trepidation regarding his atorvastatin and metoprolol.  He feels pretty certain that these medications are causing his erectile dysfunction.    I think that is a reasonable hypotheses especially the metoprolol which I think he should continue to  use.    In light of that he has had good experience with Cialis in the past so I sent a prescription out for that and I instructed him on the use of the [facility]ollis.    He agreed to get a pneumonia vaccine today so we gave him a Prevnar 20.    I asked him to follow-up with me in about 6 months.  Fasting labs prior to that visit should include: Lipid profile, PSA but had not been done in a year, comprehensive metabolic panel.  There are no Patient Instructions on file for this visit.    No follow-ups on file.  Patient was given instructions and counseling regarding his  condition for health maintenance advice.  Please see specific information pulled into the AVS if appropriate.

## 2024-07-03 ENCOUNTER — TELEPHONE (OUTPATIENT)
Dept: CARDIOLOGY | Facility: CLINIC | Age: 69
End: 2024-07-03
Payer: MEDICARE

## 2024-07-03 NOTE — TELEPHONE ENCOUNTER
Pt called this am. He said he has been riding his stationary bike most days. Over the last several months his heart rate has been getting higher and higher with exercise. He said it comes down at rest, but he has seen it get really high. Today it got up to 177 bpm and that is abnormal for him during exercise. Pt states that he has other health issues, including kidney stones, that may be contributing, but he would like to be seen in clinic. Scheduled to see Rajwinder next week. Pt verbalized understanding.    Thank you,    Jacki Womack RN  Triage [facility]  07/03/24 09:41 EDT

## 2024-07-06 ENCOUNTER — HOSPITAL ENCOUNTER (EMERGENCY)
Facility: HOSPITAL | Age: 69
Discharge: HOME | End: 2024-07-06
Attending: EMERGENCY MEDICINE
Payer: MEDICARE

## 2024-07-06 ENCOUNTER — APPOINTMENT (OUTPATIENT)
Dept: GENERAL RADIOLOGY | Facility: HOSPITAL | Age: 69
End: 2024-07-06
Payer: MEDICARE

## 2024-07-06 ENCOUNTER — APPOINTMENT (OUTPATIENT)
Dept: CARDIOLOGY | Facility: HOSPITAL | Age: 69
End: 2024-07-06
Payer: MEDICARE

## 2024-07-06 VITALS
HEIGHT: 71 IN | HEART RATE: 72 BPM | SYSTOLIC BLOOD PRESSURE: 105 MMHG | RESPIRATION RATE: 18 BRPM | DIASTOLIC BLOOD PRESSURE: 67 MMHG | BODY MASS INDEX: 24.5 KG/M2 | OXYGEN SATURATION: 95 % | TEMPERATURE: 96.6 F | WEIGHT: 175 LBS

## 2024-07-06 DIAGNOSIS — R00.2 PALPITATIONS: ICD-10-CM

## 2024-07-06 DIAGNOSIS — G62.9 PERIPHERAL POLYNEUROPATHY: ICD-10-CM

## 2024-07-06 DIAGNOSIS — R07.89 ATYPICAL CHEST PAIN: Primary | ICD-10-CM

## 2024-07-06 LAB
ALBUMIN SERPL-MCNC: 4.7 G/DL (ref 3.5–5.2)
ALBUMIN/GLOB SERPL: 1.8 G/DL
ALP SERPL-CCNC: 57 U/L (ref 39–117)
ALT SERPL W P-5'-P-CCNC: 14 U/L (ref 1–41)
ANION GAP SERPL CALCULATED.3IONS-SCNC: 12.9 MMOL/L (ref 5–15)
AST SERPL-CCNC: 18 U/L (ref 1–40)
BASOPHILS # BLD AUTO: 0.03 10*3/MM3 (ref 0–0.2)
BASOPHILS NFR BLD AUTO: 0.8 % (ref 0–1.5)
BILIRUB SERPL-MCNC: 1.7 MG/DL (ref 0–1.2)
BUN SERPL-MCNC: 13 MG/DL (ref 8–23)
BUN/CREAT SERPL: 12.1 (ref 7–25)
CALCIUM SPEC-SCNC: 9.7 MG/DL (ref 8.6–10.5)
CHLORIDE SERPL-SCNC: 94 MMOL/L (ref 98–107)
CO2 SERPL-SCNC: 26.1 MMOL/L (ref 22–29)
CREAT SERPL-MCNC: 1.07 MG/DL (ref 0.76–1.27)
DEPRECATED RDW RBC AUTO: 44.7 FL (ref 37–54)
EGFRCR SERPLBLD CKD-EPI 2021: 74.7 ML/MIN/1.73
EOSINOPHIL # BLD AUTO: 0.03 10*3/MM3 (ref 0–0.4)
EOSINOPHIL NFR BLD AUTO: 0.8 % (ref 0.3–6.2)
ERYTHROCYTE [DISTWIDTH] IN BLOOD BY AUTOMATED COUNT: 12.7 % (ref 12.3–15.4)
GEN 5 2HR TROPONIN T REFLEX: 11 NG/L
GLOBULIN UR ELPH-MCNC: 2.6 GM/DL
GLUCOSE SERPL-MCNC: 100 MG/DL (ref 65–99)
HCT VFR BLD AUTO: 52.5 % (ref 37.5–51)
HGB BLD-MCNC: 17.6 G/DL (ref 13–17.7)
HOLD SPECIMEN: NORMAL
HOLD SPECIMEN: NORMAL
IMM GRANULOCYTES # BLD AUTO: 0.01 10*3/MM3 (ref 0–0.05)
IMM GRANULOCYTES NFR BLD AUTO: 0.3 % (ref 0–0.5)
LYMPHOCYTES # BLD AUTO: 0.88 10*3/MM3 (ref 0.7–3.1)
LYMPHOCYTES NFR BLD AUTO: 24.1 % (ref 19.6–45.3)
MAGNESIUM SERPL-MCNC: 2 MG/DL (ref 1.6–2.4)
MCH RBC QN AUTO: 32.2 PG (ref 26.6–33)
MCHC RBC AUTO-ENTMCNC: 33.5 G/DL (ref 31.5–35.7)
MCV RBC AUTO: 96.2 FL (ref 79–97)
MONOCYTES # BLD AUTO: 0.33 10*3/MM3 (ref 0.1–0.9)
MONOCYTES NFR BLD AUTO: 9 % (ref 5–12)
NEUTROPHILS NFR BLD AUTO: 2.37 10*3/MM3 (ref 1.7–7)
NEUTROPHILS NFR BLD AUTO: 65 % (ref 42.7–76)
NRBC BLD AUTO-RTO: 0 /100 WBC (ref 0–0.2)
PLATELET # BLD AUTO: 152 10*3/MM3 (ref 140–450)
PMV BLD AUTO: 9.2 FL (ref 6–12)
POTASSIUM SERPL-SCNC: 4.6 MMOL/L (ref 3.5–5.2)
PROT SERPL-MCNC: 7.3 G/DL (ref 6–8.5)
RBC # BLD AUTO: 5.46 10*6/MM3 (ref 4.14–5.8)
SODIUM SERPL-SCNC: 133 MMOL/L (ref 136–145)
TROPONIN T DELTA: -1 NG/L
TROPONIN T SERPL HS-MCNC: 12 NG/L
WBC NRBC COR # BLD AUTO: 3.65 10*3/MM3 (ref 3.4–10.8)
WHOLE BLOOD HOLD COAG: NORMAL
WHOLE BLOOD HOLD SPECIMEN: NORMAL

## 2024-07-06 PROCEDURE — 86618 LYME DISEASE ANTIBODY: CPT | Performed by: PHYSICIAN ASSISTANT

## 2024-07-06 PROCEDURE — 85025 COMPLETE CBC W/AUTO DIFF WBC: CPT | Performed by: EMERGENCY MEDICINE

## 2024-07-06 PROCEDURE — 99284 EMERGENCY DEPT VISIT MOD MDM: CPT

## 2024-07-06 PROCEDURE — 87468 ANAPLSMA PHGCYTOPHLM AMP PRB: CPT | Performed by: PHYSICIAN ASSISTANT

## 2024-07-06 PROCEDURE — 84484 ASSAY OF TROPONIN QUANT: CPT | Performed by: EMERGENCY MEDICINE

## 2024-07-06 PROCEDURE — 87484 EHRLICHA CHAFFEENSIS AMP PRB: CPT | Performed by: PHYSICIAN ASSISTANT

## 2024-07-06 PROCEDURE — 93005 ELECTROCARDIOGRAM TRACING: CPT

## 2024-07-06 PROCEDURE — 25810000003 SODIUM CHLORIDE 0.9 % SOLUTION: Performed by: PHYSICIAN ASSISTANT

## 2024-07-06 PROCEDURE — 93005 ELECTROCARDIOGRAM TRACING: CPT | Performed by: EMERGENCY MEDICINE

## 2024-07-06 PROCEDURE — 83735 ASSAY OF MAGNESIUM: CPT | Performed by: PHYSICIAN ASSISTANT

## 2024-07-06 PROCEDURE — 71045 X-RAY EXAM CHEST 1 VIEW: CPT

## 2024-07-06 PROCEDURE — 87798 DETECT AGENT NOS DNA AMP: CPT | Performed by: PHYSICIAN ASSISTANT

## 2024-07-06 PROCEDURE — 80053 COMPREHEN METABOLIC PANEL: CPT | Performed by: EMERGENCY MEDICINE

## 2024-07-06 PROCEDURE — 36415 COLL VENOUS BLD VENIPUNCTURE: CPT

## 2024-07-06 PROCEDURE — 93246 EXT ECG>7D<15D RECORDING: CPT

## 2024-07-06 RX ORDER — ASPIRIN 325 MG
325 TABLET ORAL ONCE
Status: DISCONTINUED | OUTPATIENT
Start: 2024-07-06 | End: 2024-07-06

## 2024-07-06 RX ORDER — SODIUM CHLORIDE 0.9 % (FLUSH) 0.9 %
10 SYRINGE (ML) INJECTION AS NEEDED
Status: DISCONTINUED | OUTPATIENT
Start: 2024-07-06 | End: 2024-07-06 | Stop reason: HOSPADM

## 2024-07-06 RX ADMIN — SODIUM CHLORIDE 1000 ML: 9 INJECTION, SOLUTION INTRAVENOUS at 09:34

## 2024-07-06 NOTE — ED PROVIDER NOTES
EMERGENCY DEPARTMENT ENCOUNTER    Room Number:  08/08  Date of encounter:  7/6/2024  PCP: Moose Chase MD  Historian: Patient, spouse  Chronic or social conditions impacting care (social determinants of health): None    HPI:  Chief Complaint: Chest pain, bilateral foot pain  A complete HPI/ROS/PMH/PSH/SH/FH are unobtainable due to: Nothing    Context: Terrance Gresham is a 68 y.o. male with a history of hypercholesterolemia, hypertension, nonocclusive coronary artery disease.  He presents to the ED c/o acute intermittent chest pain for the past 4 days.  Patient states his symptoms started Wednesday while riding his stationary bike.  He felt some mild transient chest pain and noticed that his heart rate was in the 170s.  His heart rate has never been this high before.  His heart rate returned to normal after stopping.  He has not ridden his bike since.  He states he will occasionally have some intermittent sporadic chest pain.  He describes the pain as migratory over his entire chest and achy.  He denies any associated shortness of breath or nausea.  He follows with Dr. Amato in cardiology and has an appointment in 3 days.  Patient had seen his primary care doctor on 7/1/2024.  Patient had a Prevnar and Tdap shot.  He states he is felt generally weak since this time.    He also complains of bilateral foot pain, paresthesias.  This has been going on for several months.  He states his feet feel cold at times.  He has never been evaluated for neuropathy in the past.    He also states he had a bug bite to his right medial thigh.  He is concerned about possible tickborne illness.    Review of prior external notes (non-ED):   I reviewed hypertension, hyperlipidemia office visit from 2/9/2024.  Patient being followed for cardiology.    Review of prior external test results outside of this encounter:  Review a heart catheterization from 8/1/2023.  This showed mild diffuse disease.    PAST MEDICAL HISTORY  Active  Ambulatory Problems     Diagnosis Date Noted    Hypogonadism in male 05/09/2016    Hypercholesterolemia 05/09/2016    Gastroesophageal reflux disease 05/09/2016    Paresthesia of lower extremity 05/09/2016    Thoracic back pain 05/09/2016    Erectile dysfunction of nonorganic origin 05/09/2016    Well adult exam 11/17/2016    Leukopenia 08/29/2018    History of colon polyps 09/07/2018    Medicare annual wellness visit, subsequent 01/19/2022    Community acquired pneumonia 04/29/2022    Pneumonia due to Streptococcus pneumoniae 04/30/2022    Influenza A 04/30/2022    Elevated d-dimer 05/02/2022    Bacteremia due to Streptococcus pneumoniae 05/02/2022    Annual physical exam 06/10/2022    Renal calculi 07/05/2023    Atherosclerosis of native coronary artery of native heart with angina pectoris 01/12/2024     Resolved Ambulatory Problems     Diagnosis Date Noted    Adiposity 05/09/2016    Essential hypertension 05/09/2016    Chest pain, atypical 02/24/2017    Depression with anxiety 03/24/2020    Dyspnea 04/30/2022     Past Medical History:   Diagnosis Date    Chest pain 04/06/2023    Coronary artery disease     Disc disorder     Enlarged prostate     History of exertional chest pain 05/2022    Hx of colonic polyp     Hyperlipidemia     Hypertension     Kidney stone 2007    Nephrolithiasis     Pneumonia 2022         PAST SURGICAL HISTORY  Past Surgical History:   Procedure Laterality Date    CARDIAC CATHETERIZATION N/A 8/1/2023    Procedure: Left Heart Cath;  Surgeon: Ariel Landeros MD;  Location:  [facility];  Service: Cardiovascular;  Laterality: N/A;    CARDIAC CATHETERIZATION N/A 8/1/2023    Procedure: Coronary angiography;  Surgeon: Ariel Landeros MD;  Location:  [facility];  Service: Cardiovascular;  Laterality: N/A;    CATARACT EXTRACTION W/ INTRAOCULAR LENS IMPLANT Bilateral 2022    COLONOSCOPY N/A 12/10/2018    Procedure: COLONOSCOPY to cecuman ti;  Surgeon: Valentina  "Jose VIGIL MD;  Location: [facility];  Service: Gastroenterology    COLONOSCOPY W/ POLYPECTOMY      CYST REMOVAL Right 2012    wrist     CYSTOSCOPY      CYSTOSCOPY W/ LASER LITHOTRIPSY      EXTRACORPOREAL SHOCK WAVE LITHOTRIPSY (ESWL) Left 7/5/2023    Procedure: LEFT  SHOCKWAVE LITHOTRIPSY;  Surgeon: Abel Pinto MD;  Location: [facility] OR;  Service: Urology;  Laterality: Left;    KNEE ARTHROSCOPY Right 2015    KNEE SURGERY Right 01/13/2016         FAMILY HISTORY  Family History   Problem Relation Age of Onset    Stomach cancer Mother     Stroke Mother     Breast cancer Mother     Lung cancer Mother     Prostate cancer Father     Alzheimer's disease Father     Kidney disease Father     Heart disease Father         Triple bypass    Hypertension Father     No Known Problems Sister     Prostate cancer Brother 60    Obesity Brother     Sleep apnea Brother     Other Maternal Grandmother         Circulatory /heart problems    Heart attack Maternal Grandfather     Cancer Paternal Grandfather     Malig Hyperthermia Neg Hx          SOCIAL HISTORY  Social History     Socioeconomic History    Marital status:      Spouse name: Kimberly    Years of education: College   Tobacco Use    Smoking status: Never     Passive exposure: Never (mother smoked in the home)    Smokeless tobacco: Never   Vaping Use    Vaping status: Never Used   Substance and Sexual Activity    Alcohol use: Yes     Alcohol/week: 4.0 standard drinks of alcohol     Types: 4 Cans of beer per week     Comment: \"4 beers on weekends\" - \"rare\"    Drug use: No    Sexual activity: Yes     Partners: Female     Birth control/protection: Hysterectomy         ALLERGIES  Sulfa antibiotics        REVIEW OF SYSTEMS  All systems reviewed and negative except for those discussed in HPI.       PHYSICAL EXAM    I have reviewed the triage vital signs and nursing notes.    ED Triage Vitals   Temp Heart Rate Resp BP SpO2   07/06/24 0852 07/06/24 0852 07/06/24 0852 " 07/06/24 0859 07/06/24 0852   96.6 °F (35.9 °C) 81 16 131/73 93 %      Temp src Heart Rate Source Patient Position BP Location FiO2 (%)   07/06/24 0852 07/06/24 0852 -- -- --   Tympanic Monitor          Physical Exam  GENERAL: Alert, oriented, not distressed  HENT: head atraumatic, no nuchal rigidity  EYES: no scleral icterus, EOMI  CV: regular rhythm, regular rate, no murmur  RESPIRATORY: normal effort, CTA  ABDOMEN: soft, nontender  MUSCULOSKELETAL: 2+ pedal pulses intact bilaterally.  Skin intact.  NEURO: alert, moves all extremities, follows commands  SKIN: warm, dry        LAB RESULTS  Recent Results (from the past 24 hour(s))   ECG 12 Lead ED Triage Standing Order; Chest Pain    Collection Time: 07/06/24  8:50 AM   Result Value Ref Range    QT Interval 384 ms    QTC Interval 409 ms   Comprehensive Metabolic Panel    Collection Time: 07/06/24  9:26 AM    Specimen: Arm, Right; Blood   Result Value Ref Range    Glucose 100 (H) 65 - 99 mg/dL    BUN 13 8 - 23 mg/dL    Creatinine 1.07 0.76 - 1.27 mg/dL    Sodium 133 (L) 136 - 145 mmol/L    Potassium 4.6 3.5 - 5.2 mmol/L    Chloride 94 (L) 98 - 107 mmol/L    CO2 26.1 22.0 - 29.0 mmol/L    Calcium 9.7 8.6 - 10.5 mg/dL    Total Protein 7.3 6.0 - 8.5 g/dL    Albumin 4.7 3.5 - 5.2 g/dL    ALT (SGPT) 14 1 - 41 U/L    AST (SGOT) 18 1 - 40 U/L    Alkaline Phosphatase 57 39 - 117 U/L    Total Bilirubin 1.7 (H) 0.0 - 1.2 mg/dL    Globulin 2.6 gm/dL    A/G Ratio 1.8 g/dL    BUN/Creatinine Ratio 12.1 7.0 - 25.0    Anion Gap 12.9 5.0 - 15.0 mmol/L    eGFR 74.7 >60.0 mL/min/1.73   High Sensitivity Troponin T    Collection Time: 07/06/24  9:26 AM    Specimen: Arm, Right; Blood   Result Value Ref Range    HS Troponin T 12 <22 ng/L   Green Top (Gel)    Collection Time: 07/06/24  9:26 AM   Result Value Ref Range    Extra Tube Hold for add-ons.    Lavender Top    Collection Time: 07/06/24  9:26 AM   Result Value Ref Range    Extra Tube hold for add-on    Gold Top - SST    Collection  Time: 07/06/24  9:26 AM   Result Value Ref Range    Extra Tube Hold for add-ons.    Light Blue Top    Collection Time: 07/06/24  9:26 AM   Result Value Ref Range    Extra Tube Hold for add-ons.    CBC Auto Differential    Collection Time: 07/06/24  9:26 AM    Specimen: Arm, Right; Blood   Result Value Ref Range    WBC 3.65 3.40 - 10.80 10*3/mm3    RBC 5.46 4.14 - 5.80 10*6/mm3    Hemoglobin 17.6 13.0 - 17.7 g/dL    Hematocrit 52.5 (H) 37.5 - 51.0 %    MCV 96.2 79.0 - 97.0 fL    MCH 32.2 26.6 - 33.0 pg    MCHC 33.5 31.5 - 35.7 g/dL    RDW 12.7 12.3 - 15.4 %    RDW-SD 44.7 37.0 - 54.0 fl    MPV 9.2 6.0 - 12.0 fL    Platelets 152 140 - 450 10*3/mm3    Neutrophil % 65.0 42.7 - 76.0 %    Lymphocyte % 24.1 19.6 - 45.3 %    Monocyte % 9.0 5.0 - 12.0 %    Eosinophil % 0.8 0.3 - 6.2 %    Basophil % 0.8 0.0 - 1.5 %    Immature Grans % 0.3 0.0 - 0.5 %    Neutrophils, Absolute 2.37 1.70 - 7.00 10*3/mm3    Lymphocytes, Absolute 0.88 0.70 - 3.10 10*3/mm3    Monocytes, Absolute 0.33 0.10 - 0.90 10*3/mm3    Eosinophils, Absolute 0.03 0.00 - 0.40 10*3/mm3    Basophils, Absolute 0.03 0.00 - 0.20 10*3/mm3    Immature Grans, Absolute 0.01 0.00 - 0.05 10*3/mm3    nRBC 0.0 0.0 - 0.2 /100 WBC   Magnesium    Collection Time: 07/06/24  9:26 AM    Specimen: Arm, Right; Blood   Result Value Ref Range    Magnesium 2.0 1.6 - 2.4 mg/dL   High Sensitivity Troponin T 2Hr    Collection Time: 07/06/24 11:59 AM    Specimen: Blood   Result Value Ref Range    HS Troponin T 11 <22 ng/L    Troponin T Delta -1 >=-4 - <+4 ng/L       Ordered the above labs and independently reviewed the results.        RADIOLOGY  XR Chest 1 View    Result Date: 7/6/2024  XR CHEST 1 VW-   INDICATION: Chest pain  COMPARISON: Chest radiograph August 1, 2023  TECHNIQUE: 1 view chest  FINDINGS:  Linear right suprahilar opacity. No new opacities. No effusions. Stable mediastinum. Heart is normal in size.      Stable chest with linear right suprahilar opacity. Suspect subsegmental  atelectasis or scarring.  This report was finalized on 7/6/2024 9:21 AM by Dr. Onel Millan M.D on Workstation: [id number]       I ordered the above noted radiological studies. Reviewed by me and discussed with radiologist.  See dictation for official radiology interpretation.      MEDICATIONS GIVEN IN ER    Medications   sodium chloride 0.9 % flush 10 mL (has no administration in time range)   sodium chloride 0.9 % bolus 1,000 mL (0 mL Intravenous Stopped 7/6/24 1038)         ADDITIONAL ORDERS CONSIDERED BUT NOT ORDERED:  Considered admission however patient is overall fairly low risk for heart disease.  He has had a recent negative heart cath.  I believe he is safe for following up this week.      PROGRESS, DATA ANALYSIS, CONSULTS, AND MEDICAL DECISION MAKING    All labs have been independently interpreted by myself.  All radiology studies have been independently interpreted by myself and discussed with radiologist dictating the report.   EKG's independently interpreted by myself.  Discussion below represents my analysis of pertinent findings related to patient's condition, differential diagnosis, treatment plan and final disposition.    I have discussed case with Dr. Pinto, emergency room physician.  He has performed his own bedside examination and agrees with treatment plan.    ED Course as of 07/06/24 1452   Sat Jul 06, 2024   0920 Patient presents with intermittent chest pain, malaise for the past 5 days.  Patient also complains of possible tick bite, bilateral foot paresthesias.  He does have a history of nonocclusive coronary artery disease. [EE]   0947 WBC: 3.65 [EE]   0947 Hemoglobin: 17.6 [EE]   0947 Platelets: 152 [EE]   1042 HS Troponin T: 12 [EE]   1319 Recheck of patient.  He is pain-free here.  Stable vitals.  He has had a recent nonocclusive heart cath.  Shared decision making with the patient.  He does have an appointment Tuesday to see his cardiologist.  I plan to place him on a Holter  monitor given his recent episode of tachycardia.  Patient also appears to have peripheral neuropathy.  Will have him follow-up with his primary care doctor for this.  No evidence of DVT or arterial occlusions.  They are in agreement with treatment plan. [EE]      ED Course User Index  [EE] Miguel Ángel Hutchison PA       AS OF 14:52 EDT VITALS:    BP - 105/67  HR - 72  TEMP - 96.6 °F (35.9 °C) (Tympanic)  O2 SATS - 95%        DIAGNOSIS  Final diagnoses:   Atypical chest pain   Palpitations   Peripheral polyneuropathy         DISPOSITION  Discharged    Admission was considered but after careful review of the patient's presentation, physical examination, diagnostic results, and response to treatment the patient may be safely discharged with outpatient follow-up.         Dictated utilizing Dragon dictation     Miguel Ángel Hutchison PA  07/06/24 0102

## 2024-07-06 NOTE — ED PROVIDER NOTES
SHARED VISIT: This visit was performed by BOTH a physician and an APC. The substantive portion of the medical decision making was performed by this attesting physician who made or approved the management plan and takes responsibility for patient management. All studies in the APC note (if performed) were independently interpreted by me.     The SE and I have discussed this patient's history, physical exam, and treatment plan.  I have reviewed the documentation and personally had a face to face interaction with the patient. I affirm the documentation and agree with the treatment and plan.  The attached note describes my personal findings.      I provided a substantive portion of the care of the patient.  I personally performed the physical exam in its entirety, and below are my findings.     Brief history of present illness: 68-year-old male complaining of intermittent chest discomfort at rest over the last 4 to 5 days.  Occasional dyspnea.  Notes his heart rate goes really high when he is on his exercise bike.  Has discomfort with that as well.    Physical exam:    /73   Pulse 64   Temp 96.6 °F (35.9 °C) (Tympanic)   Resp 16   SpO2 97%       Physical Exam   Constitutional: No distress.  Nontoxic  HENT:  Head: Normocephalic and atraumatic.   Oropharynx: Mucous membranes are moist.   Eyes: . No scleral icterus.   Neck: Normal range of motion. Neck supple.   Cardiovascular: Pink warm and well perfused throughout.    Pulmonary/Chest: No respiratory distress.    Abdominal: Soft. There is no rebound or rigidity  Musculoskeletal: Moves all extremities equally.    Neurological: Alert and oriented.  Speech fluent and easily intelligible  Skin: Skin is pink, warm, and dry.   Psychiatric: Mood and affect normal.   Nursing note and vitals reviewed.        MDM:    My differential diagnosis for chest pain includes but is not limited to:  Muscle strain, costochondritis, myositis, pleurisy, rib fracture, intercostal  neuritis, herpes zoster, tumor, myocardial infarction, coronary syndrome, unstable angina, angina, aortic dissection, mitral valve prolapse, pericarditis, palpitations, pulmonary embolus, pneumonia, pneumothorax, lung cancer, GERD, esophagitis, esophageal spasm    I have personally reviewed and interpreted the EKG obtained today in the emergency department at 0850 concomitant with treatment.  EKG reveals rhythm/rate -NS, 70. NV-JACKY within normal limits.  QRS-narrow complex ST/T-wave -no STEMI      Please note that portions of this were completed with a voice recognition program.       Note Disclaimer: At [facility], we believe that sharing information builds trust and better relationships. You are receiving this note because you are receiving care at [facility] or recently visited. It is possible you will see health information before a provider has talked with you about it. This kind of information can be easy to misunderstand. To help you fully understand what it means for your health, we urge you to discuss this note with your provider.     Juan Pinto MD  07/06/24 8024

## 2024-07-06 NOTE — ED NOTES
Pt has had int cp since Tuesday.  He rides a stationary bike and this week his heart increased to 170.  He had a pmd appt tues and got a prevnar and tdap and has felt bad since.  He reports bilat foot tingling and numbness

## 2024-07-08 LAB
QT INTERVAL: 384 MS
QTC INTERVAL: 409 MS

## 2024-07-09 ENCOUNTER — OFFICE VISIT (OUTPATIENT)
Dept: CARDIOLOGY | Facility: CLINIC | Age: 69
End: 2024-07-09
Payer: MEDICARE

## 2024-07-09 VITALS
BODY MASS INDEX: 23.66 KG/M2 | HEIGHT: 71 IN | SYSTOLIC BLOOD PRESSURE: 122 MMHG | HEART RATE: 64 BPM | DIASTOLIC BLOOD PRESSURE: 70 MMHG | WEIGHT: 169 LBS

## 2024-07-09 DIAGNOSIS — R00.0 TACHYCARDIA: ICD-10-CM

## 2024-07-09 DIAGNOSIS — R07.89 CHEST PAIN, ATYPICAL: ICD-10-CM

## 2024-07-09 DIAGNOSIS — I25.10 CORONARY ARTERY DISEASE INVOLVING NATIVE CORONARY ARTERY OF NATIVE HEART WITHOUT ANGINA PECTORIS: Primary | ICD-10-CM

## 2024-07-09 LAB — B BURGDOR IGG+IGM SER QL IA: NEGATIVE

## 2024-07-09 PROCEDURE — 1159F MED LIST DOCD IN RCRD: CPT | Performed by: NURSE PRACTITIONER

## 2024-07-09 PROCEDURE — 1160F RVW MEDS BY RX/DR IN RCRD: CPT | Performed by: NURSE PRACTITIONER

## 2024-07-09 PROCEDURE — 93000 ELECTROCARDIOGRAM COMPLETE: CPT | Performed by: NURSE PRACTITIONER

## 2024-07-09 PROCEDURE — 99214 OFFICE O/P EST MOD 30 MIN: CPT | Performed by: NURSE PRACTITIONER

## 2024-07-09 NOTE — PROGRESS NOTES
Date of Office Visit: 24  Encounter Provider: JUANCHO Davis  Place of Service: [facility] CARDIOLOGY  Patient Name: Terrance Gresham  :1955    Chief Complaint   Patient presents with    Follow-up   :     HPI: Terrance Gresham is a 68 y.o. male  with hypertension, hyperlipidemia, GERD, nonobstructive coronary artery disease, kidney stones, anxiety and tachycardia.     He has family history of early chronic coronary artery disease with his father having bypass in his late 50s.  He was previously followed by Dr. Keegan Reid.  He is now followed by Dr. Joao Amato.  I will visit with him in follow-up and have reviewed his medical record.     In 2017 he had echocardiogram showing normal left ventricular systolic function, mild concentric hypertrophy, mildly dilated right ventricular cavity, mild to moderately dilated left atrial cavity and no significant valve disease.  Perfusion stress test was normal.  He had a normal bilateral carotid artery duplex at that time as well  He reported exertional chest discomfort while riding his bike.  He had a coronary CTA 2023 which showed normal coronary anatomy with evidence of atherosclerotic disease, moderate multivessel coronary artery disease with possibly severe distal RCA lesion.  Calcium score was 912.  RV was mildly enlarged with slight wall motion abnormality as well.  He had invasive coronary angiogram 2023 which showed mild single-vessel coronary artery disease with a 20-30% proximal LAD lesion as well as a mid LAD segment stenosis with otherwise luminal irregularities.  He ultimately was treated with aspirin, atorvastatin and started on metoprolol tartrate.    He had COVID in 2024.    He had an ER visit 2024.  He reported some exertional chest pain and heart rate as high as 170 while riding stationary bike.  High-sensitivity troponin was normal x 2.  Magnesium was normal.  Potassium was  "normal.  Chloride was slightly low as well as sodium was slightly low.  He reportedly had received Tdap and Prevnar immunization 7/1/2024 and felt weak since then.  Chest x-ray showed no acute process.  He was discharged with 1week external mobile telemetry.    He presents today for reassessment.  He reportedly is still wearing Zio patch.  He has rode his bike while wearing the monitor.  When his heart rate gets fast he has some chest discomfort which does not radiate.  He denies shortness of breath or edema.  He reports intermittent dizziness and lightheadedness while exercising.  No palpitations but he sees on his watch that his heart rate gets fast.  Normally his heart rate is 120-130.  He occasionally takes metoprolol after exercise and cannot remember if he had metoprolol the morning his heart rate got up to 170 or not.  He has been on multiple rounds of antibiotic for kidney stone procedures.  He continues to have numbness and tingling in his legs and ankle mainly on the left.  He plans to discuss further with his PCP tomorrow.  Allergies   Allergen Reactions    Sulfa Antibiotics Swelling     Pt states \"my legs swelled up, fever, I just felt bad.\"           Family and social history reviewed.     ROS  All other systems were reviewed and are negative          Objective:     Vitals:    07/09/24 0938   BP: 122/70   BP Location: Left arm   Patient Position: Sitting   Pulse: 64   Weight: 76.7 kg (169 lb)   Height: 180.3 cm (71\")     Body mass index is 23.57 kg/m².    PHYSICAL EXAM:  Pulmonary:      Breath sounds: Normal breath sounds.   Cardiovascular:      Normal rate. Regular rhythm.           ECG 12 Lead    Date/Time: 7/9/2024 9:45 AM  Performed by: Rajwinder Del Toro APRN    Authorized by: Rajwinder Del Toro APRN  Comparison: compared with previous ECG   Similar to previous ECG  Rhythm: sinus rhythm  Rate: normal  QRS axis: normal  Comments: No change            Current Outpatient Medications   Medication Sig Dispense " Refill    aspirin 81 MG EC tablet Take 1 tablet by mouth Daily. 90 tablet 3    atorvastatin (LIPITOR) 20 MG tablet TAKE 1 TABLET BY MOUTH DAILY 90 tablet 1    Cholecalciferol (VITAMIN D3) 2000 UNITS capsule Take 1 capsule by mouth Daily.      Cyanocobalamin (B-12 PO) Take 1,000 mcg by mouth 1 (One) Time Per Week.      metoprolol tartrate (LOPRESSOR) 25 MG tablet Take 1 tablet by mouth 2 (Two) Times a Day. 60 tablet 11    Omega-3 Fatty Acids (FISH OIL) 1200 MG capsule capsule Take 1 capsule by mouth 2 (Two) Times a Day. Hold for surgery      tadalafil (Cialis) 10 MG tablet Take 1 tablet by mouth Daily As Needed for Erectile Dysfunction. 30 tablet 0     No current facility-administered medications for this visit.     Assessment:       Diagnosis Plan   1. Coronary artery disease involving native coronary artery of native heart without angina pectoris  ECG 12 Lead      2. Tachycardia  ECG 12 Lead      3. Chest pain, atypical  Adult Stress Echo W/ Cont or Stress Agent if Necessary Per Protocol           Orders Placed This Encounter   Procedures    ECG 12 Lead     This order was created via procedure documentation     Order Specific Question:   Release to patient     Answer:   Routine Release [[id number]]    Adult Stress Echo W/ Cont or Stress Agent if Necessary Per Protocol     Standing Status:   Future     Standing Expiration Date:   7/9/2025     Order Specific Question:   What stress agent will be used?     Answer:   Exercise with Possible Pharmalog[employer]     Order Specific Question:   Reason for exam?     Answer:   Chest Pain     Order Specific Question:   Release to patient     Answer:   Routine Release [[id number]]         Plan:       1.  68-year-old gentleman with nonobstructive coronary artery disease on cardiac catheterization August 2023.  He had a 20-30% proximal LAD and mid LAD segment stenosis with otherwise luminal irregularities.  He is continue aspirin, metoprolol tartrate 25 mg twice daily and atorvastatin 20  mg.  -Now with some exertional complaint-he will return for stress echo so we can monitor his heart rate and blood pressure response.  2.  Mixed hyperlipidemia on atorvastatin 20 mg and omega-3 fatty acid.  Target LDL is less than 70.  Reviewed his lipid panel from December 2023 showing LDL of 76.  Will continue current regimen  3. Normal Bilateral carotid duplex March 2017  4.  Bilateral numbness and tingling in lower extremities with more symptoms on the left where he had prior injury.  He has palpable distal pulse.  He declines KINSEY testing at this time.  He plans to discuss with his PCP.  5.History of kidney stones-[facility].  Reportedly has been on 3 rounds of antibiotic  6.  Recent tachycardia-patient unsure if he had his usual dose of metoprolol before exercise.  Stress echo as above.  He is wearing 1 week Zio patch for the next several days  7.  Bite ena-on thigh, Lyme disease, Rickettsia and Ehrlichia labs are still pending                It has been a pleasure to participate in this patient's care.      Thank you,  JUANCHO Davis      **I used Dragon to dictate this note:**

## 2024-07-10 ENCOUNTER — OFFICE VISIT (OUTPATIENT)
Dept: FAMILY MEDICINE CLINIC | Facility: CLINIC | Age: 69
End: 2024-07-10
Payer: MEDICARE

## 2024-07-10 VITALS
HEIGHT: 71 IN | OXYGEN SATURATION: 95 % | DIASTOLIC BLOOD PRESSURE: 74 MMHG | HEART RATE: 57 BPM | BODY MASS INDEX: 23.63 KG/M2 | TEMPERATURE: 98.6 F | RESPIRATION RATE: 18 BRPM | SYSTOLIC BLOOD PRESSURE: 122 MMHG | WEIGHT: 168.8 LBS

## 2024-07-10 DIAGNOSIS — R07.9 INTERMITTENT CHEST PAIN: Primary | ICD-10-CM

## 2024-07-10 DIAGNOSIS — I83.90 VARICOSE VEINS OF ANKLE: ICD-10-CM

## 2024-07-10 DIAGNOSIS — I78.1 SPIDER VEINS: ICD-10-CM

## 2024-07-10 DIAGNOSIS — G62.9 NEUROPATHY: ICD-10-CM

## 2024-07-10 DIAGNOSIS — Z13.6 SCREENING FOR CARDIOVASCULAR CONDITION: ICD-10-CM

## 2024-07-10 PROCEDURE — 99214 OFFICE O/P EST MOD 30 MIN: CPT | Performed by: NURSE PRACTITIONER

## 2024-07-10 PROCEDURE — 1111F DSCHRG MED/CURRENT MED MERGE: CPT | Performed by: NURSE PRACTITIONER

## 2024-07-10 PROCEDURE — 1159F MED LIST DOCD IN RCRD: CPT | Performed by: NURSE PRACTITIONER

## 2024-07-10 PROCEDURE — 1160F RVW MEDS BY RX/DR IN RCRD: CPT | Performed by: NURSE PRACTITIONER

## 2024-07-10 PROCEDURE — 1126F AMNT PAIN NOTED NONE PRSNT: CPT | Performed by: NURSE PRACTITIONER

## 2024-07-10 NOTE — PROGRESS NOTES
"Subjective   Terrance Gresham is a 68 y.o. male. Patient is here today for   Chief Complaint   Patient presents with    Hospital Follow Up Visit              Vitals:    07/10/24 0943   BP: 122/74   Pulse: 57   Resp: 18   Temp: 98.6 °F (37 °C)   SpO2: 95%     The following portions of the patient's history were reviewed and updated as appropriate: allergies, current medications, past family history, past medical history, past social history, past surgical history and problem list.    Past Medical History:   Diagnosis Date    Cataract September, October 2022    Cataract sugery    Chest pain 04/06/2023    came through [facility] ED..  followed up with  on 5/10/23. Coronary angiogram on 7/20 showed \"moderate to severe stenosis in the proximal LAD\" pt to start aspirin and statin.    Coronary artery disease     Disc disorder     Bulging    Enlarged prostate     History of exertional chest pain 05/2022    Hx of colonic polyp     Hyperlipidemia     Hypertension     Kidney stone 2007    Leukopenia     negative liver scan    Nephrolithiasis     Pneumonia 2022    [facility]PT[facility]    Renal calculi       Allergies   Allergen Reactions    Sulfa Antibiotics Swelling     Pt states \"my legs swelled up, fever, I just felt bad.\"      Social History     Socioeconomic History    Marital status:      Spouse name: Kimberly    Years of education: College   Tobacco Use    Smoking status: Never     Passive exposure: Never (mother smoked in the home)    Smokeless tobacco: Never    Tobacco comments:     No caffeine   Vaping Use    Vaping status: Never Used   Substance and Sexual Activity    Alcohol use: Yes     Alcohol/week: 4.0 standard drinks of alcohol     Types: 2 Glasses of wine, 2 Cans of beer per week     Comment: rarely    Drug use: No    Sexual activity: Not Currently     Partners: Female     Birth control/protection: Hysterectomy        Current Outpatient Medications:     aspirin 81 MG EC tablet, Take 1 tablet by mouth Daily., Disp: " 90 tablet, Rfl: 3    atorvastatin (LIPITOR) 20 MG tablet, TAKE 1 TABLET BY MOUTH DAILY, Disp: 90 tablet, Rfl: 1    Cholecalciferol (VITAMIN D3) 2000 UNITS capsule, Take 1 capsule by mouth Daily., Disp: , Rfl:     Cyanocobalamin (B-12 PO), Take 1,000 mcg by mouth 1 (One) Time Per Week., Disp: , Rfl:     metoprolol tartrate (LOPRESSOR) 25 MG tablet, Take 1 tablet by mouth 2 (Two) Times a Day., Disp: 60 tablet, Rfl: 11    Omega-3 Fatty Acids (FISH OIL) 1200 MG capsule capsule, Take 1 capsule by mouth 2 (Two) Times a Day. Hold for surgery, Disp: , Rfl:     tadalafil (Cialis) 10 MG tablet, Take 1 tablet by mouth Daily As Needed for Erectile Dysfunction., Disp: 30 tablet, Rfl: 0     Objective     History of Present Illness  Patient here today for hospital follow up from [facility] on 7/6 for c/o chest pain for 4 days.  Patient states his symptoms started Wednesday while riding his stationary bike.  He felt some mild chest pain and noticed that his heart rate was in the 170s.  Patient stating his heart rate had never been that high. Patient stating his heart rate returned to normal after stopping his stationary bike. Patient stating he still has occasional chest pain. Patient denied shortness of breath or nausea.  He sees Dr. Amato cardiologist. Patient stating on 7/1/2024 he got the Prevnar and Tdap vaccines and has felt weak since. Patient c/o bilateral foot pain, feet feeling cold and having paresthesia for several months.  Patient stating he has never been told he has neuropathy. Patient c/o having a bug bite to right medial thigh.  Patient has a Stress test scheduled for next Tuesday and Holter monitor scheduled for placement on Saturday.    Patient stating he is eating healthier, is eating more chicken and drinking water throughout the day.             Review of Systems   Constitutional: Negative.    Respiratory: Negative.  Negative for shortness of breath.    Cardiovascular: Negative.  Negative for chest pain.    Genitourinary: Negative.    Musculoskeletal: Negative.    Neurological:         Neuropathy to william. Legs        Physical Exam  Vitals reviewed.   HENT:      Head: Normocephalic.   Eyes:      Pupils: Pupils are equal, round, and reactive to light.   Cardiovascular:      Rate and Rhythm: Normal rate and regular rhythm.      Pulses: Normal pulses.   Pulmonary:      Effort: Pulmonary effort is normal.      Breath sounds: Normal breath sounds.   Musculoskeletal:         General: Normal range of motion.   Skin:     General: Skin is warm and dry.      Comments: Varicose veins and spider veins to feet/ankle   Neurological:      Mental Status: He is alert and oriented to person, place, and time.   Psychiatric:         Behavior: Behavior normal.         ASSESSMENT     Problems Addressed this Visit          Neuro    Neuropathy       Skin    Spider veins       Other    Varicose veins of ankle     Other Visit Diagnoses       Intermittent chest pain    -  Primary    Screening for cardiovascular condition        Relevant Orders    Vascular Screening (Bundle) CAR          Diagnoses         Codes Comments    Intermittent chest pain    -  Primary ICD-10-CM: R07.9  ICD-9-CM: 786.50     Neuropathy     ICD-10-CM: G62.9  ICD-9-CM: 355.9     Varicose veins of ankle     ICD-10-CM: I83.90  ICD-9-CM: 454.9 and feet    Spider veins     ICD-10-CM: I78.1  ICD-9-CM: 448.1 feet and ankle    Screening for cardiovascular condition     ICD-10-CM: Z13.6  ICD-9-CM: V81.2             Current outpatient and discharge medications have been reconciled for the patient.  Reviewed by: JUANCHO Boyer      PLAN    Patient Instructions   Ordering vascular screenings; will consider EMG after completion of vascular screening if needed  Continue with stress test and holter monitor and follow up with cardiologist as recommended/as directed    Return if symptoms worsen or fail to improve, for Dr. Chase as needed/as recommended.

## 2024-07-10 NOTE — PATIENT INSTRUCTIONS
Ordering vascular screenings; will consider EMG after completion of vascular screening if needed  Continue with stress test and holter monitor and follow up with cardiologist as recommended/as directed

## 2024-07-11 LAB
A PHAGOCYTOPH DNA BLD QL NAA+PROBE: NEGATIVE
E CHAFFEENSIS DNA BLD QL NAA+PROBE: NEGATIVE

## 2024-07-12 LAB — RICKETTSIA RICKETTSII DNA, RT: NOT DETECTED

## 2024-07-15 ENCOUNTER — TELEPHONE (OUTPATIENT)
Dept: CARDIOLOGY | Facility: CLINIC | Age: 69
End: 2024-07-15
Payer: MEDICARE

## 2024-07-15 PROBLEM — I83.90 VARICOSE VEINS OF ANKLE: Status: ACTIVE | Noted: 2024-07-15

## 2024-07-15 PROBLEM — G62.9 NEUROPATHY: Status: ACTIVE | Noted: 2024-07-15

## 2024-07-15 PROBLEM — I78.1 SPIDER VEINS: Status: ACTIVE | Noted: 2024-07-15

## 2024-07-16 ENCOUNTER — TELEPHONE (OUTPATIENT)
Dept: CARDIOLOGY | Facility: CLINIC | Age: 69
End: 2024-07-16
Payer: MEDICARE

## 2024-07-16 ENCOUNTER — HOSPITAL ENCOUNTER (OUTPATIENT)
Dept: CARDIOLOGY | Facility: HOSPITAL | Age: 69
Discharge: HOME | End: 2024-07-16
Admitting: NURSE PRACTITIONER
Payer: MEDICARE

## 2024-07-16 VITALS
WEIGHT: 168 LBS | DIASTOLIC BLOOD PRESSURE: 84 MMHG | BODY MASS INDEX: 24.05 KG/M2 | SYSTOLIC BLOOD PRESSURE: 136 MMHG | OXYGEN SATURATION: 98 % | HEART RATE: 82 BPM | HEIGHT: 70 IN

## 2024-07-16 DIAGNOSIS — R07.89 CHEST PAIN, ATYPICAL: ICD-10-CM

## 2024-07-16 LAB
AORTIC ARCH: 3.2 CM
ASCENDING AORTA: 3.4 CM
BH CV ECHO MEAS - ACS: 2.4 CM
BH CV ECHO MEAS - AO MAX PG: 8 MMHG
BH CV ECHO MEAS - AO MEAN PG: 5 MMHG
BH CV ECHO MEAS - AO ROOT DIAM: 3.8 CM
BH CV ECHO MEAS - AO V2 MAX: 141 CM/SEC
BH CV ECHO MEAS - AO V2 VTI: 28.8 CM
BH CV ECHO MEAS - AVA(I,D): 2.6 CM2
BH CV ECHO MEAS - EDV(CUBED): 79.5 ML
BH CV ECHO MEAS - EDV(MOD-SP4): 86 ML
BH CV ECHO MEAS - EF(MOD-BP): 60 %
BH CV ECHO MEAS - EF(MOD-SP4): 59.3 %
BH CV ECHO MEAS - ESV(CUBED): 13.1 ML
BH CV ECHO MEAS - ESV(MOD-SP4): 35 ML
BH CV ECHO MEAS - FS: 45.1 %
BH CV ECHO MEAS - IVS/LVPW: 1 CM
BH CV ECHO MEAS - IVSD: 0.8 CM
BH CV ECHO MEAS - LAT PEAK E' VEL: 9.6 CM/SEC
BH CV ECHO MEAS - LV DIASTOLIC VOL/BSA (35-75): 44.4 CM2
BH CV ECHO MEAS - LV MASS(C)D: 105.3 GRAMS
BH CV ECHO MEAS - LV MAX PG: 6.6 MMHG
BH CV ECHO MEAS - LV MEAN PG: 3 MMHG
BH CV ECHO MEAS - LV SYSTOLIC VOL/BSA (12-30): 18.1 CM2
BH CV ECHO MEAS - LV V1 MAX: 128 CM/SEC
BH CV ECHO MEAS - LV V1 VTI: 23.5 CM
BH CV ECHO MEAS - LVIDD: 4.3 CM
BH CV ECHO MEAS - LVIDS: 2.36 CM
BH CV ECHO MEAS - LVOT AREA: 3.2 CM2
BH CV ECHO MEAS - LVOT DIAM: 2.01 CM
BH CV ECHO MEAS - LVPWD: 0.8 CM
BH CV ECHO MEAS - MED PEAK E' VEL: 7.1 CM/SEC
BH CV ECHO MEAS - MR MAX PG: 8.8 MMHG
BH CV ECHO MEAS - MR MAX VEL: 148 CM/SEC
BH CV ECHO MEAS - MV A DUR: 0.12 SEC
BH CV ECHO MEAS - MV A MAX VEL: 70.3 CM/SEC
BH CV ECHO MEAS - MV DEC SLOPE: 337.1 CM/SEC2
BH CV ECHO MEAS - MV DEC TIME: 0.2 SEC
BH CV ECHO MEAS - MV E MAX VEL: 60 CM/SEC
BH CV ECHO MEAS - MV E/A: 0.85
BH CV ECHO MEAS - MV MAX PG: 3 MMHG
BH CV ECHO MEAS - MV MEAN PG: 1.83 MMHG
BH CV ECHO MEAS - MV P1/2T: 74.3 MSEC
BH CV ECHO MEAS - MV V2 VTI: 26.3 CM
BH CV ECHO MEAS - MVA(P1/2T): 3 CM2
BH CV ECHO MEAS - MVA(VTI): 2.8 CM2
BH CV ECHO MEAS - PA ACC TIME: 0.09 SEC
BH CV ECHO MEAS - PA V2 MAX: 119.4 CM/SEC
BH CV ECHO MEAS - PULM A REVS DUR: 0.15 SEC
BH CV ECHO MEAS - PULM A REVS VEL: 114 CM/SEC
BH CV ECHO MEAS - PULM DIAS VEL: 45 CM/SEC
BH CV ECHO MEAS - PULM S/D: 1.39
BH CV ECHO MEAS - PULM SYS VEL: 62.6 CM/SEC
BH CV ECHO MEAS - QP/QS: 0.44
BH CV ECHO MEAS - RAP SYSTOLE: 8 MMHG
BH CV ECHO MEAS - RV MAX PG: 0.99 MMHG
BH CV ECHO MEAS - RV V1 MAX: 49.7 CM/SEC
BH CV ECHO MEAS - RV V1 VTI: 10 CM
BH CV ECHO MEAS - RVOT DIAM: 2.04 CM
BH CV ECHO MEAS - RVSP: 27.3 MMHG
BH CV ECHO MEAS - SUP REN AO DIAM: 1.8 CM
BH CV ECHO MEAS - SV(LVOT): 74.8 ML
BH CV ECHO MEAS - SV(MOD-SP4): 51 ML
BH CV ECHO MEAS - SV(RVOT): 32.8 ML
BH CV ECHO MEAS - SVI(LVOT): 38.6 ML/M2
BH CV ECHO MEAS - SVI(MOD-SP4): 26.3 ML/M2
BH CV ECHO MEAS - TAPSE (>1.6): 2.04 CM
BH CV ECHO MEAS - TR MAX PG: 19.3 MMHG
BH CV ECHO MEAS - TR MAX VEL: 219.9 CM/SEC
BH CV ECHO MEASUREMENTS AVERAGE E/E' RATIO: 7.19
BH CV STRESS BP STAGE 1: NORMAL
BH CV STRESS BP STAGE 2: NORMAL
BH CV STRESS DURATION MIN STAGE 1: 3
BH CV STRESS DURATION MIN STAGE 2: 3
BH CV STRESS DURATION MIN STAGE 3: 1
BH CV STRESS DURATION SEC STAGE 1: 0
BH CV STRESS DURATION SEC STAGE 2: 0
BH CV STRESS DURATION SEC STAGE 3: 0
BH CV STRESS ECHO POST STRESS EJECTION FRACTION EF: 76 %
BH CV STRESS GRADE STAGE 1: 10
BH CV STRESS GRADE STAGE 2: 12
BH CV STRESS GRADE STAGE 3: 14
BH CV STRESS HR STAGE 1: 110
BH CV STRESS HR STAGE 2: 141
BH CV STRESS HR STAGE 3: 162
BH CV STRESS METS STAGE 1: 5
BH CV STRESS METS STAGE 2: 7.5
BH CV STRESS METS STAGE 3: 8
BH CV STRESS PROTOCOL 1: NORMAL
BH CV STRESS SPEED STAGE 1: 1.7
BH CV STRESS SPEED STAGE 2: 2.5
BH CV STRESS SPEED STAGE 3: 3.4
BH CV STRESS STAGE 1: 1
BH CV STRESS STAGE 2: 2
BH CV STRESS STAGE 3: 3
BH CV XLRA - RV BASE: 3.2 CM
BH CV XLRA - RV LENGTH: 7.2 CM
BH CV XLRA - RV MID: 3.2 CM
BH CV XLRA - TDI S': 13.6 CM/SEC
LEFT ATRIUM VOLUME INDEX: 18.6 ML/M2
MAXIMAL PREDICTED HEART RATE: 150 BPM
PERCENT MAX PREDICTED HR: 108 %
SINUS: 3.5 CM
STJ: 3.2 CM
STRESS BASELINE BP: NORMAL MMHG
STRESS BASELINE HR: 81 BPM
STRESS PERCENT HR: 127 %
STRESS POST ESTIMATED WORKLOAD: 8 METS
STRESS POST EXERCISE DUR MIN: 7 MIN
STRESS POST EXERCISE DUR SEC: 0 SEC
STRESS POST PEAK BP: NORMAL MMHG
STRESS POST PEAK HR: 162 BPM
STRESS TARGET HR: 128 BPM

## 2024-07-16 PROCEDURE — 25510000001 PERFLUTREN (DEFINITY) 8.476 MG IN SODIUM CHLORIDE (PF) 0.9 % 10 ML INJECTION: Performed by: NURSE PRACTITIONER

## 2024-07-16 PROCEDURE — 93320 DOPPLER ECHO COMPLETE: CPT

## 2024-07-16 PROCEDURE — 93017 CV STRESS TEST TRACING ONLY: CPT

## 2024-07-16 PROCEDURE — 93350 STRESS TTE ONLY: CPT

## 2024-07-16 PROCEDURE — 93325 DOPPLER ECHO COLOR FLOW MAPG: CPT

## 2024-07-16 RX ADMIN — PERFLUTREN 3 ML: 6.52 INJECTION, SUSPENSION INTRAVENOUS at 14:22

## 2024-07-16 NOTE — TELEPHONE ENCOUNTER
I tried to call Terrance Gresham but there was no answer.  Left a voicemail asking patient to call back.  Will continue to try to reach pt.    HUB- if pt calls back, please transfer through to triage.    Thank you,    Bridget FELIX RN  Triage [facility]  07/16/24 15:03 EDT

## 2024-07-16 NOTE — TELEPHONE ENCOUNTER
Terrance Gresham returned call.    Reviewed results and recommendations with patient and he verbalized understanding of results and recommendations.    Thank you,  Nanda VALDEZ RN  Triage Nurse [facility]   15:29 EDT

## 2024-07-16 NOTE — TELEPHONE ENCOUNTER
Please inform patient stress echo has been reviewed and his blood pressure and heart rate had a normal response with stress.  There is no evidence of tightly blocked coronary artery and his heart stayed strong.  No valve disease.  Okay to keep September appointment as scheduled

## 2024-07-22 ENCOUNTER — HOSPITAL ENCOUNTER (OUTPATIENT)
Dept: CARDIOLOGY | Facility: HOSPITAL | Age: 69
Discharge: HOME | End: 2024-07-22
Admitting: NURSE PRACTITIONER

## 2024-07-22 DIAGNOSIS — Z13.6 SCREENING FOR CARDIOVASCULAR CONDITION: ICD-10-CM

## 2024-07-22 LAB
BH CV VAS SCREENING CAROTID CCA LEFT: 99 CM/SEC
BH CV VAS SCREENING CAROTID CCA RIGHT: 86 CM/SEC
BH CV VAS SCREENING CAROTID ICA LEFT: 81 CM/SEC
BH CV VAS SCREENING CAROTID ICA RIGHT: 84 CM/SEC
BH CV XLRA MEAS - MID AO DIAM: 2.3 CM
BH CV XLRA MEAS - PAD LEFT ABI PT: 1.18
BH CV XLRA MEAS - PAD LEFT ARM: 120 MMHG
BH CV XLRA MEAS - PAD LEFT LEG PT: 151 MMHG
BH CV XLRA MEAS - PAD RIGHT ABI PT: 1.19
BH CV XLRA MEAS - PAD RIGHT ARM: 128 MMHG
BH CV XLRA MEAS - PAD RIGHT LEG PT: 152 MMHG
BH CV XLRA MEAS LEFT DIST CCA EDV: -25.5 CM/SEC
BH CV XLRA MEAS LEFT DIST CCA PSV: -98.8 CM/SEC
BH CV XLRA MEAS LEFT ICA/CCA RATIO: 0.8
BH CV XLRA MEAS LEFT PROX ICA EDV: -34.8 CM/SEC
BH CV XLRA MEAS LEFT PROX ICA PSV: -80.8 CM/SEC
BH CV XLRA MEAS RIGHT DIST CCA EDV: 19.9 CM/SEC
BH CV XLRA MEAS RIGHT DIST CCA PSV: 86.4 CM/SEC
BH CV XLRA MEAS RIGHT ICA/CCA RATIO: 1
BH CV XLRA MEAS RIGHT PROX ICA EDV: -29.2 CM/SEC
BH CV XLRA MEAS RIGHT PROX ICA PSV: -83.9 CM/SEC

## 2024-07-22 PROCEDURE — 93799 UNLISTED CV SVC/PROCEDURE: CPT

## 2024-09-09 ENCOUNTER — OFFICE VISIT (OUTPATIENT)
Dept: CARDIOLOGY | Facility: CLINIC | Age: 69
End: 2024-09-09
Payer: MEDICARE

## 2024-09-09 VITALS
WEIGHT: 169.4 LBS | HEART RATE: 59 BPM | DIASTOLIC BLOOD PRESSURE: 64 MMHG | SYSTOLIC BLOOD PRESSURE: 100 MMHG | BODY MASS INDEX: 24.25 KG/M2 | HEIGHT: 70 IN

## 2024-09-09 DIAGNOSIS — I25.10 CORONARY ARTERY DISEASE INVOLVING NATIVE CORONARY ARTERY OF NATIVE HEART WITHOUT ANGINA PECTORIS: Primary | ICD-10-CM

## 2024-09-09 DIAGNOSIS — Z82.49 FAMILY HISTORY OF CORONARY ARTERY DISEASE: ICD-10-CM

## 2024-09-09 PROCEDURE — 93000 ELECTROCARDIOGRAM COMPLETE: CPT | Performed by: INTERNAL MEDICINE

## 2024-09-09 PROCEDURE — 99214 OFFICE O/P EST MOD 30 MIN: CPT | Performed by: INTERNAL MEDICINE

## 2024-09-09 PROCEDURE — 1160F RVW MEDS BY RX/DR IN RCRD: CPT | Performed by: INTERNAL MEDICINE

## 2024-09-09 PROCEDURE — 1159F MED LIST DOCD IN RCRD: CPT | Performed by: INTERNAL MEDICINE

## 2024-09-09 NOTE — PROGRESS NOTES
[facility]      Patient Name: Terrance Gresham  :1955  Age: 68 y.o.  Encounter Provider:  Joao Amato Jr, MD      Chief Complaint: Initial evaluation chest pain      Chest Pain   Pertinent negatives include no abdominal pain, cough, dizziness, fever, near-syncope, orthopnea, palpitations, PND or syncope.     Terrance Gresham is a 68 y.o. male previous history of hypertension and dyslipidemia who was on medications until he lost a significant amount of weight not currently on medications and doing well until he had some chest pain about a month ago presents for follow-up evaluation.     Last clinic visit note: He presented for atypical chest pain in 2017 and saw Dr. Keegan Reid.  A nuclear stress study was done at that point in time that showed no evidence of myocardial ischemia.  About a month ago he had a severe episode of left-sided chest discomfort and was seen in the ER with a benign work-up.  He presents today for evaluation.  He exercises daily with fleeting episodes of chest discomfort that do not limit physical activity.  No orthopnea, PND or edema.  No palpitations, dizziness or syncope.  He wears an Apple watch daily and denies any high heart rate alerts.  He has some occasional dizziness when he is riding the bicycle but no episodes of syncope.  As stated above he was previously hypertensive with significant dyslipidemia but this all resolved after losing 70 pounds.  He does have a family history of coronary artery disease with his father having CABG at age 60.  He is a lifelong non-smoker drinks socially and denies illicit drug use.    CT coronary angiogram showed calcium score of 400 with questionable obstructive disease in the proximal to mid LAD.  He had diffuse atherosclerosis but otherwise mild nonobstructive disease.  We decided to start medical therapy but a few days later he came into the ER with more chest pain and concern for symptoms.  He was taken to  "the cardiac catheterization lab and found to have mild nonobstructive disease.  He is very anxious about his cardiac diagnosis despite getting a good report on cardiac catheterization.  He is slowly getting back into activity.      Given the recurrent persistent complaints cardiac catheterization performed showing mild nonobstructive disease.  He was back in the ER earlier this year and saw JUANCHO Del Toro who ordered a stress echo which showed no evidence of ischemia.  Heart rate and blood pressure well-controlled.  No further chest pain or shortness of air.  LDL 75.  No cardiac complaints at time of interview.    The following portions of the patient's history were reviewed and updated as appropriate: allergies, current medications, past family history, past medical history, past social history, past surgical history and problem list.      Review of Systems   Constitutional: Negative for chills and fever.   HENT:  Negative for hoarse voice and sore throat.    Eyes:  Negative for double vision and photophobia.   Cardiovascular:  Positive for chest pain. Negative for leg swelling, near-syncope, orthopnea, palpitations, paroxysmal nocturnal dyspnea and syncope.   Respiratory:  Negative for cough and wheezing.    Skin:  Negative for poor wound healing and rash.   Musculoskeletal:  Negative for arthritis and joint swelling.   Gastrointestinal:  Negative for bloating, abdominal pain, hematemesis and hematochezia.   Neurological:  Negative for dizziness and focal weakness.   Psychiatric/Behavioral:  Negative for depression and suicidal ideas.        OBJECTIVE:   Vital Signs  Vitals:    09/09/24 1101   BP: 100/64   Pulse: 59     Estimated body mass index is 24.31 kg/m² as calculated from the following:    Height as of this encounter: 177.8 cm (70\").    Weight as of this encounter: 76.8 kg (169 lb 6.4 oz).    Vitals reviewed.   Constitutional:       Appearance: Healthy appearance. Not in distress.   Neck:      Vascular: " No JVR. JVD normal.   Pulmonary:      Effort: Pulmonary effort is normal.      Breath sounds: Normal breath sounds. No wheezing. No rhonchi. No rales.   Chest:      Chest wall: Not tender to palpatation.   Cardiovascular:      PMI at left midclavicular line. Normal rate. Regular rhythm. Normal S1. Normal S2.       Murmurs: There is no murmur.      No gallop.  No click. No rub.   Pulses:     Intact distal pulses.   Edema:     Peripheral edema absent.   Abdominal:      General: Bowel sounds are normal.      Palpations: Abdomen is soft.      Tenderness: There is no abdominal tenderness.   Musculoskeletal: Normal range of motion.         General: No tenderness. Skin:     General: Skin is warm and dry.   Neurological:      General: No focal deficit present.      Mental Status: Alert and oriented to person, place and time.         ECG 12 Lead    Date/Time: 9/9/2024 11:12 AM  Performed by: Joao Amato Jr., MD    Authorized by: Joao Amato Jr., MD  Comparison: compared with previous ECG from 7/9/2024  Similar to previous ECG  Rhythm: sinus rhythm  Other findings: poor R wave progression    Clinical impression: non-specific ECG        Lipid Panel          12/11/2023    08:37 6/26/2024    10:30   Lipid Panel   Total Cholesterol 158  163    Triglycerides 61  78    HDL Cholesterol 70  73    VLDL Cholesterol 12  15    LDL Cholesterol  76  75    LDL/HDL Ratio 1.1  1.02         BUN   Date Value Ref Range Status   07/06/2024 13 8 - 23 mg/dL Final     Creatinine   Date Value Ref Range Status   07/06/2024 1.07 0.76 - 1.27 mg/dL Final   07/20/2023 0.90 0.60 - 1.30 mg/dL Final     Comment:     Serial Number: [id number]:  [id number]     Potassium   Date Value Ref Range Status   07/06/2024 4.6 3.5 - 5.2 mmol/L Final     ALT (SGPT)   Date Value Ref Range Status   07/06/2024 14 1 - 41 U/L Final     AST (SGOT)   Date Value Ref Range Status   07/06/2024 18 1 - 40 U/L Final           ASSESSMENT:     68-year-old male with family history  of CAD who presents for evaluation of chest pain    PLAN OF CARE:     Nonobstructive coronary artery disease -continue aspirin and statin.  Aspirin may be held for lithotripsy.  Low risk of periprocedural MACE.  Elevated blood pressure without diagnosis of hypertension -controlled today in clinic.  Patient had history of hypertension on medication until losing weight.  Continue sodium restricted diet.  Family history coronary artery disease   Mixed hyperlipidemia -LDL is not quite at goal.  Patient would prefer to continue working on diet and exercise before any change in medications.  We will monitor closely.    Return to clinic 12 months             Discharge Medications            Accurate as of September 9, 2024 11:12 AM. If you have any questions, ask your nurse or doctor.                Continue These Medications        Instructions Start Date   aspirin 81 MG EC tablet   81 mg, Oral, Daily      atorvastatin 20 MG tablet  Commonly known as: LIPITOR   20 mg, Oral, Daily      B-12 PO   1,000 mcg, Oral, Weekly      fish oil 1200 MG capsule capsule   1,200 mg, Oral, 2 Times Daily, Hold for surgery      metoprolol tartrate 25 MG tablet  Commonly known as: LOPRESSOR   25 mg, Oral, 2 Times Daily      tadalafil 10 MG tablet  Commonly known as: Cialis   10 mg, Oral, Daily PRN      Vitamin D3 50 MCG (2000 UT) capsule   2,000 Units, Oral, Daily               Thank you for allowing me to participate in the care of your patient,      Sincerely,   Joao Amato MD  [facility]  09/09/24  11:12 EDT

## 2024-10-09 ENCOUNTER — OFFICE VISIT (OUTPATIENT)
Dept: FAMILY MEDICINE CLINIC | Facility: CLINIC | Age: 69
End: 2024-10-09
Payer: MEDICARE

## 2024-10-09 VITALS
SYSTOLIC BLOOD PRESSURE: 118 MMHG | OXYGEN SATURATION: 93 % | HEIGHT: 70 IN | BODY MASS INDEX: 25 KG/M2 | TEMPERATURE: 98.7 F | DIASTOLIC BLOOD PRESSURE: 70 MMHG | WEIGHT: 174.6 LBS | HEART RATE: 83 BPM | RESPIRATION RATE: 16 BRPM

## 2024-10-09 DIAGNOSIS — R73.9 HYPERGLYCEMIA: ICD-10-CM

## 2024-10-09 DIAGNOSIS — G62.9 NEUROPATHY: ICD-10-CM

## 2024-10-09 DIAGNOSIS — G62.9 PERIPHERAL POLYNEUROPATHY: Primary | ICD-10-CM

## 2024-10-09 DIAGNOSIS — G57.93 UNSPECIFIED MONONEUROPATHY OF BILATERAL LOWER LIMBS: ICD-10-CM

## 2024-10-09 PROCEDURE — 1126F AMNT PAIN NOTED NONE PRSNT: CPT | Performed by: INTERNAL MEDICINE

## 2024-10-09 PROCEDURE — 99213 OFFICE O/P EST LOW 20 MIN: CPT | Performed by: INTERNAL MEDICINE

## 2024-10-09 NOTE — PROGRESS NOTES
Subjective   Terrance Gresham is a 68 y.o. male. Patient is here today for   Chief Complaint   Patient presents with    Hyperlipidemia        History of Present Illness  History of Present Illness  The patient presents for evaluation of his feet.    He continues to experience discomfort in his feet, despite maintaining an active lifestyle with regular stationary bike exercises. He reports numbness and burning sensations in both legs for approximately 1 to 2 years, with one toe causing significant discomfort for several years. He recalls a diagnosis of a pinched nerve in his ankle from 20 to 30 years ago. He also notes the presence of spider veins and varicose veins. He mentions that his legs feel hotter and sometimes burn when wearing compression socks. Despite these issues, he sleeps well and the discomfort does not seem to bother him when he is distracted.    He was previously advised to wear compression socks and undergo a vascular scan, which revealed mild stenosis in the right internal carotid artery. A stress test was also conducted and showed normal results. He reports intermittent use of the compression socks and describes his legs as feeling warm. He reports no swelling in his feet and states that the compression socks do not seem to alleviate his symptoms.    He is currently taking vitamin B12 twice a week, down from a previous daily dosage. His last lab work was done in 07/2024, and he is scheduled for another round in 01/2025, with a physical examination planned for 06/2025.    He is back on a statin drug and metoprolol. He was on these medications for a long time, then discontinued them after losing weight. However, he resumed them because his cholesterol levels were increasing.    SOCIAL HISTORY  He drinks few beers a week.      Vitals:    10/09/24 0929   BP: 118/70   Pulse: 83   Resp: 16   Temp: 98.7 °F (37.1 °C)   SpO2: 93%     Body mass index is 25.05 kg/m².    Past Medical History:   Diagnosis  "Date    Cataract September, October 2022    Cataract sugery    Chest pain 04/06/2023    came through [facility] ED..  followed up with  on 5/10/23. Coronary angiogram on 7/20 showed \"moderate to severe stenosis in the proximal LAD\" pt to start aspirin and statin.    Coronary artery disease     Disc disorder     Bulging    Enlarged prostate     History of exertional chest pain 05/2022    Hx of colonic polyp     Hyperlipidemia     Hypertension     Kidney stone 2007    Leukopenia     negative liver scan    Nephrolithiasis     Pneumonia 2022    SEPTIC    Renal calculi       Allergies   Allergen Reactions    Sulfa Antibiotics Swelling     Pt states \"my legs swelled up, fever, I just felt bad.\"      Social History     Socioeconomic History    Marital status:      Spouse name: Kimberly    Years of education: College   Tobacco Use    Smoking status: Never     Passive exposure: Never (mother smoked in the home)    Smokeless tobacco: Never    Tobacco comments:     No caffeine   Vaping Use    Vaping status: Never Used   Substance and Sexual Activity    Alcohol use: Yes     Alcohol/week: 4.0 standard drinks of alcohol     Types: 2 Glasses of wine, 2 Cans of beer per week     Comment: rarely    Drug use: No    Sexual activity: Not Currently     Partners: Female     Birth control/protection: Hysterectomy        Current Outpatient Medications:     aspirin 81 MG EC tablet, Take 1 tablet by mouth Daily., Disp: 90 tablet, Rfl: 3    atorvastatin (LIPITOR) 20 MG tablet, TAKE 1 TABLET BY MOUTH DAILY, Disp: 90 tablet, Rfl: 1    Cholecalciferol (VITAMIN D3) 2000 UNITS capsule, Take 1 capsule by mouth Daily., Disp: , Rfl:     Cyanocobalamin (B-12 PO), Take 1,000 mcg by mouth 1 (One) Time Per Week., Disp: , Rfl:     metoprolol tartrate (LOPRESSOR) 25 MG tablet, Take 1 tablet by mouth 2 (Two) Times a Day., Disp: 60 tablet, Rfl: 11    Omega-3 Fatty Acids (FISH OIL) 1200 MG capsule capsule, Take 1 capsule by mouth 2 (Two) Times a Day. " Hold for surgery, Disp: , Rfl:     tadalafil (Cialis) 10 MG tablet, Take 1 tablet by mouth Daily As Needed for Erectile Dysfunction., Disp: 30 tablet, Rfl: 0     Objective     Review of Systems    Physical Exam  Vitals and nursing note reviewed.   Constitutional:       Appearance: Normal appearance.      Comments: Pleasant, neatly groomed, no distress.   Neck:      Vascular: No carotid bruit.   Cardiovascular:      Rate and Rhythm: Regular rhythm.      Heart sounds: Normal heart sounds. No murmur heard.     No gallop.   Pulmonary:      Effort: No respiratory distress.      Breath sounds: Normal breath sounds. No wheezing or rales.   Neurological:      Mental Status: He is alert and oriented to person, place, and time.   Psychiatric:         Mood and Affect: Mood normal.         Behavior: Behavior normal.       Physical Exam  Heart sounds are good. No murmurs.    Results  Imaging  Vascular scan showed mild stenosis in right internal carotid artery.    Testing  Stress test was normal.    Assessment & Plan    Problems Addressed this Visit          Endocrine and Metabolic    Hyperglycemia    Relevant Orders    Hemoglobin A1c       Neuro    Neuropathy     Other Visit Diagnoses       Peripheral polyneuropathy    -  Primary    Relevant Orders    Comprehensive Metabolic Panel    Vitamin B12    TSH Rfx On Abnormal To Free T4    EMG & Nerve Conduction Test    Unspecified mononeuropathy of bilateral lower limbs        Relevant Orders    EMG & Nerve Conduction Test          Diagnoses         Codes Comments    Peripheral polyneuropathy    -  Primary ICD-10-CM: G62.9  ICD-9-CM: 356.9     Hyperglycemia     ICD-10-CM: R73.9  ICD-9-CM: 790.29     Neuropathy     ICD-10-CM: G62.9  ICD-9-CM: 355.9     Unspecified mononeuropathy of bilateral lower limbs     ICD-10-CM: G57.93  ICD-9-CM: 355.8           Assessment & Plan  1. Peripheral Neuropathy.  He reports numbness and burning sensations in both legs, more pronounced in one toe, which  has been ongoing for a year or two. He has a history of mild stenosis in the right internal carotid artery and varicose veins. His vascular scan and stress test results were normal. Neuropathy can be caused by several factors, including vitamin B12 deficiencies, thyroid abnormalities, and pinched nerves. His alcohol consumption is minimal and unlikely to cause neuropathy. A nerve conduction study (EMG) will be conducted to determine if there is a nerve issue or a problem in his back. Lab tests will be conducted to check his thyroid, B12, and hemoglobin A1c levels. A referral will be made for him to see a neurologist for the nerve conduction study.    2. Medication Management.  He is currently on a statin drug and metoprolol. He tolerates these medications well, with no significant side effects reported. He was previously on a higher dose of B12 but is now taking it twice a week.    Follow-up  Return 1 week after the nerve conduction study.      No follow-ups on file.    Patient or patient representative verbalized consent for the use of Ambient Listening during the visit with  Moose Chase MD for chart documentation. 10/9/2024  16:48 EDT  Answers submitted by the patient for this visit:  Other (Submitted on 10/2/2024)  Please describe your symptoms.: feet tingling  Have you had these symptoms before?: Yes  How long have you been having these symptoms?: Greater than 2 weeks  Primary Reason for Visit (Submitted on 10/2/2024)  What is the primary reason for your visit?: Problem Not Listed

## 2024-10-10 LAB
ALBUMIN SERPL-MCNC: 4.5 G/DL (ref 3.5–5.2)
ALBUMIN/GLOB SERPL: 2.1 G/DL
ALP SERPL-CCNC: 56 U/L (ref 39–117)
ALT SERPL-CCNC: 16 U/L (ref 1–41)
AST SERPL-CCNC: 22 U/L (ref 1–40)
BILIRUB SERPL-MCNC: 2.1 MG/DL (ref 0–1.2)
BUN SERPL-MCNC: 18 MG/DL (ref 8–23)
BUN/CREAT SERPL: 18 (ref 7–25)
CALCIUM SERPL-MCNC: 9.3 MG/DL (ref 8.6–10.5)
CHLORIDE SERPL-SCNC: 104 MMOL/L (ref 98–107)
CO2 SERPL-SCNC: 27.6 MMOL/L (ref 22–29)
CREAT SERPL-MCNC: 1 MG/DL (ref 0.76–1.27)
EGFRCR SERPLBLD CKD-EPI 2021: 81 ML/MIN/1.73
GLOBULIN SER CALC-MCNC: 2.1 GM/DL
GLUCOSE SERPL-MCNC: 90 MG/DL (ref 65–99)
HBA1C MFR BLD: 5.1 % (ref 4.8–5.6)
POTASSIUM SERPL-SCNC: 5.1 MMOL/L (ref 3.5–5.2)
PROT SERPL-MCNC: 6.6 G/DL (ref 6–8.5)
SODIUM SERPL-SCNC: 140 MMOL/L (ref 136–145)
TSH SERPL DL<=0.005 MIU/L-ACNC: 2 UIU/ML (ref 0.27–4.2)
VIT B12 SERPL-MCNC: 697 PG/ML (ref 211–946)

## 2024-11-22 ENCOUNTER — APPOINTMENT (OUTPATIENT)
Dept: CT IMAGING | Facility: HOSPITAL | Age: 69
End: 2024-11-22
Payer: MEDICARE

## 2024-11-22 ENCOUNTER — HOSPITAL ENCOUNTER (EMERGENCY)
Facility: HOSPITAL | Age: 69
Discharge: HOME | End: 2024-11-22
Attending: EMERGENCY MEDICINE
Payer: MEDICARE

## 2024-11-22 VITALS
TEMPERATURE: 97.4 F | SYSTOLIC BLOOD PRESSURE: 121 MMHG | DIASTOLIC BLOOD PRESSURE: 69 MMHG | HEART RATE: 62 BPM | OXYGEN SATURATION: 96 % | WEIGHT: 175 LBS | BODY MASS INDEX: 24.5 KG/M2 | RESPIRATION RATE: 16 BRPM | HEIGHT: 71 IN

## 2024-11-22 DIAGNOSIS — J06.9 UPPER RESPIRATORY TRACT INFECTION, UNSPECIFIED TYPE: Primary | ICD-10-CM

## 2024-11-22 LAB
ALBUMIN SERPL-MCNC: 4 G/DL (ref 3.5–5.2)
ALBUMIN/GLOB SERPL: 1.6 G/DL
ALP SERPL-CCNC: 51 U/L (ref 39–117)
ALT SERPL W P-5'-P-CCNC: 14 U/L (ref 1–41)
ANION GAP SERPL CALCULATED.3IONS-SCNC: 7.2 MMOL/L (ref 5–15)
APTT PPP: 29.1 SECONDS (ref 22.7–35.4)
AST SERPL-CCNC: 20 U/L (ref 1–40)
BASOPHILS # BLD AUTO: 0.02 10*3/MM3 (ref 0–0.2)
BASOPHILS NFR BLD AUTO: 0.6 % (ref 0–1.5)
BILIRUB SERPL-MCNC: 1.5 MG/DL (ref 0–1.2)
BUN SERPL-MCNC: 9 MG/DL (ref 8–23)
BUN/CREAT SERPL: 9 (ref 7–25)
CALCIUM SPEC-SCNC: 8.9 MG/DL (ref 8.6–10.5)
CHLORIDE SERPL-SCNC: 99 MMOL/L (ref 98–107)
CO2 SERPL-SCNC: 27.8 MMOL/L (ref 22–29)
CREAT SERPL-MCNC: 1 MG/DL (ref 0.76–1.27)
DEPRECATED RDW RBC AUTO: 41.3 FL (ref 37–54)
EGFRCR SERPLBLD CKD-EPI 2021: 80.5 ML/MIN/1.73
EOSINOPHIL # BLD AUTO: 0.01 10*3/MM3 (ref 0–0.4)
EOSINOPHIL NFR BLD AUTO: 0.3 % (ref 0.3–6.2)
ERYTHROCYTE [DISTWIDTH] IN BLOOD BY AUTOMATED COUNT: 11.5 % (ref 12.3–15.4)
GLOBULIN UR ELPH-MCNC: 2.5 GM/DL
GLUCOSE SERPL-MCNC: 93 MG/DL (ref 65–99)
HCT VFR BLD AUTO: 45.4 % (ref 37.5–51)
HGB BLD-MCNC: 15.5 G/DL (ref 13–17.7)
HOLD SPECIMEN: NORMAL
HOLD SPECIMEN: NORMAL
IMM GRANULOCYTES # BLD AUTO: 0.01 10*3/MM3 (ref 0–0.05)
IMM GRANULOCYTES NFR BLD AUTO: 0.3 % (ref 0–0.5)
INR PPP: 1.11 (ref 0.9–1.1)
LYMPHOCYTES # BLD AUTO: 0.87 10*3/MM3 (ref 0.7–3.1)
LYMPHOCYTES NFR BLD AUTO: 26.7 % (ref 19.6–45.3)
MCH RBC QN AUTO: 33.4 PG (ref 26.6–33)
MCHC RBC AUTO-ENTMCNC: 34.1 G/DL (ref 31.5–35.7)
MCV RBC AUTO: 97.8 FL (ref 79–97)
MONOCYTES # BLD AUTO: 0.61 10*3/MM3 (ref 0.1–0.9)
MONOCYTES NFR BLD AUTO: 18.7 % (ref 5–12)
NEUTROPHILS NFR BLD AUTO: 1.74 10*3/MM3 (ref 1.7–7)
NEUTROPHILS NFR BLD AUTO: 53.4 % (ref 42.7–76)
NRBC BLD AUTO-RTO: 0 /100 WBC (ref 0–0.2)
PLATELET # BLD AUTO: 118 10*3/MM3 (ref 140–450)
PMV BLD AUTO: 9.5 FL (ref 6–12)
POTASSIUM SERPL-SCNC: 4.1 MMOL/L (ref 3.5–5.2)
PROT SERPL-MCNC: 6.5 G/DL (ref 6–8.5)
PROTHROMBIN TIME: 14.6 SECONDS (ref 11.7–14.2)
RBC # BLD AUTO: 4.64 10*6/MM3 (ref 4.14–5.8)
SODIUM SERPL-SCNC: 134 MMOL/L (ref 136–145)
WBC NRBC COR # BLD AUTO: 3.26 10*3/MM3 (ref 3.4–10.8)
WHOLE BLOOD HOLD COAG: NORMAL
WHOLE BLOOD HOLD SPECIMEN: NORMAL

## 2024-11-22 PROCEDURE — 36415 COLL VENOUS BLD VENIPUNCTURE: CPT

## 2024-11-22 PROCEDURE — 99285 EMERGENCY DEPT VISIT HI MDM: CPT

## 2024-11-22 PROCEDURE — 85730 THROMBOPLASTIN TIME PARTIAL: CPT | Performed by: EMERGENCY MEDICINE

## 2024-11-22 PROCEDURE — 80053 COMPREHEN METABOLIC PANEL: CPT | Performed by: EMERGENCY MEDICINE

## 2024-11-22 PROCEDURE — 85025 COMPLETE CBC W/AUTO DIFF WBC: CPT | Performed by: EMERGENCY MEDICINE

## 2024-11-22 PROCEDURE — 25510000001 IOPAMIDOL 61 % SOLUTION: Performed by: EMERGENCY MEDICINE

## 2024-11-22 PROCEDURE — 85610 PROTHROMBIN TIME: CPT | Performed by: EMERGENCY MEDICINE

## 2024-11-22 PROCEDURE — 71260 CT THORAX DX C+: CPT

## 2024-11-22 RX ORDER — SODIUM CHLORIDE 0.9 % (FLUSH) 0.9 %
10 SYRINGE (ML) INJECTION AS NEEDED
Status: DISCONTINUED | OUTPATIENT
Start: 2024-11-22 | End: 2024-11-22 | Stop reason: HOSPADM

## 2024-11-22 RX ORDER — IOPAMIDOL 612 MG/ML
100 INJECTION, SOLUTION INTRAVASCULAR
Status: COMPLETED | OUTPATIENT
Start: 2024-11-22 | End: 2024-11-22

## 2024-11-22 RX ORDER — BENZONATATE 100 MG/1
100 CAPSULE ORAL 2 TIMES DAILY PRN
Qty: 15 CAPSULE | Refills: 0 | Status: SHIPPED | OUTPATIENT
Start: 2024-11-22 | End: 2024-11-29

## 2024-11-22 RX ADMIN — IOPAMIDOL 75 ML: 612 INJECTION, SOLUTION INTRAVENOUS at 13:28

## 2024-11-22 NOTE — ED PROVIDER NOTES
EMERGENCY DEPARTMENT ENCOUNTER  Room Number:  31/31  PCP: Moose Chase MD  Independent Historians: Patient      HPI:  Chief Complaint: Cough and congestion, abnormal chest x-ray finding    A complete HPI/ROS/PMH/PSH/SH/FH are unobtainable due to: None    Chronic or social conditions impacting patient care (Social Determinants of Health): None      Context: Terrance Gresham is a 69 y.o. male with a medical history of hypertension and kidney stones who presents to the ED c/o acute cough and congestion symptoms that has been worsening for the past few days.  He was seen at an immediate care center earlier today and had a chest x-ray which was worrisome for the possibility of a new mass finding.  He was advised to come here for CT imaging and further clarification.  Patient denies any hemoptysis.  He has had some chest pain with deep inspirations at times, but no chest pain at rest.  Denies any fevers or chills.  He is not a smoker.  Denies any abdominal pain or vomiting.      Review of prior external notes (non-ED) -and- Review of prior external test results outside of this encounter: Independently reviewed the chest x-ray study from earlier today, November 22, 2024, which was obtained at the [facility].  Findings included:  1. Nodularity along the superior margin of the right hilum with linear   opacities extending toward the periphery of the right upper lobe. A   right hilar mass with postobstructive linear atelectasis and   parenchymal scarring could both have this appearance. A CT scan of the chest with intravenous contrast is recommended to further evaluate the hilum and right lung.       Prescription drug monitoring program review: [facility] reviewed by Joselo Keenan MD       PAST MEDICAL HISTORY  Active Ambulatory Problems     Diagnosis Date Noted    Hypogonadism in male 05/09/2016    Hypercholesterolemia 05/09/2016    Gastroesophageal reflux disease 05/09/2016    Paresthesia of lower  extremity 05/09/2016    Thoracic back pain 05/09/2016    Erectile dysfunction of nonorganic origin 05/09/2016    Well adult exam 11/17/2016    Leukopenia 08/29/2018    History of colon polyps 09/07/2018    Medicare annual wellness visit, subsequent 01/19/2022    Community acquired pneumonia 04/29/2022    Pneumonia due to Streptococcus pneumoniae 04/30/2022    Influenza A 04/30/2022    Elevated d-dimer 05/02/2022    Bacteremia due to Streptococcus pneumoniae 05/02/2022    Annual physical exam 06/10/2022    Renal calculi 07/05/2023    Atherosclerosis of native coronary artery of native heart with angina pectoris 01/12/2024    Neuropathy 07/15/2024    Varicose veins of ankle 07/15/2024    Spider veins 07/15/2024    Hyperglycemia 10/09/2024     Resolved Ambulatory Problems     Diagnosis Date Noted    Adiposity 05/09/2016    Essential hypertension 05/09/2016    Chest pain, atypical 02/24/2017    Depression with anxiety 03/24/2020    Dyspnea 04/30/2022     Past Medical History:   Diagnosis Date    Cataract September, October 2022    Chest pain 04/06/2023    Coronary artery disease     Disc disorder     Enlarged prostate     History of exertional chest pain 05/2022    Hx of colonic polyp     Hyperlipidemia     Hypertension     Kidney stone 2007    Nephrolithiasis     Pneumonia 2022         PAST SURGICAL HISTORY  Past Surgical History:   Procedure Laterality Date    CARDIAC CATHETERIZATION N/A 8/1/2023    Procedure: Left Heart Cath;  Surgeon: Ariel Landeros MD;  Location: [facility] INVASIVE LOCATION;  Service: Cardiovascular;  Laterality: N/A;    CARDIAC CATHETERIZATION N/A 8/1/2023    Procedure: Coronary angiography;  Surgeon: Ariel Landeros MD;  Location: [facility] INVASIVE LOCATION;  Service: Cardiovascular;  Laterality: N/A;    CATARACT EXTRACTION W/ INTRAOCULAR LENS IMPLANT Bilateral 2022    COLONOSCOPY N/A 12/10/2018    Procedure: COLONOSCOPY to cecuman ti;  Surgeon: Jose Montgomery MD;  Location: [facility]  ENDOSCOPY;  Service: Gastroenterology    COLONOSCOPY W/ POLYPECTOMY      CYST REMOVAL Right 2012    wrist     CYSTOSCOPY      CYSTOSCOPY W/ LASER LITHOTRIPSY      EXTRACORPOREAL SHOCK WAVE LITHOTRIPSY (ESWL) Left 7/5/2023    Procedure: LEFT  SHOCKWAVE LITHOTRIPSY;  Surgeon: Abel Pinto MD;  Location: [facility] MAIN OR;  Service: Urology;  Laterality: Left;    KNEE ARTHROSCOPY Right 2015    KNEE SURGERY Right 01/13/2016         FAMILY HISTORY  Family History   Problem Relation Age of Onset    Stomach cancer Mother     Stroke Mother     Breast cancer Mother     Lung cancer Mother     Prostate cancer Father     Alzheimer's disease Father     Kidney disease Father     Heart disease Father         Triple bypass    Hypertension Father     No Known Problems Sister     Prostate cancer Brother 60    Obesity Brother     Sleep apnea Brother     Other Maternal Grandmother         Circulatory /heart problems    Heart attack Maternal Grandfather     Cancer Paternal Grandfather     Malig Hyperthermia Neg Hx          SOCIAL HISTORY  Social History     Socioeconomic History    Marital status:      Spouse name: Kimberly    Years of education: College   Tobacco Use    Smoking status: Never     Passive exposure: Never (mother smoked in the home)    Smokeless tobacco: Never    Tobacco comments:     No caffeine   Vaping Use    Vaping status: Never Used   Substance and Sexual Activity    Alcohol use: Yes     Alcohol/week: 4.0 standard drinks of alcohol     Types: 2 Glasses of wine, 2 Cans of beer per week     Comment: rarely    Drug use: No    Sexual activity: Not Currently     Partners: Female     Birth control/protection: Hysterectomy         ALLERGIES  Sulfa antibiotics      REVIEW OF SYSTEMS  Review of Systems  Included in HPI  All systems reviewed and negative except for those discussed in HPI.      PHYSICAL EXAM    I have reviewed the triage vital signs and nursing notes.    ED Triage Vitals   Temp Heart Rate Resp BP SpO2    11/22/24 1048 11/22/24 1048 11/22/24 1048 11/22/24 1147 11/22/24 1048   97.4 °F (36.3 °C) 67 16 141/89 98 %      Temp src Heart Rate Source Patient Position BP Location FiO2 (%)   11/22/24 1048 11/22/24 1048 -- -- --   Tympanic Monitor          Physical Exam  GENERAL: alert, no acute distress nontoxic-appearing  SKIN: Warm, dry, no rashes  HENT: Normocephalic, atraumatic  EYES: no scleral icterus, normal conjunctivae  CV: regular rhythm, regular rate  RESPIRATORY: normal effort, no stridor.  Mild scattered rhonchi noted.  No coughing during my exam.  ABDOMEN: soft, nondistended, nontender in all quadrants  MUSCULOSKELETAL: no deformity, no edema of the extremities  NEURO: alert, moves all extremities, follows commands      LAB RESULTS  Recent Results (from the past 24 hours)   Green Top (Gel)    Collection Time: 11/22/24 11:52 AM   Result Value Ref Range    Extra Tube Hold for add-ons.    Lavender Top    Collection Time: 11/22/24 11:52 AM   Result Value Ref Range    Extra Tube hold for add-on    Gold Top - SST    Collection Time: 11/22/24 11:52 AM   Result Value Ref Range    Extra Tube Hold for add-ons.    Light Blue Top    Collection Time: 11/22/24 11:52 AM   Result Value Ref Range    Extra Tube Hold for add-ons.    Comprehensive Metabolic Panel    Collection Time: 11/22/24 11:52 AM    Specimen: Arm, Left; Blood   Result Value Ref Range    Glucose 93 65 - 99 mg/dL    BUN 9 8 - 23 mg/dL    Creatinine 1.00 0.76 - 1.27 mg/dL    Sodium 134 (L) 136 - 145 mmol/L    Potassium 4.1 3.5 - 5.2 mmol/L    Chloride 99 98 - 107 mmol/L    CO2 27.8 22.0 - 29.0 mmol/L    Calcium 8.9 8.6 - 10.5 mg/dL    Total Protein 6.5 6.0 - 8.5 g/dL    Albumin 4.0 3.5 - 5.2 g/dL    ALT (SGPT) 14 1 - 41 U/L    AST (SGOT) 20 1 - 40 U/L    Alkaline Phosphatase 51 39 - 117 U/L    Total Bilirubin 1.5 (H) 0.0 - 1.2 mg/dL    Globulin 2.5 gm/dL    A/G Ratio 1.6 g/dL    BUN/Creatinine Ratio 9.0 7.0 - 25.0    Anion Gap 7.2 5.0 - 15.0 mmol/L    eGFR 80.5  >60.0 mL/min/1.73   Protime-INR    Collection Time: 11/22/24 11:52 AM    Specimen: Arm, Left; Blood   Result Value Ref Range    Protime 14.6 (H) 11.7 - 14.2 Seconds    INR 1.11 (H) 0.90 - 1.10   aPTT    Collection Time: 11/22/24 11:52 AM    Specimen: Arm, Left; Blood   Result Value Ref Range    PTT 29.1 22.7 - 35.4 seconds   CBC Auto Differential    Collection Time: 11/22/24 11:52 AM    Specimen: Arm, Left; Blood   Result Value Ref Range    WBC 3.26 (L) 3.40 - 10.80 10*3/mm3    RBC 4.64 4.14 - 5.80 10*6/mm3    Hemoglobin 15.5 13.0 - 17.7 g/dL    Hematocrit 45.4 37.5 - 51.0 %    MCV 97.8 (H) 79.0 - 97.0 fL    MCH 33.4 (H) 26.6 - 33.0 pg    MCHC 34.1 31.5 - 35.7 g/dL    RDW 11.5 (L) 12.3 - 15.4 %    RDW-SD 41.3 37.0 - 54.0 fl    MPV 9.5 6.0 - 12.0 fL    Platelets 118 (L) 140 - 450 10*3/mm3    Neutrophil % 53.4 42.7 - 76.0 %    Lymphocyte % 26.7 19.6 - 45.3 %    Monocyte % 18.7 (H) 5.0 - 12.0 %    Eosinophil % 0.3 0.3 - 6.2 %    Basophil % 0.6 0.0 - 1.5 %    Immature Grans % 0.3 0.0 - 0.5 %    Neutrophils, Absolute 1.74 1.70 - 7.00 10*3/mm3    Lymphocytes, Absolute 0.87 0.70 - 3.10 10*3/mm3    Monocytes, Absolute 0.61 0.10 - 0.90 10*3/mm3    Eosinophils, Absolute 0.01 0.00 - 0.40 10*3/mm3    Basophils, Absolute 0.02 0.00 - 0.20 10*3/mm3    Immature Grans, Absolute 0.01 0.00 - 0.05 10*3/mm3    nRBC 0.0 0.0 - 0.2 /100 WBC         RADIOLOGY  CT Chest With Contrast Diagnostic    Result Date: 11/22/2024  CT CHEST W CONTRAST DIAGNOSTIC-  INDICATION: Lung mass reported on x-ray  COMPARISON: CTA chest May 13, 2022  TECHNIQUE: Routine CT chest with IV contrast. Coronal and sagittal reformats. Radiation dose reduction techniques were utilized, including automated exposure control and exposure modulation based on body size.  FINDINGS:  Chest wall: No lymphadenopathy.  Mediastinum: Three-vessel coronary artery atherosclerotic calcifications. Heart is normal in size. No pericardial effusion. Calcified mediastinal and left hilar  lymph nodes, consistent with prior granulomatous infection. No mediastinal or hilar lymphadenopathy.  Lungs/pleura: No effusions. Patent central airways. Biapical pleural-parenchymal thickening. Solid pulmonary nodule in the apical right upper lobe, series 3, axial image 12, measures 4 mm, unchanged. Bronchovascular opacity in the posterior right upper lobe, with volume loss and distortion, suspect scarring, with previously seen consolidated lung at this site on CT performed May 13, 2022. Solid pulmonary nodule in the lateral segment right middle lobe, series 3, axial mage 59, measures 4 mm, unchanged. Solid pulmonary nodule in the lingula, series 3, axial mage 74, measures 3 mm, unchanged. Additional stable pulmonary nodules. No new or enlarging pulmonary nodules identified.  Upper abdomen: Calcifications in the spleen, consistent with prior granulomatous infection. Bilateral nonobstructing nephrolithiasis. For example, nonobstructing nephrolithiasis in the left mid kidney, series 2, axial mage 106, measures 8 mm. Fluid attenuating cyst in the left mid kidney, incompletely imaged. Small hiatal hernia.  Osseous structures: Thoracic spondylosis with exaggerated kyphosis.       1. Bronchovascular opacity in the posterior right upper lobe, with volume loss and distortion, suspect postpneumonic scarring. 2. Stable pulmonary nodules. No new or enlarging pulmonary nodules seen. 3. Three-vessel coronary artery atherosclerotic calcifications. 4. Nonobstructing nephrolithiasis  This report was finalized on 11/22/2024 1:58 PM by Dr. Onel Millan M.D on Workstation: [id number]      XR Chest 2 View    Result Date: 11/22/2024  REVIEWING YOUR TEST RESULTS IN [facility] IS NOT A SUBSTITUTE FOR DISCUSSING THOSE RESULTS WITH YOUR HEALTH CARE PROVIDER. PLEASE CONTACT YOUR PROVIDER VIA [employer] TO DISCUSS ANY QUESTIONS OR CONCERNS YOU MAY HAVE REGARDING THESE TEST RESULTS.  RADIOLOGY REPORT FACILITY:  [facility]  SERVICES UNIT/AGE/GENDER: JERAMYDU  OP      AGE:69 Y          SEX:M PATIENT NAME/:  NEHA SOMERS D    1955 UNIT NUMBER:  [id number] ACCOUNT NUMBER:  [id number] ACCESSION NUMBER:  [id number] EXAMINATION: XR CHEST 2VW DATE: 2024 HISTORY: cough and chest pain. COMPARISON: None. FINDINGS: Two views of the chest demonstrate irregular linear opacity in the right upper lobe with slight nodularity along the suture. Margin of the right hilum. The left lung is clear. There is no pleural effusion or pneumothorax. The cardiac and mediastinal contours are normal. IMPRESSION: 1. Nodularity along the superior margin of the right hilum with linear opacities extending toward the periphery of the right upper lobe. A right hilar mass with postobstructive linear atelectasis and parenchymal scarring could both have this appearance. A CT scan of the chest with intravenous contrast is recommended to further evaluate the hilum and right lung. Dictated by: Husam Simons M.D. Images and Report reviewed and interpreted by: Husam Simons M.D. <PS><Electronically signed by: Husam Simons M.D.> 2024 1020 D: 2024 1018 T: 2024 1018       MEDICATIONS GIVEN IN ER  Medications   sodium chloride 0.9 % flush 10 mL (has no administration in time range)   iopamidol (ISOVUE-300) 61 % injection 100 mL (75 mL Intravenous Given 24 1328)         ORDERS PLACED DURING THIS VISIT:  Orders Placed This Encounter   Procedures    CT Chest With Contrast Diagnostic    [place] Draw    Comprehensive Metabolic Panel    Protime-INR    aPTT    CBC Auto Differential    Monitor Blood Pressure    Continuous Pulse Oximetry    Insert Peripheral IV    Green Top (Gel)    Lavender Top    Gold Top - SST    Light Blue Top    CBC & Differential         OUTPATIENT MEDICATION MANAGEMENT:  Current Facility-Administered Medications Ordered in Epic   Medication Dose Route Frequency Provider Last Rate Last Admin    sodium chloride 0.9 % flush  10 mL  10 mL Intravenous PRN Joselo Keenan MD         Current Outpatient Medications Ordered in Epic   Medication Sig Dispense Refill    aspirin 81 MG EC tablet Take 1 tablet by mouth Daily. 90 tablet 3    atorvastatin (LIPITOR) 20 MG tablet TAKE 1 TABLET BY MOUTH DAILY 90 tablet 1    Cholecalciferol (VITAMIN D3) 2000 UNITS capsule Take 1 capsule by mouth Daily.      Cyanocobalamin (B-12 PO) Take 1,000 mcg by mouth 1 (One) Time Per Week.      metoprolol tartrate (LOPRESSOR) 25 MG tablet Take 1 tablet by mouth 2 (Two) Times a Day. 60 tablet 11    Omega-3 Fatty Acids (FISH OIL) 1200 MG capsule capsule Take 1 capsule by mouth 2 (Two) Times a Day. Hold for surgery      benzonatate (TESSALON) 100 MG capsule Take 1 capsule by mouth 2 (Two) Times a Day As Needed for Cough for up to 7 days. 15 capsule 0    tadalafil (Cialis) 10 MG tablet Take 1 tablet by mouth Daily As Needed for Erectile Dysfunction. 30 tablet 0         PROCEDURES  Procedures          PROGRESS, DATA ANALYSIS, CONSULTS, AND MEDICAL DECISION MAKING  All labs have been independently interpreted by me.  All radiology studies have been reviewed by me. All EKG's have been independently viewed and interpreted by me.  Discussion below represents my analysis of pertinent findings related to patient's condition, differential diagnosis, treatment plan and final disposition.    Differential diagnosis includes but is not limited to pneumonia, pulmonary embolism, lung mass, bronchitis, sarcoidosis.    Clinical Scores:                   ED Course as of 11/22/24 1417   Fri Nov 22, 2024   1306 Sodium(!): 134 [EMELIA]   1306 INR(!): 1.11 [EMELIA]   1306 Protime(!): 14.6 [EMELIA]   1306 WBC(!): 3.26 [EMELIA]   1401 Independently interpreted the CT chest with contrast study my findings are: No pneumothorax, no apparent lung mass [EMELIA]   1403 I have reviewed all the test results with the patient at the bedside.  His work of breathing is normal and his oxygenation is normal on room air.  The  "CT scan is reassuring as there is no particular evidence of a mass process.  Rather I think the abnormality in the right upper lung is consistent with scarring from prior pneumonia infection.  Patient is dealing with some URI symptoms and I think we can prescribe some medications for him to help with his cough and congestion and sinus symptoms while he plans to follow-up with his PCP.  No need for further testing at this time.  Will review with him the usual \"return to ER \"instructions prior to discharge. [EMELIA]      ED Course User Index  [EMELIA] Joselo Keenan MD             AS OF 14:17 EST VITALS:    BP - 121/69  HR - 62  TEMP - 97.4 °F (36.3 °C) (Tympanic)  O2 SATS - 96%    COMPLEXITY OF CARE  Admission was considered but after careful review of the patient's presentation, physical examination, diagnostic results, and response to treatment the patient may be safely discharged with outpatient follow-up.      DIAGNOSIS  Final diagnoses:   Upper respiratory tract infection, unspecified type         DISPOSITION  ED Disposition       ED Disposition   Discharge    Condition   Stable    Comment   --                Please note that portions of this document were completed with a voice recognition program.    Note Disclaimer: At [facility], we believe that sharing information builds trust and better relationships. You are receiving this note because you recently visited [facility]. It is possible you will see health information before a provider has talked with you about it. This kind of information can be easy to misunderstand. To help you fully understand what it means for your health, we urge you to discuss this note with your provider.         Joselo Keenan MD  11/22/24 1417    "

## 2024-11-25 ENCOUNTER — PATIENT OUTREACH (OUTPATIENT)
Dept: CASE MANAGEMENT | Facility: OTHER | Age: 69
End: 2024-11-25
Payer: MEDICARE

## 2024-11-25 NOTE — OUTREACH NOTE
AMBULATORY CASE MANAGEMENT NOTE    Names and Relationships of Patient/Support Persons: Contact: Terrance Gresham; Relationship: Self -     Patient Outreach  RN-ACM outreach with patient. Discussed 11/22/24 ED visit regarding upper respiratory infection. Patient treated and discharged. Patient states to be compliant with ED recommendations; taking Benzonatate as ED directed and states symptoms have improved. Patient states improvement regarding cough but at times has productive cough of greenish sputum. Patient states no difficulty with fever; chest pain; SOB; appetite or sleeping. Patient states to be compliant with medications and medical appointments. Reviewed with patient ED AVS recommendations; education; role of RN-ACM and HRCM case management services. Patient verbalized understanding. Patient states to appreciate outreach and declines needs for further outreach at this time. No further questions voiced at this time.   Adult Patient Profile  Social Work Assessment  Send Education  Questions/Answers      Flowsheet Row Most Recent Value   Other Patient Education/Resources  24/7 [facility] Nurse Call Line, [facility]   24/7 Nurse Call Line Education Method Verbal        SDOH updated and reviewed with the patient during this program:  --     Food Insecurity: No Food Insecurity (11/25/2024)    Hunger Vital Sign     Worried About Running Out of Food in the Last Year: Never true     Ran Out of Food in the Last Year: Never true      --     Transportation Needs: No Transportation Needs (11/25/2024)    PRAPARE - Transportation     Lack of Transportation (Medical): No     Lack of Transportation (Non-Medical): No       Education Documentation  unresolved or worsening symptoms, taught by Mini Harvey RN at 11/25/2024  1:12 PM.  Learner: Patient  Readiness: Acceptance  Method: Explanation  Response: Verbalizes Understanding    Unresolved/Worsening Symptoms, taught by Mini Harvey, SAVANAH at 11/25/2024  1:12  PM.  Learner: Patient  Readiness: Acceptance  Method: Explanation  Response: Verbalizes Understanding    Fluid/Food Intake, taught by Mini Harvey, SAVANAH at 11/25/2024  1:12 PM.  Learner: Patient  Readiness: Acceptance  Method: Explanation  Response: Verbalizes Understanding    Signs/Symptoms, taught by Mini Harvey, SAVANAH at 11/25/2024  1:12 PM.  Learner: Patient  Readiness: Acceptance  Method: Explanation  Response: Verbalizes Understanding          Mini GARCIA  Ambulatory Case Management    11/25/2024, 13:12 EST

## 2024-12-11 ENCOUNTER — HOSPITAL ENCOUNTER (OUTPATIENT)
Dept: NEUROLOGY | Facility: HOSPITAL | Age: 69
Discharge: HOME | End: 2024-12-11
Admitting: PSYCHIATRY & NEUROLOGY
Payer: MEDICARE

## 2024-12-11 DIAGNOSIS — G57.93 UNSPECIFIED MONONEUROPATHY OF BILATERAL LOWER LIMBS: ICD-10-CM

## 2024-12-11 DIAGNOSIS — G62.9 PERIPHERAL POLYNEUROPATHY: ICD-10-CM

## 2024-12-11 PROCEDURE — 95910 NRV CNDJ TEST 7-8 STUDIES: CPT | Performed by: PSYCHIATRY & NEUROLOGY

## 2024-12-11 PROCEDURE — 95886 MUSC TEST DONE W/N TEST COMP: CPT | Performed by: PSYCHIATRY & NEUROLOGY

## 2024-12-11 NOTE — PROCEDURES
EMG and Nerve Conduction Studies    I.      Instrument used: Neuromax 1002  II.     Please see data sheets for tabular summary of NCS and details on methods, temperatures and lab standards.   III.    EMG muscles tested for upper extremity studies include the deltoid, biceps, triceps, pronator teres, extensor digitorum communis, first dorsal interosseous and abductor pollicis brevis.    IV.   EMG muscles tested for lower extremity studies include the vastus lateralis, tibialis anterior, peroneus longus, medial gastrocnemius and extensor digitorum brevis.    V.    Additional muscles tested as needed.  Paraspinal muscles tested as needed.   VI.   Please see data sheets for tabular summary of EMG findings.   VII. The complete report includes the data sheets.      Indication: Numbness tingling and heat in the feet for 6 to 8 months  History: 69-year-old male who describes numbness tingling in the feeling of heat in his feet for about the last 6 or 8 months.  He has some bouts of low back pain but not currently having any significant back pain.  He denies history of diabetes or thyroid disease.      Ht: 71 inches  Wt: 175 pounds  HbA1C:   Lab Results   Component Value Date    HGBA1C 5.10 10/09/2024     TSH:   Lab Results   Component Value Date    TSH 2.000 10/09/2024       Technical summary:  Nerve conduction studies were obtained in the right leg with 1 comparison on the left.  Skin temperatures were a bit cold but temperature correction was not needed.  Needle examination was obtained on selected muscles in both legs.    Results:  1.  Normal right sural sensory distal latency and amplitude.  2.  Normal right superficial peroneal sensory distal latency with normal amplitude.  Prolonged left superficial peroneal sensory distal latency at 4.4 ms with low amplitude of 2.2 µV.  3.  Slow right peroneal motor velocities at 38.4 m/s below the knee and 36.4 m/s in the short segment across the fibular head.  Normal distal latency  and amplitudes.  Normal left peroneal motor velocities with normal distal latency and amplitudes.  4.  Slow right peroneal motor velocity at 38.9 m/s with normal distal latency and amplitudes.  Normal left tibial motor conduction velocity with a normal distal latency and amplitudes.  5.  Needle examination of selected muscles in both legs showed normal insertional activities throughout with normal motor units and recruitment patterns except for the extensor digitorum brevis showing increased firing rate and reduced interference patterns bilaterally.  Lumbar paraspinals at L5 showed no abnormality on either side.    Impression:  Abnormal study showing mild peripheral neuropathy.  The needle exam changes were consistent with the nerve conduction findings.  Study results were discussed with the patient.    Sascha Pinto M.D.              Dictated utilizing Dragon dictation.

## 2024-12-18 ENCOUNTER — OFFICE VISIT (OUTPATIENT)
Dept: FAMILY MEDICINE CLINIC | Facility: CLINIC | Age: 69
End: 2024-12-18
Payer: MEDICARE

## 2024-12-18 VITALS
DIASTOLIC BLOOD PRESSURE: 74 MMHG | HEART RATE: 72 BPM | SYSTOLIC BLOOD PRESSURE: 116 MMHG | TEMPERATURE: 96.8 F | BODY MASS INDEX: 24.57 KG/M2 | WEIGHT: 175.5 LBS | OXYGEN SATURATION: 96 % | RESPIRATION RATE: 16 BRPM | HEIGHT: 71 IN

## 2024-12-18 DIAGNOSIS — G62.9 NEUROPATHY: ICD-10-CM

## 2024-12-18 DIAGNOSIS — I25.119 ATHEROSCLEROSIS OF NATIVE CORONARY ARTERY OF NATIVE HEART WITH ANGINA PECTORIS: Primary | ICD-10-CM

## 2024-12-18 DIAGNOSIS — E78.00 HYPERCHOLESTEROLEMIA: ICD-10-CM

## 2024-12-20 NOTE — PROGRESS NOTES
Answers submitted by the patient for this visit:  Primary Reason for Visit (Submitted on 12/14/2024)  What is the primary reason for your visit?: Problem Not Listed  Subjective   Terrance Gresham is a 69 y.o. male. Patient is here today for   Chief Complaint   Patient presents with    Hyperlipidemia    Peripheral Neuropathy     NERVE STUDY RESULTS        History of Present Illness  He is here today to follow-up on idiopathic peripheral polyneuropathy and hypercholesterolemia.    He had a nerve conduction study recently he did show abnormal studies indicating neuropathy in bilateral lower extremities however evaluation for etiology unrevealing.  He has an idiopathic process.  He feels that his pain is not sufficiently bad at this point to do anything about it.  He and I did have discussion about gabapentin and the fact that it may help his peripheral neuropathy but it also may make him fatigued.      Hyperlipidemia  Pertinent negatives include no chest pain or myalgias.   Peripheral Neuropathy  Symptoms include numbness.    Pertinent negative symptoms include no abdominal pain, no anorexia, no joint pain, no change in stool, no chest pain, no chills, no congestion, no cough, no diaphoresis, no fatigue, no fever, no headaches, no joint swelling, no myalgias, no nausea, no neck pain, no rash, no sore throat, no swollen glands, no dysuria, no vertigo, no visual change, no vomiting and no weakness.     Follow up for nerve conduction test.  Symptoms are: recurrent.   Onset was 6 to 12 months.   Symptoms occur: intermittently.  Symptoms include: numbness.   Pertinent negative symptoms include no abdominal pain, no anorexia, no joint pain, no change in stool, no chest pain, no chills, no congestion, no cough, no diaphoresis, no fatigue, no fever, no headaches, no joint swelling, no myalgias, no nausea, no neck pain, no rash, no sore throat, no swollen glands, no dysuria, no vertigo, no visual change, no vomiting and no  weakness.     History of Present Illness  The patient presents for evaluation of peripheral neuropathy.    He was diagnosed with peripheral neuropathy in his feet by Dr. Pinto last week, a condition he has been experiencing for approximately 6 to 8 months. He reports symptoms of numbness, tingling, and heat in his feet, which are more noticeable during periods of rest or after physical activity such as mowing the lawn. Despite these symptoms, he does not experience any associated pain. His B12 levels were tested and found to be within the normal range. He has a history of significant alcohol consumption but currently limits his intake to one glass of wine per week. He also reports past exposure to radioactive materials due to his work environment. He has undergone numerous CAT scans and x-rays for kidney-related issues, including a recent procedure in 11/2024 to remove a large kidney stone. He is scheduled for biannual follow-ups with his urologist. He has attempted to manage his neuropathy with compression socks, but these have not provided relief. He maintains an active lifestyle, including regular exercise and recreational activities such as basketball and fishing.    He is currently on medication for cholesterol management and metoprolol. He is scheduled for a blood test on 01/10/2025 and a checkup on 01/18/2025. He is under the care of Dr. Preciado, whom he sees every 6 months to a year. He had a consultation with Dr. Preciado in 09/2024. He is also due for a colonoscopy, having last undergone the procedure in 2018.    SOCIAL HISTORY  He drinks a glass of wine a week and occasionally beers, but not excessively.    MEDICATIONS  Current: Metoprolol, Cialis      Vitals:    12/18/24 0920   BP: 116/74   Pulse: 72   Resp: 16   Temp: 96.8 °F (36 °C)   SpO2: 96%     Body mass index is 24.49 kg/m².    Past Medical History:   Diagnosis Date    Cataract September, October 2022    Cataract sugery    Chest pain 04/06/2023     "came through [facility] ED..  followed up with  on 5/10/23. Coronary angiogram on 7/20 showed \"moderate to severe stenosis in the proximal LAD\" pt to start aspirin and statin.    Coronary artery disease     Disc disorder     Bulging    Enlarged prostate     History of exertional chest pain 05/2022    Hx of colonic polyp     Hyperlipidemia     Hypertension     Kidney stone 2007    Leukopenia     negative liver scan    Nephrolithiasis     Pneumonia 2022    SEPTIC    Renal calculi       Allergies   Allergen Reactions    Sulfa Antibiotics Swelling     Pt states \"my legs swelled up, fever, I just felt bad.\"      Social History     Socioeconomic History    Marital status:      Spouse name: Kimberly    Years of education: College   Tobacco Use    Smoking status: Never     Passive exposure: Never (mother smoked in the home)    Smokeless tobacco: Never    Tobacco comments:     No caffeine   Vaping Use    Vaping status: Never Used   Substance and Sexual Activity    Alcohol use: Yes     Alcohol/week: 4.0 standard drinks of alcohol     Types: 2 Glasses of wine, 2 Cans of beer per week     Comment: rarely    Drug use: No    Sexual activity: Not Currently     Partners: Female     Birth control/protection: Hysterectomy        Current Outpatient Medications:     aspirin 81 MG EC tablet, Take 1 tablet by mouth Daily., Disp: 90 tablet, Rfl: 3    atorvastatin (LIPITOR) 20 MG tablet, TAKE 1 TABLET BY MOUTH DAILY, Disp: 90 tablet, Rfl: 1    Cholecalciferol (VITAMIN D3) 2000 UNITS capsule, Take 1 capsule by mouth Daily., Disp: , Rfl:     Cyanocobalamin (B-12 PO), Take 1,000 mcg by mouth 1 (One) Time Per Week., Disp: , Rfl:     metoprolol tartrate (LOPRESSOR) 25 MG tablet, Take 1 tablet by mouth 2 (Two) Times a Day., Disp: 60 tablet, Rfl: 11    Omega-3 Fatty Acids (FISH OIL) 1200 MG capsule capsule, Take 1 capsule by mouth 2 (Two) Times a Day. Hold for surgery, Disp: , Rfl:     tadalafil (Cialis) 10 MG tablet, Take 1 tablet by " mouth Daily As Needed for Erectile Dysfunction., Disp: 30 tablet, Rfl: 0     Objective     Review of Systems   Constitutional:  Negative for chills, diaphoresis, fatigue and fever.   HENT:  Negative for congestion and sore throat.    Respiratory:  Negative for cough.    Cardiovascular:  Negative for chest pain.   Gastrointestinal:  Negative for abdominal pain, anorexia, nausea and vomiting.   Genitourinary:  Negative for dysuria.   Musculoskeletal:  Negative for joint pain, myalgias and neck pain.   Skin:  Negative for rash.   Neurological:  Positive for numbness. Negative for vertigo, weakness and headaches.       Physical Exam  Vitals and nursing note reviewed.   Constitutional:       General: He is not in acute distress.     Appearance: Normal appearance. He is not ill-appearing, toxic-appearing or diaphoretic.      Comments: Pleasant, neatly groomed, no distress.   Cardiovascular:      Rate and Rhythm: Regular rhythm.      Heart sounds: Normal heart sounds. No murmur heard.     No gallop.   Pulmonary:      Effort: No respiratory distress.      Breath sounds: Normal breath sounds. No wheezing or rales.   Neurological:      Mental Status: He is alert and oriented to person, place, and time.   Psychiatric:         Mood and Affect: Mood normal.         Behavior: Behavior normal.         Thought Content: Thought content normal.       Physical Exam  Lungs are clear.  Heart sounds are normal.    Results  Laboratory Studies  Hemoglobin A1c was normal. B12 levels were in the normal range. LDL cholesterol was 75 in June.    Assessment & Plan    Problems Addressed this Visit    None  Diagnoses    None.       Assessment & Plan  1. Idiopathic peripheral neuropathy.  The etiology of the neuropathy remains uncertain, with potential contributing factors including previous alcohol consumption and exposure to radioactive materials. However, these are not definitively linked to his condition. His B12 levels are within the normal  range, and he does not have diabetes or thyroid abnormalities. He was informed that idiopathic neuropathy is unlikely to worsen without treatment. Gabapentin was discussed as a potential treatment option for symptom management, but he declined due to concerns about fatigue. He was reassured that exercise would not exacerbate his condition.    2. Cholesterol management.  His LDL cholesterol level was 75 in June, which is above the desired target. He has been working on diet and exercise modifications. He is currently taking cholesterol medication prescribed by Dr. Salguero. He is due for a blood test on 01/10/2025, and a checkup on 01/18/2025. He will continue his current medication regimen and lifestyle modifications.    3. Health maintenance.  He is scheduled for a physical examination on 01/18/2025. He has not had a colonoscopy since 2018 and has paperwork to set up the procedure. He is advised to schedule the colonoscopy.    PROCEDURE  The patient underwent a procedure to remove a large kidney stone in 11/2023.      No follow-ups on file.    Patient or patient representative verbalized consent for the use of Ambient Listening during the visit with  Moose Chase MD for chart documentation. 12/20/2024  14:28 EST

## 2024-12-23 RX ORDER — ATORVASTATIN CALCIUM 20 MG/1
20 TABLET, FILM COATED ORAL DAILY
Qty: 90 TABLET | Refills: 1 | Status: SHIPPED | OUTPATIENT
Start: 2024-12-23

## 2025-01-20 ENCOUNTER — OFFICE VISIT (OUTPATIENT)
Dept: FAMILY MEDICINE CLINIC | Facility: CLINIC | Age: 70
End: 2025-01-20
Payer: MEDICARE

## 2025-01-20 VITALS
DIASTOLIC BLOOD PRESSURE: 78 MMHG | TEMPERATURE: 96.9 F | HEIGHT: 71 IN | OXYGEN SATURATION: 95 % | RESPIRATION RATE: 16 BRPM | HEART RATE: 69 BPM | WEIGHT: 170.6 LBS | SYSTOLIC BLOOD PRESSURE: 122 MMHG | BODY MASS INDEX: 23.88 KG/M2

## 2025-01-20 DIAGNOSIS — Z00.00 ANNUAL PHYSICAL EXAM: ICD-10-CM

## 2025-01-20 DIAGNOSIS — R00.2 PALPITATIONS: Primary | ICD-10-CM

## 2025-01-20 DIAGNOSIS — E78.00 HYPERCHOLESTEROLEMIA: ICD-10-CM

## 2025-01-20 DIAGNOSIS — R07.89 ATYPICAL CHEST PAIN: ICD-10-CM

## 2025-01-20 DIAGNOSIS — Z00.00 WELL ADULT EXAM: ICD-10-CM

## 2025-01-20 DIAGNOSIS — G62.9 NEUROPATHY: ICD-10-CM

## 2025-01-20 RX ORDER — CETIRIZINE HYDROCHLORIDE 5 MG/1
5 TABLET ORAL DAILY
COMMUNITY

## 2025-01-20 NOTE — PROGRESS NOTES
"Acacia Gresham is a 69 y.o. male. Patient is here today for   Chief Complaint   Patient presents with    Hyperlipidemia          Vitals:    01/20/25 0915   BP: 122/78   Pulse: 69   Resp: 16   Temp: 96.9 °F (36.1 °C)   SpO2: 95%     Body mass index is 23.8 kg/m².    The following portions of the patient's history were reviewed and updated as appropriate: allergies, current medications, past family history, past medical history, past social history, past surgical history and problem list.    Past Medical History:   Diagnosis Date    Cataract September, October 2022    Cataract sugery    Chest pain 04/06/2023    came through [facility] ED..  followed up with  on 5/10/23. Coronary angiogram on 7/20 showed \"moderate to severe stenosis in the proximal LAD\" pt to start aspirin and statin.    Coronary artery disease     Disc disorder     Bulging    Enlarged prostate     History of exertional chest pain 05/2022    Hx of colonic polyp     Hyperlipidemia     Hypertension     Kidney stone 2007    Leukopenia     negative liver scan    Nephrolithiasis     Pneumonia 2022    SEPTIC    Renal calculi       Allergies   Allergen Reactions    Sulfa Antibiotics Swelling     Pt states \"my legs swelled up, fever, I just felt bad.\"      Social History     Socioeconomic History    Marital status:      Spouse name: Kimberly    Years of education: College   Tobacco Use    Smoking status: Never     Passive exposure: Never (mother smoked in the home)    Smokeless tobacco: Never    Tobacco comments:     No caffeine   Vaping Use    Vaping status: Never Used   Substance and Sexual Activity    Alcohol use: Yes     Alcohol/week: 4.0 standard drinks of alcohol     Types: 2 Glasses of wine, 2 Cans of beer per week     Comment: rarely    Drug use: No    Sexual activity: Not Currently     Partners: Female     Birth control/protection: Hysterectomy        Current Outpatient Medications:     aspirin 81 MG EC tablet, Take 1 tablet " by mouth Daily., Disp: 90 tablet, Rfl: 3    atorvastatin (LIPITOR) 20 MG tablet, TAKE 1 TABLET BY MOUTH DAILY, Disp: 90 tablet, Rfl: 1    cetirizine (zyrTEC) 5 MG tablet, Take 1 tablet by mouth Daily., Disp: , Rfl:     Cholecalciferol (VITAMIN D3) 2000 UNITS capsule, Take 1 capsule by mouth Daily., Disp: , Rfl:     Cyanocobalamin (B-12 PO), Take 1,000 mcg by mouth 1 (One) Time Per Week., Disp: , Rfl:     metoprolol tartrate (LOPRESSOR) 25 MG tablet, Take 1 tablet by mouth 2 (Two) Times a Day., Disp: 60 tablet, Rfl: 11    Omega-3 Fatty Acids (FISH OIL) 1200 MG capsule capsule, Take 1 capsule by mouth 2 (Two) Times a Day. Hold for surgery, Disp: , Rfl:     tadalafil (Cialis) 10 MG tablet, Take 1 tablet by mouth Daily As Needed for Erectile Dysfunction., Disp: 30 tablet, Rfl: 0     EKG Procedures    ECG Report    Objective   Terrance Gresham 69 y.o. male who presents for an Annual physical exam.   Labs results discussed in detail with the patient.  Plan to update vaccines if needed today.  History of Present Illness  Here today for an annual physical.    He [facility] with [facility].  He had a normal Zio patch this past July.  This turned out to be a normal study.  He complained of chest pressure and pain with rapid heartbeats at that time and he continues to have the symptoms.  When he is exerting himself on his exercise equipment at home he notes that he occasionally achieves a heart rate of 150 or greater and develops chest pressure occasionally with radiation to the left shoulder.  In a couple minutes after it starts it is gone..  Hyperlipidemia  Pertinent negatives include no chest pain or myalgias.     Physical  Symptoms are: recurrent.   Onset was 6 to 12 months.   Symptoms occur: daily.  Symptoms include: numbness.   Pertinent negative symptoms include no abdominal pain, no anorexia, no joint pain, no change in stool, no chest pain, no chills, no congestion, no cough, no diaphoresis, no fatigue, no  fever, no headaches, no joint swelling, no myalgias, no nausea, no neck pain, no rash, no sore throat, no swollen glands, no dysuria, no vertigo, no visual change, no vomiting and no weakness.   Treatment and/or Medications comments include: none   Additional information: feet  Answers submitted by the patient for this visit:  Primary Reason for Visit (Submitted on 1/15/2025)  What is the primary reason for your visit?: Problem Not Listed    History of Present Illness  The patient is a 69-year-old male who presents for evaluation of chest pain.    He reports experiencing chest discomfort, described as heaviness and occasional sharp pain, particularly during physical exertion such as cycling. He has been under significant stress due to his wife's health issues, which he believes may be contributing to his symptoms. He has not consulted a cardiologist recently but has done so in the past. His last consultation with Dr. Winkler was in 09/2024, at which time he was asymptomatic. He has undergone a heart catheterization a few years ago, neck scans, and a stress test, all of which yielded normal results. He maintains an active lifestyle, engaging in aerobic exercise for approximately 1.5 hours daily, but has reduced this to 30 minutes due to his elevated heart rate. He also engages in snow shoveling, which does not significantly elevate his heart rate. He monitors his blood pressure daily, which consistently reads below 120/80. He has lost weight, currently weighing 165 pounds at home. He is due for a colonoscopy, having last undergone the procedure in 12/2018. He has a history of colon polyps, which were not present during his last colonoscopy. This discomfort is accompanied by an elevated heart rate, reaching up to 150 beats per minute, which subsides to around 100 beats per minute upon cessation of activity. The chest discomfort occasionally radiates to his shoulder and is associated with nausea and perspiration. These  episodes typically last between 2 to 3 minutes. He is currently on metoprolol 25 mg twice daily. He has previously worn a ZIO patch but did not receive the results.    MEDICATIONS  Current: metoprolol    Health Habits:  Dental Exam. up to date  Eye Exam. up to date  Exercise: 5 times/week.  Current exercise activities include: cardiovascular workout on exercise equipment    Preventative counseling  Discussed face to face the importance of healthy diet and exercise.     Lab Results (most recent)       None              Review of Systems   Constitutional:  Negative for chills, diaphoresis, fatigue and fever.   HENT:  Negative for congestion and sore throat.    Respiratory:  Negative for cough.    Cardiovascular:  Negative for chest pain.   Gastrointestinal:  Negative for abdominal pain, anorexia, nausea and vomiting.   Genitourinary:  Negative for dysuria.   Musculoskeletal:  Negative for joint pain, myalgias and neck pain.   Skin:  Negative for rash.   Neurological:  Positive for numbness. Negative for vertigo, weakness and headaches.       Physical Exam  Vitals and nursing note reviewed.   Constitutional:       General: He is not in acute distress.     Appearance: Normal appearance. He is normal weight. He is not ill-appearing, toxic-appearing or diaphoretic.      Comments: Pleasant, neatly groomed, no distress.   HENT:      Head: Normocephalic and atraumatic.      Right Ear: Tympanic membrane, ear canal and external ear normal. There is no impacted cerumen.      Left Ear: Tympanic membrane, ear canal and external ear normal. There is no impacted cerumen.      Nose: Nose normal.      Mouth/Throat:      Mouth: Mucous membranes are moist.      Pharynx: No oropharyngeal exudate or posterior oropharyngeal erythema.   Eyes:      General: No scleral icterus.        Right eye: No discharge.         Left eye: No discharge.      Extraocular Movements: Extraocular movements intact.      Conjunctiva/sclera: Conjunctivae  normal.   Neck:      Vascular: No carotid bruit.   Cardiovascular:      Rate and Rhythm: Normal rate and regular rhythm.      Heart sounds: Normal heart sounds. No murmur heard.     No gallop.   Pulmonary:      Effort: Pulmonary effort is normal. No respiratory distress.      Breath sounds: Normal breath sounds. No stridor. No wheezing, rhonchi or rales.   Chest:      Chest wall: No tenderness.   Abdominal:      General: Abdomen is flat. There is no distension.      Palpations: Abdomen is soft. There is no mass.      Tenderness: There is no abdominal tenderness. There is no guarding or rebound.      Hernia: No hernia is present.   Genitourinary:     Comments: Deferred  Musculoskeletal:         General: Normal range of motion.      Cervical back: Normal range of motion and neck supple.   Lymphadenopathy:      Cervical: No cervical adenopathy.   Skin:     General: Skin is warm and dry.      Coloration: Skin is not jaundiced or pale.      Findings: No bruising, erythema, lesion or rash.   Neurological:      General: No focal deficit present.      Mental Status: He is alert and oriented to person, place, and time.      Motor: No weakness.      Coordination: Coordination normal.      Gait: Gait normal.      Deep Tendon Reflexes: Reflexes normal.      Comments: He has chronic bilateral lower extremity peripheral polyneuropathy.   Psychiatric:         Mood and Affect: Mood normal.         Behavior: Behavior normal.         Thought Content: Thought content normal.         Judgment: Judgment normal.       Physical Exam  Vital Signs  Weight is 170.    Results  Laboratory Studies  Cholesterol levels are good. Comprehensive metabolic panel is normal. PSA is low.    Testing  ZIO patch monitor study showed average heart rate 72, minimum 43, maximum 162. No correlations found with dizziness and chest pain reported during monitoring.    Assessment & Plan      Problems Addressed this Visit          Cardiac and Vasculature     Hypercholesterolemia       Health Encounters    Well adult exam    Annual physical exam       Neuro    Neuropathy       Symptoms and Signs    Atypical chest pain     Other Visit Diagnoses       Palpitations    -  Primary    Relevant Orders    Ambulatory Referral to Cardiology          Diagnoses         Codes Comments    Palpitations    -  Primary ICD-10-CM: R00.2  ICD-9-CM: 785.1     Hypercholesterolemia     ICD-10-CM: E78.00  ICD-9-CM: 272.0     Well adult exam     ICD-10-CM: Z00.00  ICD-9-CM: V70.0     Annual physical exam     ICD-10-CM: Z00.00  ICD-9-CM: V70.0     Neuropathy     ICD-10-CM: G62.9  ICD-9-CM: 355.9     Atypical chest pain     ICD-10-CM: R07.89  ICD-9-CM: 786.59           Assessment & Plan  1. Chest pain.  His symptoms are reminiscent of those experienced in 07/2024, with no discernible abnormalities on the monitor at that time. His blood pressure readings are within normal limits. Given his history of nonobstructive coronary artery disease, it is unlikely that this is the cause of his current symptoms. His lab work, including cholesterol levels and comprehensive metabolic panel, are unremarkable. He has experienced a weight loss of 4 pounds since 10/2024. His PSA levels are also within normal range. He has a personal history of colon polyps, with the last occurrence being more than 5 years ago. The dosage of metoprolol will be increased to 50 mg twice daily to potentially alleviate his symptoms. A referral to Dr. Winkler's office will be made for further evaluation. He is advised to monitor his blood pressure and report any instances of lightheadedness or fainting, which may necessitate a reduction in the metoprolol dosage back to 25 mg twice daily.    Follow-up  The patient will follow up in 1 month.    PROCEDURE  The patient underwent a heart catheterization a few years ago, which yielded normal results.      There are no Patient Instructions on file for this visit.    No follow-ups on  file.  Patient was given instructions and counseling regarding his  condition for health maintenance advice.  Please see specific information pulled into the AVS if appropriate.     Patient or patient representative verbalized consent for the use of Ambient Listening during the visit with  Moose Chase MD for chart documentation. 1/20/2025  12:44 EST

## 2025-01-24 ENCOUNTER — TELEPHONE (OUTPATIENT)
Dept: CARDIOLOGY | Facility: CLINIC | Age: 70
End: 2025-01-24

## 2025-01-24 RX ORDER — METOPROLOL TARTRATE 50 MG
50 TABLET ORAL 2 TIMES DAILY
Qty: 60 TABLET | Refills: 11 | Status: SHIPPED | OUTPATIENT
Start: 2025-01-24

## 2025-01-27 NOTE — PROGRESS NOTES
Date of Office Visit: 2025  Encounter Provider: JUANCHO Villar  Place of Service: [facility] CARDIOLOGY  Patient Name: Terrance Gresham  :1955    Chief complaint  Follow-up medication changes    History of Present Illness  Patient is a 69 y.o. year old male  patient of Dr. Amato. Past medical history includes hypertension, hyperlipidemia, GERD, nonobstructive coronary artery disease, kidney stones, anxiety and tachycardia.     He has family history of early chronic coronary artery disease with his father having bypass in his late 50s. He was previously followed by Dr. Keegan Reid. In 2017 he had echocardiogram showing normal left ventricular systolic function, mild concentric hypertrophy, mildly dilated right ventricular cavity, mild to moderately dilated left atrial cavity and no significant valve disease.  Perfusion stress test was normal.  He had a normal bilateral carotid artery duplex at that time as well    He reported exertional chest discomfort while riding his bike.  He had a coronary CTA 2023 which showed normal coronary anatomy with evidence of atherosclerotic disease, moderate multivessel coronary artery disease with possibly severe distal RCA lesion.  Calcium score was 912.  RV was mildly enlarged with slight wall motion abnormality as well.  He had invasive coronary angiogram 2023 which showed mild single-vessel coronary artery disease with a 20-30% proximal LAD lesion as well as a mid LAD segment stenosis with otherwise luminal irregularities.  He ultimately was treated with aspirin, atorvastatin and started on metoprolol tartrate.    He had COVID in 2024.     He had an ER visit 2024.  He reported some exertional chest pain and heart rate as high as 170 while riding stationary bike.  High-sensitivity troponin was normal x 2.  Magnesium was normal.  Potassium was normal.  Chloride was slightly low as well as sodium was  "slightly low.  He reportedly had received Tdap and Prevnar immunization 7/1/2024 and felt weak since then.  Chest x-ray showed no acute process.  He was discharged with 1 week external mobile telemetry which was overall normal with very short bursts of atrial tachycardia.    On 1/24/2025 he saw PCP with complaints of exertional chest pain.  Metoprolol was increased to 50 mg twice daily.  This appointment was made for evaluation.  I will visit with him for the first time today and have reviewed prior records.  In office today blood pressure is low normal at 108/64 and heart rate is 49 bpm.  He notes that at the time he had chest pain his wife was in the hospital and very ill and he was under a significant amount of increased stress.  His wife is now home and doing much better and he is back to riding his bike with no exertional symptoms.  Chest pain has significantly improved.    Past Medical History:   Diagnosis Date    Cataract September, October 2022    Cataract sugery    Chest pain 04/06/2023    came through [facility] ED..  followed up with  on 5/10/23. Coronary angiogram on 7/20 showed \"moderate to severe stenosis in the proximal LAD\" pt to start aspirin and statin.    Coronary artery disease     Disc disorder     Bulging    Enlarged prostate     History of exertional chest pain 05/2022    Hx of colonic polyp     Hyperlipidemia     Hypertension     Kidney stone 2007    Leukopenia     negative liver scan    Nephrolithiasis     Pneumonia 2022    SEPTIC    Renal calculi      Past Surgical History:   Procedure Laterality Date    CARDIAC CATHETERIZATION N/A 8/1/2023    Procedure: Left Heart Cath;  Surgeon: Ariel Landeros MD;  Location: [facility] CATH INVASIVE LOCATION;  Service: Cardiovascular;  Laterality: N/A;    CARDIAC CATHETERIZATION N/A 8/1/2023    Procedure: Coronary angiography;  Surgeon: Ariel Landeros MD;  Location: [facility] CATH INVASIVE LOCATION;  Service: Cardiovascular;  Laterality: N/A;    " CATARACT EXTRACTION W/ INTRAOCULAR LENS IMPLANT Bilateral 2022    COLONOSCOPY N/A 12/10/2018    Procedure: COLONOSCOPY to cecuman ti;  Surgeon: Jose Montgomery MD;  Location: [facility] ENDOSCOPY;  Service: Gastroenterology    COLONOSCOPY W/ POLYPECTOMY      CYST REMOVAL Right 2012    wrist     CYSTOSCOPY      CYSTOSCOPY W/ LASER LITHOTRIPSY      EXTRACORPOREAL SHOCK WAVE LITHOTRIPSY (ESWL) Left 7/5/2023    Procedure: LEFT  SHOCKWAVE LITHOTRIPSY;  Surgeon: Abel Pinto MD;  Location: [facility] MAIN OR;  Service: Urology;  Laterality: Left;    KNEE ARTHROSCOPY Right 2015    KNEE SURGERY Right 01/13/2016     Outpatient Medications Prior to Visit   Medication Sig Dispense Refill    aspirin 81 MG EC tablet Take 1 tablet by mouth Daily. 90 tablet 3    atorvastatin (LIPITOR) 20 MG tablet TAKE 1 TABLET BY MOUTH DAILY 90 tablet 1    Cholecalciferol (VITAMIN D3) 2000 UNITS capsule Take 1 capsule by mouth Daily.      Cyanocobalamin (B-12 PO) Take 1,000 mcg by mouth 1 (One) Time Per Week.      Omega-3 Fatty Acids (FISH OIL) 1200 MG capsule capsule Take 1 capsule by mouth 2 (Two) Times a Day. Hold for surgery      tadalafil (Cialis) 10 MG tablet Take 1 tablet by mouth Daily As Needed for Erectile Dysfunction. 30 tablet 0    metoprolol tartrate (LOPRESSOR) 50 MG tablet Take 1 tablet by mouth 2 (Two) Times a Day. 60 tablet 11    cetirizine (zyrTEC) 5 MG tablet Take 1 tablet by mouth Daily.       No facility-administered medications prior to visit.       Allergies as of 01/29/2025 - Reviewed 01/29/2025   Allergen Reaction Noted    Sulfa antibiotics Swelling 07/01/2024     Social History     Socioeconomic History    Marital status:      Spouse name: Kimberly    Years of education: College   Tobacco Use    Smoking status: Never     Passive exposure: Never (mother smoked in the home)    Smokeless tobacco: Never    Tobacco comments:     No caffeine   Vaping Use    Vaping status: Never Used   Substance and Sexual Activity     "Alcohol use: Yes     Alcohol/week: 4.0 standard drinks of alcohol     Types: 2 Glasses of wine, 2 Cans of beer per week     Comment: rarely    Drug use: No    Sexual activity: Not Currently     Partners: Female     Birth control/protection: Hysterectomy     Family History   Problem Relation Age of Onset    Stomach cancer Mother     Stroke Mother     Breast cancer Mother     Lung cancer Mother     Prostate cancer Father     Alzheimer's disease Father     Kidney disease Father     Heart disease Father         Triple bypass    Hypertension Father     No Known Problems Sister     Prostate cancer Brother 60    Obesity Brother     Sleep apnea Brother     Other Maternal Grandmother         Circulatory /heart problems    Heart attack Maternal Grandfather     Cancer Paternal Grandfather     Malig Hyperthermia Neg Hx      Review of Systems   Constitutional: Negative for malaise/fatigue.   Cardiovascular:  Positive for chest pain. Negative for claudication, dyspnea on exertion, leg swelling, near-syncope, orthopnea, palpitations, paroxysmal nocturnal dyspnea and syncope.   Respiratory:  Negative for shortness of breath.    Neurological:  Negative for brief paralysis, dizziness, headaches and light-headedness.   All other systems reviewed and are negative.       Objective:     Vitals:    01/29/25 0835   BP: 108/64   Pulse: (!) 49   Weight: 70.8 kg (156 lb)   Height: 177.8 cm (70\")     Body mass index is 22.38 kg/m².    Vitals reviewed.   Constitutional:       General: Not in acute distress.     Appearance: Well-developed and not in distress. Not diaphoretic.   HENT:      Head: Normocephalic.   Pulmonary:      Effort: Pulmonary effort is normal. No respiratory distress.      Breath sounds: Normal breath sounds. No wheezing. No rhonchi. No rales.   Cardiovascular:      Bradycardia present. Regular rhythm.      Murmurs: There is no murmur.   Pulses:     Radial: 2+ bilaterally.  Edema:     Peripheral edema absent.   Skin:     " General: Skin is warm and dry. There is no cyanosis.      Findings: No rash.   Neurological:      Mental Status: Alert and oriented to person, place, and time.   Psychiatric:         Behavior: Behavior normal. Behavior is cooperative.         Thought Content: Thought content normal.         Judgment: Judgment normal.       Lab Review:     Lab Results   Component Value Date     01/10/2025     (L) 11/22/2024    K 4.5 01/10/2025    K 4.1 11/22/2024     01/10/2025    CL 99 11/22/2024    CO2 29.6 (H) 01/10/2025    CO2 27.8 11/22/2024    BUN 14 01/10/2025    BUN 9 11/22/2024    CREATININE 1.06 01/10/2025    CREATININE 1.00 11/22/2024    EGFRIFNONA 80 01/12/2022    EGFRIFNONA 73 07/26/2021    EGFRIFAFRI 92 01/12/2022    EGFRIFAFRI 88 07/26/2021    GLUCOSE 95 01/10/2025    GLUCOSE 93 11/22/2024    CALCIUM 9.8 01/10/2025    CALCIUM 8.9 11/22/2024    PROTENTOTREF 6.6 01/10/2025    PROTENTOTREF 6.6 10/09/2024    ALBUMIN 4.6 01/10/2025    ALBUMIN 4.0 11/22/2024    BILITOT 2.3 (H) 01/10/2025    BILITOT 1.5 (H) 11/22/2024    AST 33 01/10/2025    AST 20 11/22/2024    ALT 22 01/10/2025    ALT 14 11/22/2024     Lab Results   Component Value Date    WBC 3.88 01/10/2025    WBC 3.26 (L) 11/22/2024    HGB 15.9 01/10/2025    HGB 15.5 11/22/2024    HCT 45.9 01/10/2025    HCT 45.4 11/22/2024    MCV 95.0 01/10/2025    MCV 97.8 (H) 11/22/2024     01/10/2025     (L) 11/22/2024     Lab Results   Component Value Date    PROBNP 334.0 04/29/2022     Lab Results   Component Value Date    TROPONINT 11 07/06/2024     Lab Results   Component Value Date    TSH 2.000 10/09/2024    TSH 2.900 12/11/2023      Lipid Panel          6/26/2024    10:30 1/10/2025    08:41   Lipid Panel   Total Cholesterol 163  139    Triglycerides 78  57    HDL Cholesterol 73  72    VLDL Cholesterol 15  12    LDL Cholesterol  75  55    LDL/HDL Ratio 1.02            ECG 12 Lead    Date/Time: 1/29/2025 8:44 AM  Performed by: Maryam Liu  JUANCHO    Authorized by: Maryam Liu APRN  Comparison: compared with previous ECG   Similar to previous ECG  Rhythm: sinus bradycardia  Rate: bradycardic  BPM: 49  QRS axis: normal  Other findings: low voltage and poor R wave progression  Comments: Similar to prior, though heart rate decreased        Assessment:       Diagnosis Plan   1. Coronary artery disease involving native coronary artery of native heart without angina pectoris        2. Family history of coronary artery disease        3. Tachycardia        4. Atypical chest pain        5. Hypercholesterolemia          Plan:       Nonobstructive coronary artery disease -continue aspirin and statin.  Metoprolol increased 5 days ago for chest pain. Pain improved but was associated with increased stress, which has also improved. No ischemic changes on EKG today.   Palpitations. None, though bradycardic on current metoprolol dose. Will decrease dose slightly to 25 mg QAM and 50 mg QPM.  Elevated blood pressure without diagnosis of hypertension -controlled today in clinic and actually low normal on increased metoprolol dose. Continue sodium restricted diet.  Family history coronary artery disease   Mixed hyperlipidemia -At goal. Continue diet and lifestyle modifications.      Time Spent: I spent 30 minutes caring for Terrance on this date of service. This time includes time spent by me in the following activities: preparing for the visit, reviewing tests, performing a medically appropriate examination and/or evaluations, counseling and educating the patient/family/caregiver, ordering medications, tests, or procedures, documenting information in the medical record, and independently interpreting results and communicating that information with the patient/family/caregiver.   I spent 1 minutes on the separately reported service of ECG. This time is not included in the time used to support the E/M service also reported today.        Your medication list             Accurate as of January 29, 2025  9:02 AM. If you have any questions, ask your nurse or doctor.                CHANGE how you take these medications        Instructions Last Dose Given Next Dose Due   metoprolol tartrate 25 MG tablet  Commonly known as: LOPRESSOR  What changed:   medication strength  how much to take  how to take this  when to take this  additional instructions  Changed by: Maryam Liu      Take 1 tablet (25 mg) QAM and 2 tablets (50 mg) QPM              CONTINUE taking these medications        Instructions Last Dose Given Next Dose Due   aspirin 81 MG EC tablet      Take 1 tablet by mouth Daily.       atorvastatin 20 MG tablet  Commonly known as: LIPITOR      TAKE 1 TABLET BY MOUTH DAILY       B-12 PO      Take 1,000 mcg by mouth 1 (One) Time Per Week.       fish oil 1200 MG capsule capsule      Take 1 capsule by mouth 2 (Two) Times a Day. Hold for surgery       tadalafil 10 MG tablet  Commonly known as: Cialis      Take 1 tablet by mouth Daily As Needed for Erectile Dysfunction.       Vitamin D3 50 MCG (2000 UT) capsule      Take 1 capsule by mouth Daily.                 Where to Get Your Medications        These medications were sent to [id number] - [place], [place] - [facility] [facility] AT [facility] - [phone]  - [phone]   [address], [facility] [address]      Phone: [phone]   metoprolol tartrate 25 MG tablet         Patient is no longer taking -.  I corrected the med list to reflect this.  I did not stop these medications.    Return for Keep September appointment.      Dictated utilizing Dragon dictation

## 2025-01-29 ENCOUNTER — OFFICE VISIT (OUTPATIENT)
Age: 70
End: 2025-01-29
Payer: MEDICARE

## 2025-01-29 VITALS
WEIGHT: 156 LBS | BODY MASS INDEX: 22.33 KG/M2 | HEART RATE: 49 BPM | SYSTOLIC BLOOD PRESSURE: 108 MMHG | HEIGHT: 70 IN | DIASTOLIC BLOOD PRESSURE: 64 MMHG

## 2025-01-29 DIAGNOSIS — E78.00 HYPERCHOLESTEROLEMIA: ICD-10-CM

## 2025-01-29 DIAGNOSIS — Z82.49 FAMILY HISTORY OF CORONARY ARTERY DISEASE: ICD-10-CM

## 2025-01-29 DIAGNOSIS — I25.10 CORONARY ARTERY DISEASE INVOLVING NATIVE CORONARY ARTERY OF NATIVE HEART WITHOUT ANGINA PECTORIS: Primary | ICD-10-CM

## 2025-01-29 DIAGNOSIS — R07.89 ATYPICAL CHEST PAIN: ICD-10-CM

## 2025-01-29 DIAGNOSIS — R00.0 TACHYCARDIA: ICD-10-CM

## 2025-01-29 RX ORDER — METOPROLOL TARTRATE 25 MG/1
TABLET, FILM COATED ORAL
Qty: 135 TABLET | Refills: 3 | Status: SHIPPED | OUTPATIENT
Start: 2025-01-29

## 2025-01-29 RX ORDER — METOPROLOL TARTRATE 50 MG
TABLET ORAL
Qty: 135 TABLET | Refills: 3 | Status: SHIPPED | OUTPATIENT
Start: 2025-01-29 | End: 2025-01-29

## 2025-02-19 ENCOUNTER — OFFICE VISIT (OUTPATIENT)
Dept: FAMILY MEDICINE CLINIC | Facility: CLINIC | Age: 70
End: 2025-02-19
Payer: MEDICARE

## 2025-02-19 VITALS
DIASTOLIC BLOOD PRESSURE: 78 MMHG | WEIGHT: 174.1 LBS | BODY MASS INDEX: 24.92 KG/M2 | TEMPERATURE: 96.7 F | HEART RATE: 57 BPM | OXYGEN SATURATION: 98 % | HEIGHT: 70 IN | SYSTOLIC BLOOD PRESSURE: 120 MMHG | RESPIRATION RATE: 15 BRPM

## 2025-02-19 DIAGNOSIS — I25.119 ATHEROSCLEROSIS OF NATIVE CORONARY ARTERY OF NATIVE HEART WITH ANGINA PECTORIS: Primary | ICD-10-CM

## 2025-02-19 DIAGNOSIS — I95.2 HYPOTENSION DUE TO DRUGS: ICD-10-CM

## 2025-02-19 PROCEDURE — 1126F AMNT PAIN NOTED NONE PRSNT: CPT | Performed by: INTERNAL MEDICINE

## 2025-02-19 PROCEDURE — 99213 OFFICE O/P EST LOW 20 MIN: CPT | Performed by: INTERNAL MEDICINE

## 2025-02-19 NOTE — PROGRESS NOTES
"Acacia Gresham is a 69 y.o. male. Patient is here today for   Chief Complaint   Patient presents with    Hyperlipidemia        Hyperlipidemia  Pertinent negatives include no chest pain or myalgias.     Onset was at an unknown time.   Pertinent negative symptoms include no abdominal pain, no anorexia, no joint pain, no change in stool, no chest pain, no chills, no congestion, no cough, no diaphoresis, no fatigue, no fever, no headaches, no joint swelling, no myalgias, no nausea, no neck pain, no numbness, no rash, no sore throat, no swollen glands, no dysuria, no vertigo, no visual change, no vomiting and no weakness.   Treatment and/or Medications comments include: metoprolol tartrate   Additional information: Follow up for chest pain    History of Present Illness  The patient presents for evaluation of chest pain.    He reports a resolution of his previously experienced chest pain. He describes the pain as mild and intermittent, occurring in various locations. He also notes that his blood pressure readings have been consistently low, with morning measurements typically around 107/60.    MEDICATIONS  metoprolol      Vitals:    02/19/25 0830   BP: 120/78   Pulse: 57   Resp: 15   Temp: 96.7 °F (35.9 °C)   SpO2: 98%     Body mass index is 24.98 kg/m².    Past Medical History:   Diagnosis Date    Cataract September, October 2022    Cataract sugery    Chest pain 04/06/2023    came through [facility] ED..  followed up with  on 5/10/23. Coronary angiogram on 7/20 showed \"moderate to severe stenosis in the proximal LAD\" pt to start aspirin and statin.    Coronary artery disease     Disc disorder     Bulging    Enlarged prostate     History of exertional chest pain 05/2022    Hx of colonic polyp     Hyperlipidemia     Hypertension     Kidney stone 2007    Leukopenia     negative liver scan    Nephrolithiasis     Pneumonia 2022    SEPTIC    Renal calculi       Allergies   Allergen Reactions    Sulfa " "Antibiotics Swelling     Pt states \"my legs swelled up, fever, I just felt bad.\"      Social History     Socioeconomic History    Marital status:      Spouse name: Kimberly    Years of education: College   Tobacco Use    Smoking status: Never     Passive exposure: Never (mother smoked in the home)    Smokeless tobacco: Never    Tobacco comments:     No caffeine   Vaping Use    Vaping status: Never Used   Substance and Sexual Activity    Alcohol use: Yes     Alcohol/week: 4.0 standard drinks of alcohol     Types: 2 Glasses of wine, 2 Cans of beer per week     Comment: rarely    Drug use: No    Sexual activity: Not Currently     Partners: Female     Birth control/protection: Hysterectomy        Current Outpatient Medications:     aspirin 81 MG EC tablet, Take 1 tablet by mouth Daily., Disp: 90 tablet, Rfl: 3    atorvastatin (LIPITOR) 20 MG tablet, TAKE 1 TABLET BY MOUTH DAILY, Disp: 90 tablet, Rfl: 1    Cholecalciferol (VITAMIN D3) 2000 UNITS capsule, Take 1 capsule by mouth Daily., Disp: , Rfl:     Cyanocobalamin (B-12 PO), Take 1,000 mcg by mouth 1 (One) Time Per Week., Disp: , Rfl:     metoprolol tartrate (LOPRESSOR) 25 MG tablet, Take 1 tablet (25 mg) QAM and 2 tablets (50 mg) QPM, Disp: 135 tablet, Rfl: 3    Omega-3 Fatty Acids (FISH OIL) 1200 MG capsule capsule, Take 1 capsule by mouth 2 (Two) Times a Day. Hold for surgery, Disp: , Rfl:     tadalafil (Cialis) 10 MG tablet, Take 1 tablet by mouth Daily As Needed for Erectile Dysfunction., Disp: 30 tablet, Rfl: 0     Objective     Review of Systems   Constitutional:  Negative for chills, diaphoresis, fatigue and fever.   HENT:  Negative for congestion and sore throat.    Respiratory:  Negative for cough.    Cardiovascular:  Negative for chest pain.   Gastrointestinal:  Negative for abdominal pain, anorexia, nausea and vomiting.   Genitourinary:  Negative for dysuria.   Musculoskeletal:  Negative for joint pain, myalgias and neck pain.   Skin:  Negative for rash. "   Neurological:  Negative for vertigo, weakness, numbness and headaches.       Physical Exam  Vitals and nursing note reviewed.   Constitutional:       General: He is not in acute distress.     Appearance: Normal appearance. He is not ill-appearing, toxic-appearing or diaphoretic.   Cardiovascular:      Rate and Rhythm: Regular rhythm. Tachycardia and bradycardia present.   Neurological:      Mental Status: He is alert and oriented to person, place, and time.   Psychiatric:         Mood and Affect: Mood normal.         Behavior: Behavior normal.         Thought Content: Thought content normal.       Physical Exam      Results  Testing  EKG shows no ischemic changes.    Assessment & Plan    Problems Addressed this Visit          Cardiac and Vasculature    Atherosclerosis of native coronary artery of native heart with angina pectoris - Primary    Hypotension due to drugs     Diagnoses         Codes Comments    Atherosclerosis of native coronary artery of native heart with angina pectoris    -  Primary ICD-10-CM: I25.119  ICD-9-CM: 414.01, 413.9     Hypotension due to drugs     ICD-10-CM: I95.2  ICD-9-CM: 458.8, E947.9           Assessment & Plan  1. Chest pain.  The patient's chest pain has improved and is likely related to increased stress rather than cardiac issues. No ischemic changes were noted on the EKG. His heart rate is slightly slow, but his blood pressure is within normal limits. The metoprolol dosage will be adjusted to 25 mg twice daily. If symptoms recur, the dosage will be reverted to 25 mg in the morning and 50 mg at night.      No follow-ups on file.    Patient or patient representative verbalized consent for the use of Ambient Listening during the visit with  Moose Chase MD for chart documentation. 2/19/2025  09:06 EST

## 2025-05-22 ENCOUNTER — OFFICE VISIT (OUTPATIENT)
Dept: FAMILY MEDICINE CLINIC | Facility: CLINIC | Age: 70
End: 2025-05-22
Payer: MEDICARE

## 2025-05-22 VITALS
SYSTOLIC BLOOD PRESSURE: 98 MMHG | TEMPERATURE: 98 F | WEIGHT: 174.8 LBS | HEART RATE: 59 BPM | DIASTOLIC BLOOD PRESSURE: 68 MMHG | BODY MASS INDEX: 25.03 KG/M2 | HEIGHT: 70 IN | RESPIRATION RATE: 16 BRPM | OXYGEN SATURATION: 91 %

## 2025-05-22 DIAGNOSIS — M54.50 LUMBAR SPINE PAIN: Primary | ICD-10-CM

## 2025-05-22 RX ORDER — CYCLOBENZAPRINE HCL 10 MG
10 TABLET ORAL 3 TIMES DAILY PRN
Qty: 30 TABLET | Refills: 1 | Status: SHIPPED | OUTPATIENT
Start: 2025-05-22

## 2025-05-23 ENCOUNTER — HOSPITAL ENCOUNTER (OUTPATIENT)
Dept: PHYSICAL THERAPY | Facility: HOSPITAL | Age: 70
Setting detail: THERAPIES SERIES
Discharge: HOME | End: 2025-05-23
Payer: MEDICARE

## 2025-05-23 DIAGNOSIS — M54.50 LUMBAR SPINE PAIN: ICD-10-CM

## 2025-05-23 DIAGNOSIS — M54.50 LOW BACK PAIN, UNSPECIFIED BACK PAIN LATERALITY, UNSPECIFIED CHRONICITY, UNSPECIFIED WHETHER SCIATICA PRESENT: Primary | ICD-10-CM

## 2025-05-23 PROCEDURE — 97110 THERAPEUTIC EXERCISES: CPT

## 2025-05-23 PROCEDURE — 97161 PT EVAL LOW COMPLEX 20 MIN: CPT

## 2025-05-23 RX ORDER — SCOPOLAMINE 1 MG/3D
1 PATCH, EXTENDED RELEASE TRANSDERMAL
Qty: 4 EACH | Refills: 0 | Status: SHIPPED | OUTPATIENT
Start: 2025-05-23

## 2025-05-23 NOTE — PROGRESS NOTES
"Acacia Gresham is a 69 y.o. male. Patient is here today for   Chief Complaint   Patient presents with    Back Pain     Left side         Back Pain    History of Present Illness  The patient presents for evaluation of back pain.    He reports an incident of lifting a washer and dryer on 04/01/2025, which he believes may have triggered his current back pain. The onset of the pain was approximately 2 weeks post-incident. Initially suspecting a kidney stone, he consulted a urologist last week. The urologist identified some particles on the left side, which were subsequently treated with lithotripsy. An x-ray and urine sample were obtained, revealing no hematuria or infection. He describes the pain as being located in the upper back, without any radiation down the leg. He also reports difficulty in getting out of bed in the morning due to the pain, and experiences discomfort when bending over or twisting. He has attempted to manage the pain with Aleve, but this resulted in stomach upset. He has previously experienced similar back pain, which was managed with physical therapy. He is scheduled for a blood test and physical examination in 08/2025.    He is planning a vacation in 06/2025, which will include deep-sea fishing, and is seeking advice on how to prevent seasickness.    FAMILY HISTORY  His brother has sciatica.      Vitals:    05/22/25 0753   BP: 98/68   Pulse: 59   Resp: 16   Temp: 98 °F (36.7 °C)   SpO2: 91%     Body mass index is 25.08 kg/m².    Past Medical History:   Diagnosis Date    Cataract September, October 2022    Cataract sugery    Chest pain 04/06/2023    came through [facility] ED..  followed up with  on 5/10/23. Coronary angiogram on 7/20 showed \"moderate to severe stenosis in the proximal LAD\" pt to start aspirin and statin.    Coronary artery disease     Disc disorder     Bulging    Enlarged prostate     History of exertional chest pain 05/2022    Hx of colonic polyp     " "Hyperlipidemia     Hypertension     Kidney stone 2007    Leukopenia     negative liver scan    Nephrolithiasis     Pneumonia 2022    SEPTIC    Renal calculi       Allergies   Allergen Reactions    Sulfa Antibiotics Swelling     Pt states \"my legs swelled up, fever, I just felt bad.\"      Social History     Socioeconomic History    Marital status:      Spouse name: Kimberly    Years of education: College   Tobacco Use    Smoking status: Never     Passive exposure: Never (mother smoked in the home)    Smokeless tobacco: Never    Tobacco comments:     No caffeine   Vaping Use    Vaping status: Never Used   Substance and Sexual Activity    Alcohol use: Yes     Alcohol/week: 4.0 standard drinks of alcohol     Types: 2 Glasses of wine, 2 Cans of beer per week     Comment: rarely    Drug use: No    Sexual activity: Not Currently     Partners: Female     Birth control/protection: Hysterectomy        Current Outpatient Medications:     aspirin 81 MG EC tablet, Take 1 tablet by mouth Daily., Disp: 90 tablet, Rfl: 3    atorvastatin (LIPITOR) 20 MG tablet, TAKE 1 TABLET BY MOUTH DAILY, Disp: 90 tablet, Rfl: 1    Cholecalciferol (VITAMIN D3) 2000 UNITS capsule, Take 1 capsule by mouth Daily., Disp: , Rfl:     Cyanocobalamin (B-12 PO), Take 1,000 mcg by mouth 1 (One) Time Per Week., Disp: , Rfl:     metoprolol tartrate (LOPRESSOR) 25 MG tablet, Take 1 tablet (25 mg) QAM and 2 tablets (50 mg) QPM, Disp: 135 tablet, Rfl: 3    Omega-3 Fatty Acids (FISH OIL) 1200 MG capsule capsule, Take 1 capsule by mouth 2 (Two) Times a Day. Hold for surgery, Disp: , Rfl:     tadalafil (Cialis) 10 MG tablet, Take 1 tablet by mouth Daily As Needed for Erectile Dysfunction., Disp: 30 tablet, Rfl: 0    cyclobenzaprine (FLEXERIL) 10 MG tablet, Take 1 tablet by mouth 3 (Three) Times a Day As Needed for Muscle Spasms., Disp: 30 tablet, Rfl: 1     Objective     Review of Systems   Musculoskeletal:  Positive for back pain.       Physical Exam  Vitals and " nursing note reviewed.   Constitutional:       Appearance: Normal appearance.   Musculoskeletal:      Comments: He had some pain on palpation of the lumbar spine on the left side.   Neurological:      Mental Status: He is alert and oriented to person, place, and time.      Motor: No weakness.      Coordination: Coordination normal.      Gait: Gait normal.      Deep Tendon Reflexes: Reflexes normal.       Physical Exam  Musculoskeletal: Tenderness on palpation of the lower back, exacerbated by pressure.    Results  Labs   - Urine sample: No blood and no infection    Imaging   - X-ray of the kidney: Particles in the kidney    Assessment & Plan    Problems Addressed this Visit    None  Visit Diagnoses         Lumbar spine pain    -  Primary    Relevant Orders    Ambulatory Referral to Physical Therapy for Evaluation & Treatment          Diagnoses         Codes Comments      Lumbar spine pain    -  Primary ICD-10-CM: M54.50  ICD-9-CM: 724.2           Assessment & Plan  1. Back pain.  - The etiology of the pain appears to be muscular in nature, potentially exacerbated by recent physical activity involving the movement of a washer and dryer.  - Physical examination revealed tenderness upon palpation of the affected area.  - Discussed the use of a heating pad and capsaicin patches for symptomatic relief. Over-the-counter ibuprofen, taken 3 to 4 times daily, may also provide relief, but should not be used for more than one week.  - A prescription for Flexeril 10 mg, consisting of 30 tablets, has been issued. A referral for physical therapy has been made.    2. Motion sickness.  He is going fishing in a month and I think he might have used for Transderm scopolamine patches.  - A prescription for Dramamine has been provided to manage potential motion sickness during his upcoming deep-sea fishing trip.  - No physical examination findings relevant to motion sickness were discussed.  - Advised to take one tablet before boarding  the boat or use a patch behind the ear as needed.  - No additional treatments or referrals were necessary for this condition.      No follow-ups on file.    Patient or patient representative verbalized consent for the use of Ambient Listening during the visit with  Moose Chase MD for chart documentation. 5/23/2025  16:44 EDT
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
## 2025-05-28 ENCOUNTER — HOSPITAL ENCOUNTER (OUTPATIENT)
Dept: PHYSICAL THERAPY | Facility: HOSPITAL | Age: 70
Setting detail: THERAPIES SERIES
Discharge: HOME | End: 2025-05-28
Payer: MEDICARE

## 2025-05-28 DIAGNOSIS — M54.50 LOW BACK PAIN, UNSPECIFIED BACK PAIN LATERALITY, UNSPECIFIED CHRONICITY, UNSPECIFIED WHETHER SCIATICA PRESENT: Primary | ICD-10-CM

## 2025-05-28 DIAGNOSIS — M54.50 LUMBAR SPINE PAIN: ICD-10-CM

## 2025-05-28 PROCEDURE — 97110 THERAPEUTIC EXERCISES: CPT

## 2025-05-28 PROCEDURE — 97140 MANUAL THERAPY 1/> REGIONS: CPT

## 2025-05-28 NOTE — THERAPY TREATMENT NOTE
"  Outpatient Physical Therapy Ortho Treatment Note  [facility]     Patient Name: Terrance Gresham  : 1955  MRN: [id number]  Today's Date: 2025      Visit Date: 2025    Visit Dx:    ICD-10-CM ICD-9-CM   1. Low back pain, unspecified back pain laterality, unspecified chronicity, unspecified whether sciatica present  M54.50 724.2   2. Lumbar spine pain  M54.50 724.2       Patient Active Problem List   Diagnosis    Hypogonadism in male    Hypercholesterolemia    Gastroesophageal reflux disease    Paresthesia of lower extremity    Thoracic back pain    Erectile dysfunction of nonorganic origin    Well adult exam    Atypical chest pain    Leukopenia    History of colon polyps    Medicare annual wellness visit, subsequent    Community acquired pneumonia    Pneumonia due to Streptococcus pneumoniae    Influenza A    Elevated d-dimer    Bacteremia due to Streptococcus pneumoniae    Annual physical exam    Renal calculi    Atherosclerosis of native coronary artery of native heart with angina pectoris    Neuropathy    Varicose veins of ankle    Spider veins    Hyperglycemia    Hypotension due to drugs        Past Medical History:   Diagnosis Date    Cataract     Cataract sugery    Chest pain 2023    came through [facility] ED..  followed up with  on 5/10/23. Coronary angiogram on  showed \"moderate to severe stenosis in the proximal LAD\" pt to start aspirin and statin.    Coronary artery disease     Disc disorder     Bulging    Enlarged prostate     History of exertional chest pain 2022    Hx of colonic polyp     Hyperlipidemia     Hypertension     Kidney stone     Leukopenia     negative liver scan    Nephrolithiasis     Pneumonia     SEPTIC    Renal calculi         Past Surgical History:   Procedure Laterality Date    CARDIAC CATHETERIZATION N/A 2023    Procedure: Left Heart Cath;  Surgeon: Ariel Landeros MD;  Location: [facility] INVASIVE " LOCATION;  Service: Cardiovascular;  Laterality: N/A;    CARDIAC CATHETERIZATION N/A 8/1/2023    Procedure: Coronary angiography;  Surgeon: Ariel Landeros MD;  Location: [facility] CATH INVASIVE LOCATION;  Service: Cardiovascular;  Laterality: N/A;    CATARACT EXTRACTION W/ INTRAOCULAR LENS IMPLANT Bilateral 2022    COLONOSCOPY N/A 12/10/2018    Procedure: COLONOSCOPY to cecuman ti;  Surgeon: Jose Montgomery MD;  Location: [facility] ENDOSCOPY;  Service: Gastroenterology    COLONOSCOPY W/ POLYPECTOMY      CYST REMOVAL Right 2012    wrist     CYSTOSCOPY      CYSTOSCOPY W/ LASER LITHOTRIPSY      EXTRACORPOREAL SHOCK WAVE LITHOTRIPSY (ESWL) Left 7/5/2023    Procedure: LEFT  SHOCKWAVE LITHOTRIPSY;  Surgeon: Abel Pinto MD;  Location: [facility] MAIN OR;  Service: Urology;  Laterality: Left;    KNEE ARTHROSCOPY Right 2015    KNEE SURGERY Right 01/13/2016                        PT Assessment/Plan       Row Name 05/28/25 1000          PT Assessment    Assessment Comments Terrance returns to clinic for first follow up from initial evaluation for acute LBP. Reports no signficant change to condition following initial HEP. Focused program on gentle thoracolumbar mobility and core stabilization. Added swiss ball roll out, PPT, and open book in standing. Despite no subjective improvement in pain did observe greater ease when performing gentle rotational movements compared to evaluation. Ended session with gentle manual to T/L spine and hip girdle on L. Noted greater ease with rolling and coming from sidelying to sit. Pt. Remains appropriate for skilled PT.  -MP        PT Plan    PT Plan Comments A-R, BKFO?  -MP               User Key  (r) = Recorded By, (t) = Taken By, (c) = Cosigned By      Initials Name Provider Type    Stacy Loya, PT Physical Therapist                       OP Exercises       Row Name 05/28/25 1000             Subjective    Subjective Comments It's about the same  -MP         Subjective Pain     Able to rate subjective pain? yes  -MP      Pre-Treatment Pain Level 6  -MP      Post-Treatment Pain Level 6  -MP         Total Minutes    48303 - PT Therapeutic Exercise Minutes 30  -MP      38415 - PT Manual Therapy Minutes 10  -MP         Exercise 1    Exercise Name 1 NuStep  -MP      Cueing 1 Verbal;Demo  -MP      Time 1 5 min  -MP         Exercise 2    Exercise Name 2 LTR  -MP      Cueing 2 Verbal;Demo  -MP      Reps 2 10s  -MP      Time 2 3s  -MP         Exercise 3    Exercise Name 3 SKTC  -MP      Cueing 3 Verbal;Demo  -MP      Reps 3 3e  -MP      Time 3 20s  -MP         Exercise 4    Exercise Name 4 swiss ball roll out  -MP      Cueing 4 Verbal;Demo  -MP      Reps 4 10ea  -MP      Time 4 5s  -MP      Additional Comments 3-way  -MP         Exercise 5    Exercise Name 5 open book - standing  -MP      Cueing 5 Verbal;Demo  -MP      Reps 5 10ea  -MP      Time 5 5s  -MP         Exercise 6    Exercise Name 6 H/L PPT  -MP      Cueing 6 Verbal;Demo  -MP      Reps 6 15  -MP      Time 6 5s  -MP                User Key  (r) = Recorded By, (t) = Taken By, (c) = Cosigned By      Initials Name Provider Type    MP Stacy Kingsley, PT Physical Therapist                             Manual Rx (Last 36 Hours)       Manual Treatments       Row Name 05/28/25 1000             Total Minutes    74871 - PT Manual Therapy Minutes 10  -MP         Manual Rx 1    Manual Rx 1 Location sidelying L; STM T/L spine adn hip girdle to R  -MP                User Key  (r) = Recorded By, (t) = Taken By, (c) = Cosigned By      Initials Name Provider Type    Stacy Loya, PT Physical Therapist                     PT OP Goals       Row Name 05/28/25 1000          PT Short Term Goals    STG Date to Achieve 06/22/25  -MP     STG 1 Pt. will be independent with initial HEP to improve self-management of condition.  -MP     STG 1 Progress Ongoing  -MP     STG 2 Pt. will demonstrate proper body mechanics with forward bending/lifting activities to  preserve lumbar spine with functional activities.  -MP     STG 2 Progress Ongoing  -MP     STG 3 Pt. Will report 50% improvement in ability to roll/turn in bed to improve sleep quality.  -MP     STG 3 Progress Ongoing  -MP     STG 4 Pt. Will report 50% improvement in ease of donning/doffing pants to improve ability to complete ADLs.  -MP     STG 4 Progress Ongoing  -MP        Long Term Goals    LTG Date to Achieve 07/22/25  -MP     LTG 1 Pt. will be independent with advanced HEP to improve long term independence with self management of condition.  -MP     LTG 1 Progress Ongoing  -MP     LTG 2 Pt. will score </= 12% on Modified Oswestry to indicate improved perception of disability.  -MP     LTG 2 Progress Ongoing  -MP     LTG 3 Pt. will demonstrate proper TrA engagement in standing and dynamic movements to improve lumbopelvic stability.  -MP     LTG 3 Progress Ongoing  -MP     LTG 4 Pt. Will demonstrate proper bending/lifting mechanics with >/= 10# to prevent strain to lumbar spine with daily chores.  -MP     LTG 4 Progress Ongoing  -MP     LTG 5 Pt. will improve B hip abd/extension strength >/= 4-/5 to improve lumbopelvic stability.  -MP     LTG 5 Progress Ongoing  -MP               User Key  (r) = Recorded By, (t) = Taken By, (c) = Cosigned By      Initials Name Provider Type    Stacy Loya PT Physical Therapist                    Therapy Education  Education Details: Updated HEP [id number]  Given: HEP, Symptoms/condition management, Posture/body mechanics  Program: Reinforced, Progressed  How Provided: Written, Verbal, Demonstration  Provided to: Patient  Level of Understanding: Verbalized, Demonstrated              Time Calculation:   Start Time: 1030  Stop Time: 1111  Time Calculation (min): 41 min  Total Timed Code Minutes- PT: 40 minute(s)  Timed Charges  45087 - PT Therapeutic Exercise Minutes: 30  50806 - PT Manual Therapy Minutes: 10  Total Minutes  Timed Charges Total Minutes: 40   Total Minutes:  40  Therapy Charges for Today       Code Description Service Date Service Provider Modifiers Qty    [id number]  PT MANUAL THERAPY EA 15 MIN 5/28/2025 Stacy Kingsley, PT GP 1    [id number]  PT THER PROC EA 15 MIN 5/28/2025 Stacy Kingsley, PT GP 2                      Stacy Kingsley, PT  5/28/2025

## 2025-06-06 ENCOUNTER — HOSPITAL ENCOUNTER (OUTPATIENT)
Dept: PHYSICAL THERAPY | Facility: HOSPITAL | Age: 70
Setting detail: THERAPIES SERIES
Discharge: HOME | End: 2025-06-06
Payer: MEDICARE

## 2025-06-06 DIAGNOSIS — M54.50 LUMBAR SPINE PAIN: ICD-10-CM

## 2025-06-06 DIAGNOSIS — M54.50 LOW BACK PAIN, UNSPECIFIED BACK PAIN LATERALITY, UNSPECIFIED CHRONICITY, UNSPECIFIED WHETHER SCIATICA PRESENT: Primary | ICD-10-CM

## 2025-06-06 PROCEDURE — 97110 THERAPEUTIC EXERCISES: CPT

## 2025-06-06 PROCEDURE — 97530 THERAPEUTIC ACTIVITIES: CPT

## 2025-06-06 NOTE — THERAPY DISCHARGE NOTE
"  Outpatient Physical Therapy Ortho Treatment Note  [facility]     Patient Name: Terrance Gresham  : 1955  MRN: [id number]  Today's Date: 2025      Visit Date: 2025    Visit Dx:    ICD-10-CM ICD-9-CM   1. Low back pain, unspecified back pain laterality, unspecified chronicity, unspecified whether sciatica present  M54.50 724.2   2. Lumbar spine pain  M54.50 724.2       Patient Active Problem List   Diagnosis    Hypogonadism in male    Hypercholesterolemia    Gastroesophageal reflux disease    Paresthesia of lower extremity    Thoracic back pain    Erectile dysfunction of nonorganic origin    Well adult exam    Atypical chest pain    Leukopenia    History of colon polyps    Medicare annual wellness visit, subsequent    Community acquired pneumonia    Pneumonia due to Streptococcus pneumoniae    Influenza A    Elevated d-dimer    Bacteremia due to Streptococcus pneumoniae    Annual physical exam    Renal calculi    Atherosclerosis of native coronary artery of native heart with angina pectoris    Neuropathy    Varicose veins of ankle    Spider veins    Hyperglycemia    Hypotension due to drugs        Past Medical History:   Diagnosis Date    Cataract     Cataract sugery    Chest pain 2023    came through [facility] ED..  followed up with  on 5/10/23. Coronary angiogram on  showed \"moderate to severe stenosis in the proximal LAD\" pt to start aspirin and statin.    Coronary artery disease     Disc disorder     Bulging    Enlarged prostate     History of exertional chest pain 2022    Hx of colonic polyp     Hyperlipidemia     Hypertension     Kidney stone     Leukopenia     negative liver scan    Nephrolithiasis     Pneumonia     SEPTIC    Renal calculi         Past Surgical History:   Procedure Laterality Date    CARDIAC CATHETERIZATION N/A 2023    Procedure: Left Heart Cath;  Surgeon: Ariel Landeros MD;  Location: [facility] INVASIVE " LOCATION;  Service: Cardiovascular;  Laterality: N/A;    CARDIAC CATHETERIZATION N/A 8/1/2023    Procedure: Coronary angiography;  Surgeon: Ariel Landeros MD;  Location: [facility] CATH INVASIVE LOCATION;  Service: Cardiovascular;  Laterality: N/A;    CATARACT EXTRACTION W/ INTRAOCULAR LENS IMPLANT Bilateral 2022    COLONOSCOPY N/A 12/10/2018    Procedure: COLONOSCOPY to cecuman ti;  Surgeon: Jose Montgomery MD;  Location: [facility] ENDOSCOPY;  Service: Gastroenterology    COLONOSCOPY W/ POLYPECTOMY      CYST REMOVAL Right 2012    wrist     CYSTOSCOPY      CYSTOSCOPY W/ LASER LITHOTRIPSY      EXTRACORPOREAL SHOCK WAVE LITHOTRIPSY (ESWL) Left 7/5/2023    Procedure: LEFT  SHOCKWAVE LITHOTRIPSY;  Surgeon: Abel Pinto MD;  Location: [facility] MAIN OR;  Service: Urology;  Laterality: Left;    KNEE ARTHROSCOPY Right 2015    KNEE SURGERY Right 01/13/2016                        PT Assessment/Plan       Row Name 06/06/25 1000          PT Assessment    Assessment Comments Terrance Gresham was seen for 2 physical therapy sessions for acute onset L sided LBP. Treatment included therapeutic exercise, therapeutic activity, manual therapy, and patient education with home exercise program . Progress to physical therapy goals was good as pt. Reports resolution of pain, feels his condition has returned to baseline. He met 4/4 STGs and 4/5 LTGs. Reviewed lifting mechanics, HEP, postural awareness, role of exercise/stretching in condition management. He was discharged to an independent HEP and provided patient education to self-manage condition  -MP        PT Plan    PT Plan Comments DC  -MP               User Key  (r) = Recorded By, (t) = Taken By, (c) = Cosigned By      Initials Name Provider Type    Stacy Loya, PT Physical Therapist                       OP Exercises       Row Name 06/06/25 0900             Subjective    Subjective Comments It feels way better  -MP         Subjective Pain    Able to rate subjective  pain? yes  -MP      Pre-Treatment Pain Level 0  -MP      Post-Treatment Pain Level 0  -MP         Total Minutes    35977 - PT Therapeutic Exercise Minutes 28  -MP      23185 - PT Therapeutic Activity Minutes 10  -MP         Exercise 1    Exercise Name 1 NuStep  -MP      Cueing 1 Verbal;Demo  -MP      Time 1 5 min  -MP         Exercise 5    Exercise Name 5 open book - standing  -MP      Cueing 5 Verbal;Demo  -MP      Reps 5 10ea  -MP         Exercise 7    Exercise Name 7 lifting mechanics  -MP      Cueing 7 Verbal;Demo  -MP      Reps 7 3-5  -MP      Time 7 box from floor to chair, golfers lift cone, split stance squat  -MP         Exercise 8    Exercise Name 8 outcome measure  -MP         Exercise 9    Exercise Name 9 education on HEP  -MP                User Key  (r) = Recorded By, (t) = Taken By, (c) = Cosigned By      Initials Name Provider Type    Stacy Loya, PT Physical Therapist                                  PT OP Goals       Row Name 06/06/25 0900          PT Short Term Goals    STG Date to Achieve 06/22/25  -MP     STG 1 Pt. will be independent with initial HEP to improve self-management of condition.  -MP     STG 1 Progress Met  -MP     STG 1 Progress Comments reports compliance  -MP     STG 2 Pt. will demonstrate proper body mechanics with forward bending/lifting activities to preserve lumbar spine with functional activities.  -MP     STG 2 Progress Met  -MP     STG 2 Progress Comments reviewed this date  -MP     STG 3 Pt. Will report 50% improvement in ability to roll/turn in bed to improve sleep quality.  -MP     STG 3 Progress Met  -MP     STG 3 Progress Comments reports return to baseline  -MP     STG 4 Pt. Will report 50% improvement in ease of donning/doffing pants to improve ability to complete ADLs.  -MP     STG 4 Progress Met  -MP     STG 4 Progress Comments reports return to baseline  -MP        Long Term Goals    LTG Date to Achieve 07/22/25  -MP     LTG 1 Pt. will be independent with  advanced HEP to improve long term independence with self management of condition.  -MP     LTG 1 Progress Met  -MP     LTG 1 Progress Comments limited visits but tolerated progressions  -MP     LTG 2 Pt. will score </= 12% on Modified Oswestry to indicate improved perception of disability.  -MP     LTG 2 Progress Met  -MP     LTG 2 Progress Comments 0  -MP     LTG 3 Pt. will demonstrate proper TrA engagement in standing and dynamic movements to improve lumbopelvic stability.  -MP     LTG 3 Progress Met  -MP     LTG 3 Progress Comments able to correct upright seated and standing posture  -MP     LTG 4 Pt. Will demonstrate proper bending/lifting mechanics with >/= 10# to prevent strain to lumbar spine with daily chores.  -MP     LTG 4 Progress Met  -MP     LTG 4 Progress Comments reviewed this date  -MP     LTG 5 Pt. will improve B hip abd/extension strength >/= 4-/5 to improve lumbopelvic stability.  -MP     LTG 5 Progress Not Met  -MP     LTG 5 Progress Comments limtied visits; not retested  -MP               User Key  (r) = Recorded By, (t) = Taken By, (c) = Cosigned By      Initials Name Provider Type    Stacy Loya, PT Physical Therapist                    Therapy Education  Education Details: lifting mechanics; reviewed HEP and instructed pt. to continue performing, how to incorporate swiss ball, role of posture  Given: Posture/body mechanics  Program: Reinforced  How Provided: Verbal, Demonstration  Provided to: Patient  Level of Understanding: Verbalized, Demonstrated    Outcome Measure Options: Modified Oswestry  Modified Oswestry  Modified Oswestry Score/Comments: 0/50 0%      Time Calculation:   Start Time: 0945  Stop Time: 1023  Time Calculation (min): 38 min  Total Timed Code Minutes- PT: 38 minute(s)  Timed Charges  95784 - PT Therapeutic Exercise Minutes: 28  57640 - PT Therapeutic Activity Minutes: 10  Total Minutes  Timed Charges Total Minutes: 38   Total Minutes: 38  Therapy Charges for Today        Code Description Service Date Service Provider Modifiers Qty    [id number]  PT THERAPEUTIC ACT EA 15 MIN 6/6/2025 Stacy Kingsley, PT GP 1    [id number]  PT THER PROC EA 15 MIN 6/6/2025 Stacy Kingsley, PT GP 2            PT G-Codes  Outcome Measure Options: Modified Oswestry  Modified Oswestry Score/Comments: 0/50 0%         Stacy Kingsley, PT  6/6/2025

## 2025-06-12 ENCOUNTER — APPOINTMENT (OUTPATIENT)
Dept: PHYSICAL THERAPY | Facility: HOSPITAL | Age: 70
End: 2025-06-12
Payer: MEDICARE

## 2025-06-16 ENCOUNTER — APPOINTMENT (OUTPATIENT)
Dept: PHYSICAL THERAPY | Facility: HOSPITAL | Age: 70
End: 2025-06-16
Payer: MEDICARE

## 2025-06-30 RX ORDER — ATORVASTATIN CALCIUM 20 MG/1
20 TABLET, FILM COATED ORAL DAILY
Qty: 90 TABLET | Refills: 1 | Status: SHIPPED | OUTPATIENT
Start: 2025-06-30

## 2025-07-12 RX ORDER — METOPROLOL TARTRATE 25 MG/1
25 TABLET, FILM COATED ORAL 2 TIMES DAILY
Qty: 60 TABLET | OUTPATIENT
Start: 2025-07-12

## 2025-08-19 ENCOUNTER — OFFICE VISIT (OUTPATIENT)
Dept: FAMILY MEDICINE CLINIC | Facility: CLINIC | Age: 70
End: 2025-08-19
Payer: MEDICARE

## 2025-08-19 VITALS
SYSTOLIC BLOOD PRESSURE: 96 MMHG | WEIGHT: 175.2 LBS | DIASTOLIC BLOOD PRESSURE: 64 MMHG | HEART RATE: 59 BPM | BODY MASS INDEX: 25.08 KG/M2 | HEIGHT: 70 IN | OXYGEN SATURATION: 100 % | RESPIRATION RATE: 16 BRPM

## 2025-08-19 DIAGNOSIS — E78.00 HYPERCHOLESTEROLEMIA: Primary | ICD-10-CM

## 2025-08-19 PROCEDURE — G2211 COMPLEX E/M VISIT ADD ON: HCPCS | Performed by: INTERNAL MEDICINE

## 2025-08-19 PROCEDURE — 99213 OFFICE O/P EST LOW 20 MIN: CPT | Performed by: INTERNAL MEDICINE

## 2025-08-19 PROCEDURE — 1125F AMNT PAIN NOTED PAIN PRSNT: CPT | Performed by: INTERNAL MEDICINE

## (undated) DEVICE — Device: Brand: DEFENDO AIR/WATER/SUCTION AND BIOPSY VALVE

## (undated) DEVICE — RADIFOCUS OPTITORQUE ANGIOGRAPHIC CATHETER: Brand: OPTITORQUE

## (undated) DEVICE — THE TORRENT IRRIGATION SCOPE CONNECTOR IS USED WITH THE TORRENT IRRIGATION TUBING TO PROVIDE IRRIGATION FLUIDS SUCH AS STERILE WATER DURING GASTROINTESTINAL ENDOSCOPIC PROCEDURES WHEN USED IN CONJUNCTION WITH AN IRRIGATION PUMP (OR ELECTROSURGICAL UNIT).: Brand: TORRENT

## (undated) DEVICE — GW EMR FIX EXCHG J STD .035 3MM 260CM

## (undated) DEVICE — TUBING, SUCTION, 1/4" X 10', STRAIGHT: Brand: MEDLINE

## (undated) DEVICE — PK CATH CARD 40

## (undated) DEVICE — CANN NASL CO2 TRULINK W/O2 A/

## (undated) DEVICE — KT MANIFLD CARDIAC

## (undated) DEVICE — GLIDESHEATH SLENDER STAINLESS STEEL KIT: Brand: GLIDESHEATH SLENDER

## (undated) DEVICE — THE VASC BAND HEMOSTAT IS A COMPRESSION DEVICE TO ASSIST HEMOSTASIS OF ARTERIAL, VENOUS AND HEMODIALYSIS PERCUTANEOUS ACCESS SITES.: Brand: VASC BAND™ HEMOSTAT